# Patient Record
Sex: FEMALE | Race: WHITE | Employment: OTHER | URBAN - METROPOLITAN AREA
[De-identification: names, ages, dates, MRNs, and addresses within clinical notes are randomized per-mention and may not be internally consistent; named-entity substitution may affect disease eponyms.]

---

## 2017-02-07 ENCOUNTER — OFFICE VISIT (OUTPATIENT)
Dept: ORTHOPEDIC SURGERY | Age: 67
End: 2017-02-07

## 2017-02-07 VITALS — BODY MASS INDEX: 29.98 KG/M2 | HEIGHT: 62 IN | WEIGHT: 162.92 LBS

## 2017-02-07 DIAGNOSIS — M79.671 FOOT PAIN, RIGHT: Primary | ICD-10-CM

## 2017-02-07 DIAGNOSIS — M76.62 ACHILLES TENDINITIS, LEFT LEG: ICD-10-CM

## 2017-02-07 DIAGNOSIS — M79.672 PAIN OF LEFT HEEL: ICD-10-CM

## 2017-02-07 DIAGNOSIS — M20.11 HALLUX VALGUS, RIGHT: ICD-10-CM

## 2017-02-07 PROBLEM — M20.10 HALLUX VALGUS: Status: ACTIVE | Noted: 2017-02-07

## 2017-02-07 PROCEDURE — 3014F SCREEN MAMMO DOC REV: CPT | Performed by: PODIATRIST

## 2017-02-07 PROCEDURE — 3017F COLORECTAL CA SCREEN DOC REV: CPT | Performed by: PODIATRIST

## 2017-02-07 PROCEDURE — G8400 PT W/DXA NO RESULTS DOC: HCPCS | Performed by: PODIATRIST

## 2017-02-07 PROCEDURE — 99213 OFFICE O/P EST LOW 20 MIN: CPT | Performed by: PODIATRIST

## 2017-02-07 PROCEDURE — 1036F TOBACCO NON-USER: CPT | Performed by: PODIATRIST

## 2017-02-07 PROCEDURE — G8420 CALC BMI NORM PARAMETERS: HCPCS | Performed by: PODIATRIST

## 2017-02-07 PROCEDURE — G8484 FLU IMMUNIZE NO ADMIN: HCPCS | Performed by: PODIATRIST

## 2017-02-07 PROCEDURE — G8427 DOCREV CUR MEDS BY ELIG CLIN: HCPCS | Performed by: PODIATRIST

## 2017-02-07 PROCEDURE — 1090F PRES/ABSN URINE INCON ASSESS: CPT | Performed by: PODIATRIST

## 2017-02-07 PROCEDURE — 73650 X-RAY EXAM OF HEEL: CPT | Performed by: PODIATRIST

## 2017-02-07 PROCEDURE — 4040F PNEUMOC VAC/ADMIN/RCVD: CPT | Performed by: PODIATRIST

## 2017-02-07 PROCEDURE — 1123F ACP DISCUSS/DSCN MKR DOCD: CPT | Performed by: PODIATRIST

## 2017-02-07 PROCEDURE — 73630 X-RAY EXAM OF FOOT: CPT | Performed by: PODIATRIST

## 2017-02-07 RX ORDER — PREDNISONE 10 MG/1
TABLET ORAL
Qty: 26 TABLET | Refills: 0 | Status: SHIPPED | OUTPATIENT
Start: 2017-02-07 | End: 2017-08-23 | Stop reason: ALTCHOICE

## 2017-02-28 ENCOUNTER — OFFICE VISIT (OUTPATIENT)
Dept: ORTHOPEDIC SURGERY | Age: 67
End: 2017-02-28

## 2017-02-28 VITALS
SYSTOLIC BLOOD PRESSURE: 130 MMHG | HEART RATE: 74 BPM | HEIGHT: 62 IN | WEIGHT: 162.92 LBS | DIASTOLIC BLOOD PRESSURE: 80 MMHG | BODY MASS INDEX: 29.98 KG/M2

## 2017-02-28 DIAGNOSIS — M76.62 ACHILLES TENDINITIS OF LEFT LOWER EXTREMITY: Primary | ICD-10-CM

## 2017-02-28 PROCEDURE — 3017F COLORECTAL CA SCREEN DOC REV: CPT | Performed by: PODIATRIST

## 2017-02-28 PROCEDURE — 3014F SCREEN MAMMO DOC REV: CPT | Performed by: PODIATRIST

## 2017-02-28 PROCEDURE — 1036F TOBACCO NON-USER: CPT | Performed by: PODIATRIST

## 2017-02-28 PROCEDURE — 99213 OFFICE O/P EST LOW 20 MIN: CPT | Performed by: PODIATRIST

## 2017-02-28 PROCEDURE — G8420 CALC BMI NORM PARAMETERS: HCPCS | Performed by: PODIATRIST

## 2017-02-28 PROCEDURE — 1123F ACP DISCUSS/DSCN MKR DOCD: CPT | Performed by: PODIATRIST

## 2017-02-28 PROCEDURE — G8484 FLU IMMUNIZE NO ADMIN: HCPCS | Performed by: PODIATRIST

## 2017-02-28 PROCEDURE — G8400 PT W/DXA NO RESULTS DOC: HCPCS | Performed by: PODIATRIST

## 2017-02-28 PROCEDURE — 1090F PRES/ABSN URINE INCON ASSESS: CPT | Performed by: PODIATRIST

## 2017-02-28 PROCEDURE — 4040F PNEUMOC VAC/ADMIN/RCVD: CPT | Performed by: PODIATRIST

## 2017-02-28 PROCEDURE — G8427 DOCREV CUR MEDS BY ELIG CLIN: HCPCS | Performed by: PODIATRIST

## 2017-02-28 RX ORDER — DEXAMETHASONE SODIUM PHOSPHATE 4 MG/ML
INJECTION, SOLUTION INTRA-ARTICULAR; INTRALESIONAL; INTRAMUSCULAR; INTRAVENOUS; SOFT TISSUE
Qty: 30 ML | Refills: 0 | Status: SHIPPED | OUTPATIENT
Start: 2017-02-28 | End: 2018-01-15

## 2017-03-07 ENCOUNTER — HOSPITAL ENCOUNTER (OUTPATIENT)
Dept: PHYSICAL THERAPY | Age: 67
Discharge: HOME | End: 2017-02-28
Admitting: PODIATRIST

## 2017-03-07 ENCOUNTER — HOSPITAL ENCOUNTER (OUTPATIENT)
Dept: PHYSICAL THERAPY | Age: 67
Discharge: HOME | End: 2017-03-07
Admitting: PODIATRIST

## 2017-03-09 ENCOUNTER — HOSPITAL ENCOUNTER (OUTPATIENT)
Dept: PHYSICAL THERAPY | Age: 67
Discharge: HOME | End: 2017-03-09
Admitting: PODIATRIST

## 2017-03-14 ENCOUNTER — HOSPITAL ENCOUNTER (OUTPATIENT)
Dept: PHYSICAL THERAPY | Age: 67
Discharge: HOME | End: 2017-03-14
Admitting: PODIATRIST

## 2017-03-16 ENCOUNTER — HOSPITAL ENCOUNTER (OUTPATIENT)
Dept: PHYSICAL THERAPY | Age: 67
Discharge: HOME | End: 2017-03-16
Admitting: PODIATRIST

## 2017-03-21 ENCOUNTER — HOSPITAL ENCOUNTER (OUTPATIENT)
Dept: PHYSICAL THERAPY | Age: 67
Discharge: HOME | End: 2017-03-21
Admitting: PODIATRIST

## 2017-03-23 ENCOUNTER — HOSPITAL ENCOUNTER (OUTPATIENT)
Dept: PHYSICAL THERAPY | Age: 67
Discharge: HOME | End: 2017-03-23
Admitting: PODIATRIST

## 2017-03-28 ENCOUNTER — HOSPITAL ENCOUNTER (OUTPATIENT)
Dept: PHYSICAL THERAPY | Age: 67
Discharge: HOME | End: 2017-03-28
Admitting: PODIATRIST

## 2017-03-30 ENCOUNTER — HOSPITAL ENCOUNTER (OUTPATIENT)
Dept: PHYSICAL THERAPY | Age: 67
Discharge: HOME | End: 2017-03-30
Admitting: PODIATRIST

## 2017-04-04 ENCOUNTER — HOSPITAL ENCOUNTER (OUTPATIENT)
Dept: PHYSICAL THERAPY | Age: 67
Discharge: HOME | End: 2017-04-04
Admitting: PODIATRIST

## 2017-04-06 ENCOUNTER — HOSPITAL ENCOUNTER (OUTPATIENT)
Dept: PHYSICAL THERAPY | Age: 67
Discharge: HOME | End: 2017-04-06
Admitting: PODIATRIST

## 2017-04-18 ENCOUNTER — HOSPITAL ENCOUNTER (OUTPATIENT)
Dept: PHYSICAL THERAPY | Age: 67
Discharge: HOME | End: 2017-04-18
Admitting: PODIATRIST

## 2017-04-25 ENCOUNTER — HOSPITAL ENCOUNTER (OUTPATIENT)
Dept: PHYSICAL THERAPY | Age: 67
Discharge: HOME | End: 2017-04-25
Admitting: PODIATRIST

## 2017-05-01 ENCOUNTER — OFFICE VISIT (OUTPATIENT)
Dept: ORTHOPEDIC SURGERY | Age: 67
End: 2017-05-01

## 2017-05-01 VITALS — HEIGHT: 62 IN | BODY MASS INDEX: 29.98 KG/M2 | WEIGHT: 162.92 LBS

## 2017-05-01 DIAGNOSIS — M65.9 SYNOVITIS: ICD-10-CM

## 2017-05-01 DIAGNOSIS — M19.049 HAND ARTHRITIS: ICD-10-CM

## 2017-05-01 DIAGNOSIS — M79.642 HAND PAIN, LEFT: Primary | ICD-10-CM

## 2017-05-01 PROCEDURE — G8420 CALC BMI NORM PARAMETERS: HCPCS | Performed by: ORTHOPAEDIC SURGERY

## 2017-05-01 PROCEDURE — 3014F SCREEN MAMMO DOC REV: CPT | Performed by: ORTHOPAEDIC SURGERY

## 2017-05-01 PROCEDURE — 1123F ACP DISCUSS/DSCN MKR DOCD: CPT | Performed by: ORTHOPAEDIC SURGERY

## 2017-05-01 PROCEDURE — 1036F TOBACCO NON-USER: CPT | Performed by: ORTHOPAEDIC SURGERY

## 2017-05-01 PROCEDURE — 73130 X-RAY EXAM OF HAND: CPT | Performed by: ORTHOPAEDIC SURGERY

## 2017-05-01 PROCEDURE — 3017F COLORECTAL CA SCREEN DOC REV: CPT | Performed by: ORTHOPAEDIC SURGERY

## 2017-05-01 PROCEDURE — G8400 PT W/DXA NO RESULTS DOC: HCPCS | Performed by: ORTHOPAEDIC SURGERY

## 2017-05-01 PROCEDURE — 99214 OFFICE O/P EST MOD 30 MIN: CPT | Performed by: ORTHOPAEDIC SURGERY

## 2017-05-01 PROCEDURE — 1090F PRES/ABSN URINE INCON ASSESS: CPT | Performed by: ORTHOPAEDIC SURGERY

## 2017-05-01 PROCEDURE — G8427 DOCREV CUR MEDS BY ELIG CLIN: HCPCS | Performed by: ORTHOPAEDIC SURGERY

## 2017-05-01 PROCEDURE — 4040F PNEUMOC VAC/ADMIN/RCVD: CPT | Performed by: ORTHOPAEDIC SURGERY

## 2017-05-01 RX ORDER — DICLOFENAC SODIUM 75 MG/1
TABLET, DELAYED RELEASE ORAL
COMMUNITY
Start: 2017-04-17 | End: 2017-10-31 | Stop reason: ALTCHOICE

## 2017-05-01 RX ORDER — PREDNISONE 10 MG/1
TABLET ORAL
Qty: 26 TABLET | Refills: 0 | Status: SHIPPED | OUTPATIENT
Start: 2017-05-01 | End: 2017-08-23 | Stop reason: ALTCHOICE

## 2017-07-26 ENCOUNTER — OFFICE VISIT (OUTPATIENT)
Dept: ORTHOPEDIC SURGERY | Age: 67
End: 2017-07-26

## 2017-07-26 ENCOUNTER — HOSPITAL ENCOUNTER (OUTPATIENT)
Dept: OCCUPATIONAL THERAPY | Age: 67
Discharge: HOME | End: 2017-07-31
Admitting: ORTHOPAEDIC SURGERY

## 2017-07-26 VITALS — HEIGHT: 62 IN | WEIGHT: 162 LBS | BODY MASS INDEX: 29.81 KG/M2

## 2017-07-26 DIAGNOSIS — M79.641 HAND PAIN, RIGHT: Primary | ICD-10-CM

## 2017-07-26 PROCEDURE — G8428 CUR MEDS NOT DOCUMENT: HCPCS | Performed by: ORTHOPAEDIC SURGERY

## 2017-07-26 PROCEDURE — 3014F SCREEN MAMMO DOC REV: CPT | Performed by: ORTHOPAEDIC SURGERY

## 2017-07-26 PROCEDURE — G8400 PT W/DXA NO RESULTS DOC: HCPCS | Performed by: ORTHOPAEDIC SURGERY

## 2017-07-26 PROCEDURE — 99214 OFFICE O/P EST MOD 30 MIN: CPT | Performed by: ORTHOPAEDIC SURGERY

## 2017-07-26 PROCEDURE — 73130 X-RAY EXAM OF HAND: CPT | Performed by: ORTHOPAEDIC SURGERY

## 2017-07-26 PROCEDURE — G8419 CALC BMI OUT NRM PARAM NOF/U: HCPCS | Performed by: ORTHOPAEDIC SURGERY

## 2017-07-26 PROCEDURE — 1123F ACP DISCUSS/DSCN MKR DOCD: CPT | Performed by: ORTHOPAEDIC SURGERY

## 2017-07-26 PROCEDURE — 1090F PRES/ABSN URINE INCON ASSESS: CPT | Performed by: ORTHOPAEDIC SURGERY

## 2017-07-26 PROCEDURE — 1036F TOBACCO NON-USER: CPT | Performed by: ORTHOPAEDIC SURGERY

## 2017-07-26 PROCEDURE — 4040F PNEUMOC VAC/ADMIN/RCVD: CPT | Performed by: ORTHOPAEDIC SURGERY

## 2017-07-26 PROCEDURE — 3017F COLORECTAL CA SCREEN DOC REV: CPT | Performed by: ORTHOPAEDIC SURGERY

## 2017-08-23 ENCOUNTER — OFFICE VISIT (OUTPATIENT)
Dept: ORTHOPEDIC SURGERY | Age: 67
End: 2017-08-23

## 2017-08-23 VITALS — BODY MASS INDEX: 30.36 KG/M2 | HEIGHT: 62 IN | WEIGHT: 165 LBS

## 2017-08-23 DIAGNOSIS — S63.636D SPRAIN OF INTERPHALANGEAL JOINT OF RIGHT LITTLE FINGER, SUBSEQUENT ENCOUNTER: Primary | ICD-10-CM

## 2017-08-23 PROCEDURE — 3014F SCREEN MAMMO DOC REV: CPT | Performed by: ORTHOPAEDIC SURGERY

## 2017-08-23 PROCEDURE — 99213 OFFICE O/P EST LOW 20 MIN: CPT | Performed by: ORTHOPAEDIC SURGERY

## 2017-08-23 PROCEDURE — 4040F PNEUMOC VAC/ADMIN/RCVD: CPT | Performed by: ORTHOPAEDIC SURGERY

## 2017-08-23 PROCEDURE — G8417 CALC BMI ABV UP PARAM F/U: HCPCS | Performed by: ORTHOPAEDIC SURGERY

## 2017-08-23 PROCEDURE — 1090F PRES/ABSN URINE INCON ASSESS: CPT | Performed by: ORTHOPAEDIC SURGERY

## 2017-08-23 PROCEDURE — G8427 DOCREV CUR MEDS BY ELIG CLIN: HCPCS | Performed by: ORTHOPAEDIC SURGERY

## 2017-08-23 PROCEDURE — 1123F ACP DISCUSS/DSCN MKR DOCD: CPT | Performed by: ORTHOPAEDIC SURGERY

## 2017-08-23 PROCEDURE — 1036F TOBACCO NON-USER: CPT | Performed by: ORTHOPAEDIC SURGERY

## 2017-08-23 PROCEDURE — G8400 PT W/DXA NO RESULTS DOC: HCPCS | Performed by: ORTHOPAEDIC SURGERY

## 2017-08-23 PROCEDURE — 3017F COLORECTAL CA SCREEN DOC REV: CPT | Performed by: ORTHOPAEDIC SURGERY

## 2017-10-31 ENCOUNTER — OFFICE VISIT (OUTPATIENT)
Dept: ORTHOPEDIC SURGERY | Age: 67
End: 2017-10-31

## 2017-10-31 VITALS
DIASTOLIC BLOOD PRESSURE: 75 MMHG | WEIGHT: 164.9 LBS | SYSTOLIC BLOOD PRESSURE: 131 MMHG | HEIGHT: 62 IN | HEART RATE: 78 BPM | BODY MASS INDEX: 30.35 KG/M2

## 2017-10-31 DIAGNOSIS — M76.61 RIGHT ACHILLES TENDINITIS: ICD-10-CM

## 2017-10-31 DIAGNOSIS — M79.671 PAIN OF RIGHT HEEL: Primary | ICD-10-CM

## 2017-10-31 PROCEDURE — G8427 DOCREV CUR MEDS BY ELIG CLIN: HCPCS | Performed by: PODIATRIST

## 2017-10-31 PROCEDURE — 3014F SCREEN MAMMO DOC REV: CPT | Performed by: PODIATRIST

## 2017-10-31 PROCEDURE — 1090F PRES/ABSN URINE INCON ASSESS: CPT | Performed by: PODIATRIST

## 2017-10-31 PROCEDURE — 3017F COLORECTAL CA SCREEN DOC REV: CPT | Performed by: PODIATRIST

## 2017-10-31 PROCEDURE — L4361 PNEUMA/VAC WALK BOOT PRE OTS: HCPCS | Performed by: PODIATRIST

## 2017-10-31 PROCEDURE — 1123F ACP DISCUSS/DSCN MKR DOCD: CPT | Performed by: PODIATRIST

## 2017-10-31 PROCEDURE — 4040F PNEUMOC VAC/ADMIN/RCVD: CPT | Performed by: PODIATRIST

## 2017-10-31 PROCEDURE — 1036F TOBACCO NON-USER: CPT | Performed by: PODIATRIST

## 2017-10-31 PROCEDURE — G8417 CALC BMI ABV UP PARAM F/U: HCPCS | Performed by: PODIATRIST

## 2017-10-31 PROCEDURE — G8484 FLU IMMUNIZE NO ADMIN: HCPCS | Performed by: PODIATRIST

## 2017-10-31 PROCEDURE — 99213 OFFICE O/P EST LOW 20 MIN: CPT | Performed by: PODIATRIST

## 2017-10-31 PROCEDURE — G8400 PT W/DXA NO RESULTS DOC: HCPCS | Performed by: PODIATRIST

## 2017-10-31 RX ORDER — LOSARTAN POTASSIUM 100 MG/1
100 TABLET ORAL DAILY
COMMUNITY
Start: 2017-09-25

## 2017-10-31 RX ORDER — PREDNISONE 10 MG/1
TABLET ORAL
Qty: 26 TABLET | Refills: 0 | Status: SHIPPED | OUTPATIENT
Start: 2017-10-31 | End: 2018-01-15

## 2017-10-31 RX ORDER — DOCUSATE SODIUM 100 MG/1
100 CAPSULE, LIQUID FILLED ORAL
COMMUNITY
End: 2018-01-15

## 2017-10-31 RX ORDER — DICLOFENAC SODIUM 75 MG/1
TABLET, DELAYED RELEASE ORAL
COMMUNITY
Start: 2017-10-09 | End: 2017-10-31 | Stop reason: ALTCHOICE

## 2017-10-31 RX ORDER — ATORVASTATIN CALCIUM 10 MG/1
10 TABLET, FILM COATED ORAL DAILY
COMMUNITY
Start: 2017-06-06

## 2017-10-31 RX ORDER — HYDRALAZINE HYDROCHLORIDE 10 MG/1
10 TABLET, FILM COATED ORAL DAILY
COMMUNITY
Start: 2017-10-27

## 2017-10-31 RX ORDER — METOPROLOL SUCCINATE 50 MG/1
50 TABLET, EXTENDED RELEASE ORAL NIGHTLY
COMMUNITY
Start: 2017-08-21

## 2017-10-31 RX ORDER — TRIAMCINOLONE ACETONIDE 1 MG/G
CREAM TOPICAL
COMMUNITY
End: 2018-01-15

## 2017-10-31 RX ORDER — MULTIVIT WITH MINERALS/LUTEIN
1000 TABLET ORAL
COMMUNITY
End: 2018-01-15

## 2017-10-31 RX ORDER — HYDRALAZINE HYDROCHLORIDE 10 MG/1
TABLET, FILM COATED ORAL
COMMUNITY
Start: 2017-10-16 | End: 2018-01-15

## 2017-10-31 NOTE — PROGRESS NOTES
should be decreased. The importance of rest in this condition was emphasized. I prescribed a prednisone 10 mg taper. The chronic and recurrent nature of the painful episodes with this condition was discussed. Both short-term and long-term treatments were discussed. At this point, I only recommend a trial of conservative treatment and the patient agrees with the treatment plan. I will see them back in approximately 3 weeks. Procedures    Airselect Tall Pneumatic Walking Boot     Patient was prescribed a Kirke Stammer Tall Walking Boot. The right foot will require stabilization / immobilization from this semi-rigid / rigid orthosis to improve their function. The orthosis will assist in protecting the affected area, provide functional support and facilitate healing. The patient was educated and fit by a healthcare professional with expert knowledge and specialization in brace application while under the direct supervision of the physician. Verbal and written instructions for the use of and application of this item were provided. They were instructed to contact the office immediately should the brace result in increased pain, decreased sensation, increased swelling or worsening of the condition.

## 2017-11-21 ENCOUNTER — OFFICE VISIT (OUTPATIENT)
Dept: ORTHOPEDIC SURGERY | Age: 67
End: 2017-11-21

## 2017-11-21 VITALS
SYSTOLIC BLOOD PRESSURE: 131 MMHG | HEART RATE: 74 BPM | WEIGHT: 164.9 LBS | DIASTOLIC BLOOD PRESSURE: 66 MMHG | BODY MASS INDEX: 30.35 KG/M2 | HEIGHT: 62 IN

## 2017-11-21 DIAGNOSIS — M76.61 ACHILLES TENDINITIS, RIGHT LEG: Primary | ICD-10-CM

## 2017-11-21 PROCEDURE — 99213 OFFICE O/P EST LOW 20 MIN: CPT | Performed by: PODIATRIST

## 2017-11-21 PROCEDURE — 3017F COLORECTAL CA SCREEN DOC REV: CPT | Performed by: PODIATRIST

## 2017-11-21 PROCEDURE — G8417 CALC BMI ABV UP PARAM F/U: HCPCS | Performed by: PODIATRIST

## 2017-11-21 PROCEDURE — 1090F PRES/ABSN URINE INCON ASSESS: CPT | Performed by: PODIATRIST

## 2017-11-21 PROCEDURE — G8427 DOCREV CUR MEDS BY ELIG CLIN: HCPCS | Performed by: PODIATRIST

## 2017-11-21 PROCEDURE — G8400 PT W/DXA NO RESULTS DOC: HCPCS | Performed by: PODIATRIST

## 2017-11-21 PROCEDURE — 4040F PNEUMOC VAC/ADMIN/RCVD: CPT | Performed by: PODIATRIST

## 2017-11-21 PROCEDURE — 1123F ACP DISCUSS/DSCN MKR DOCD: CPT | Performed by: PODIATRIST

## 2017-11-21 PROCEDURE — 3014F SCREEN MAMMO DOC REV: CPT | Performed by: PODIATRIST

## 2017-11-21 PROCEDURE — 1036F TOBACCO NON-USER: CPT | Performed by: PODIATRIST

## 2017-11-21 PROCEDURE — G8484 FLU IMMUNIZE NO ADMIN: HCPCS | Performed by: PODIATRIST

## 2017-11-21 RX ORDER — OMEPRAZOLE 20 MG/1
20 CAPSULE, DELAYED RELEASE ORAL
COMMUNITY
Start: 2017-11-03 | End: 2018-01-15

## 2017-11-21 RX ORDER — DICLOFENAC SODIUM 75 MG/1
TABLET, DELAYED RELEASE ORAL
COMMUNITY
Start: 2017-11-07 | End: 2018-01-15

## 2017-12-06 ENCOUNTER — OFFICE VISIT (OUTPATIENT)
Dept: ORTHOPEDIC SURGERY | Age: 67
End: 2017-12-06

## 2017-12-06 VITALS
DIASTOLIC BLOOD PRESSURE: 60 MMHG | BODY MASS INDEX: 30.35 KG/M2 | WEIGHT: 164.9 LBS | HEIGHT: 62 IN | SYSTOLIC BLOOD PRESSURE: 131 MMHG | HEART RATE: 68 BPM

## 2017-12-06 DIAGNOSIS — M76.62 ACHILLES TENDINITIS, LEFT LEG: Primary | ICD-10-CM

## 2017-12-06 PROCEDURE — G8484 FLU IMMUNIZE NO ADMIN: HCPCS | Performed by: PODIATRIST

## 2017-12-06 PROCEDURE — 1123F ACP DISCUSS/DSCN MKR DOCD: CPT | Performed by: PODIATRIST

## 2017-12-06 PROCEDURE — 4040F PNEUMOC VAC/ADMIN/RCVD: CPT | Performed by: PODIATRIST

## 2017-12-06 PROCEDURE — 1036F TOBACCO NON-USER: CPT | Performed by: PODIATRIST

## 2017-12-06 PROCEDURE — G8417 CALC BMI ABV UP PARAM F/U: HCPCS | Performed by: PODIATRIST

## 2017-12-06 PROCEDURE — G8427 DOCREV CUR MEDS BY ELIG CLIN: HCPCS | Performed by: PODIATRIST

## 2017-12-06 PROCEDURE — 99213 OFFICE O/P EST LOW 20 MIN: CPT | Performed by: PODIATRIST

## 2017-12-06 PROCEDURE — 1090F PRES/ABSN URINE INCON ASSESS: CPT | Performed by: PODIATRIST

## 2017-12-06 PROCEDURE — 3014F SCREEN MAMMO DOC REV: CPT | Performed by: PODIATRIST

## 2017-12-06 PROCEDURE — 3017F COLORECTAL CA SCREEN DOC REV: CPT | Performed by: PODIATRIST

## 2017-12-06 PROCEDURE — G8400 PT W/DXA NO RESULTS DOC: HCPCS | Performed by: PODIATRIST

## 2017-12-06 RX ORDER — CITALOPRAM 20 MG/1
20 TABLET ORAL DAILY
COMMUNITY
Start: 2017-11-21

## 2017-12-06 NOTE — PROGRESS NOTES
HISTORY OF PRESENT ILLNESS:  This is a return visit for a patient with a chief complaint of pain to the back of the right heel. The pain is no better. PHYSICAL EXAM:  The majority of the palpable tenderness is at the posterior medial  aspect of the right posterior heel at the insertion of the Achilles tendon. There is a small prominence at the posterior aspect of the right heel with some mild erythema. There is minimal pain of the Achilles tendon itself. There is no palpable deficit and Hirsch's test is negative for a complete rupture. There is full-strength with plantar flexion at the ankle. This is symmetrical.    Pedal pulses are palpable, bilateral.  The sensation is grossly intact, bilateral.    Review of the right ankle MRI demonstrates a small oblique tear of the Achilles tendon at the insertion point at the medial aspect. ASSESSMENT:  Insertional Achilles Tendinitis/Retrocalcaneal Bursitis, right      PLAN: I reviewed the MRI with patient. I recommended repair the Achilles tendon with retrocalcaneal exostectomy. Different surgical options were presented. The potential complications of any surgery were discussed. These included but were not limited to infection, pain, swelling, bleeding, numbness, recurrence of the problem, and need for further surgery to address complications. The patient verbally acknowledged consideration of these potential complications. I feel that have answered all of the preop questions regarding this problem. Certainly if they have any further questions, they can call the office. In the meantime advised her to get back into the boot to control pain.

## 2018-01-09 ENCOUNTER — TELEPHONE (OUTPATIENT)
Dept: ORTHOPEDIC SURGERY | Age: 68
End: 2018-01-09

## 2018-01-18 NOTE — ANESTHESIA PRE-OP
no vitals filed for this visit. BP Readings from Last 3 Encounters:   12/06/17 131/60   11/21/17 131/66   10/31/17 131/75       NPO Status:                                                                                 BMI:   Wt Readings from Last 3 Encounters:   01/15/18 160 lb (72.6 kg)   12/06/17 164 lb 14.5 oz (74.8 kg)   11/21/17 164 lb 14.5 oz (74.8 kg)     There is no height or weight on file to calculate BMI. Anesthesia Evaluation  Patient summary reviewed and Nursing notes reviewed   history of anesthetic complications: PONV. Airway: Mallampati: II  TM distance: >3 FB   Neck ROM: full  Mouth opening: > = 3 FB Dental:          Pulmonary:                              Cardiovascular:    (+) hypertension:, hyperlipidemia                  Neuro/Psych:   (+) headaches:, psychiatric history:            GI/Hepatic/Renal:   (+) renal disease: kidney stones,           Endo/Other:    (+) : arthritis:., .                 Abdominal:           Vascular:                                        Anesthesia Plan      general     ASA 3    (60-year-old female presents for right Achilles' tendon lengthening and repair. Plan general anesthesia with ASA standard monitors; popliteal fossa sciatic nerve block for postoperative analgesia upon request of the attending surgeon. Questions answered. Patient agreeable with anesthetic plan.  )  Induction: intravenous. Plan discussed with CRNA.     Attending anesthesiologist reviewed and agrees with Pre Eval content        Pamela Galvan MD   1/18/2018

## 2018-01-19 ENCOUNTER — HOSPITAL ENCOUNTER (OUTPATIENT)
Dept: SURGERY | Age: 68
Discharge: HOME | End: 2018-01-19
Attending: PODIATRIST | Admitting: PODIATRIST

## 2018-01-19 VITALS
OXYGEN SATURATION: 96 % | SYSTOLIC BLOOD PRESSURE: 138 MMHG | DIASTOLIC BLOOD PRESSURE: 70 MMHG | TEMPERATURE: 97.1 F | HEART RATE: 83 BPM | RESPIRATION RATE: 16 BRPM

## 2018-01-19 DIAGNOSIS — S86.011S PARTIAL TEAR OF RIGHT ACHILLES TENDON, SEQUELA: Primary | ICD-10-CM

## 2018-01-19 DIAGNOSIS — M76.61 ACHILLES TENDINITIS OF RIGHT LOWER EXTREMITY: ICD-10-CM

## 2018-01-19 RX ORDER — LIDOCAINE HYDROCHLORIDE 10 MG/ML
1 INJECTION, SOLUTION EPIDURAL; INFILTRATION; INTRACAUDAL; PERINEURAL
Status: ACTIVE | OUTPATIENT
Start: 2018-01-19 | End: 2018-01-19

## 2018-01-19 RX ORDER — DEXTROSE MONOHYDRATE 50 MG/ML
INJECTION, SOLUTION INTRAVENOUS
Status: DISPENSED
Start: 2018-01-19 | End: 2018-01-19

## 2018-01-19 RX ORDER — MIDAZOLAM HYDROCHLORIDE 1 MG/ML
INJECTION INTRAMUSCULAR; INTRAVENOUS
Status: DISPENSED
Start: 2018-01-19 | End: 2018-01-19

## 2018-01-19 RX ORDER — DIPHENHYDRAMINE HYDROCHLORIDE 50 MG/ML
12.5 INJECTION INTRAMUSCULAR; INTRAVENOUS
Status: ACTIVE | OUTPATIENT
Start: 2018-01-19 | End: 2018-01-19

## 2018-01-19 RX ORDER — LABETALOL HYDROCHLORIDE 5 MG/ML
5 INJECTION, SOLUTION INTRAVENOUS EVERY 10 MIN PRN
Status: DISCONTINUED | OUTPATIENT
Start: 2018-01-19 | End: 2018-01-20 | Stop reason: HOSPADM

## 2018-01-19 RX ORDER — SODIUM CHLORIDE 0.9 % (FLUSH) 0.9 %
10 SYRINGE (ML) INJECTION EVERY 12 HOURS SCHEDULED
Status: DISCONTINUED | OUTPATIENT
Start: 2018-01-19 | End: 2018-01-20 | Stop reason: HOSPADM

## 2018-01-19 RX ORDER — MORPHINE SULFATE 2 MG/ML
1 INJECTION, SOLUTION INTRAMUSCULAR; INTRAVENOUS EVERY 5 MIN PRN
Status: DISCONTINUED | OUTPATIENT
Start: 2018-01-19 | End: 2018-01-20 | Stop reason: HOSPADM

## 2018-01-19 RX ORDER — SODIUM CHLORIDE 0.9 % (FLUSH) 0.9 %
10 SYRINGE (ML) INJECTION PRN
Status: DISCONTINUED | OUTPATIENT
Start: 2018-01-19 | End: 2018-01-20 | Stop reason: HOSPADM

## 2018-01-19 RX ORDER — OXYCODONE HYDROCHLORIDE 5 MG/1
10 TABLET ORAL PRN
Status: COMPLETED | OUTPATIENT
Start: 2018-01-19 | End: 2018-01-19

## 2018-01-19 RX ORDER — METOCLOPRAMIDE HYDROCHLORIDE 5 MG/ML
10 INJECTION INTRAMUSCULAR; INTRAVENOUS
Status: ACTIVE | OUTPATIENT
Start: 2018-01-19 | End: 2018-01-19

## 2018-01-19 RX ORDER — PROMETHAZINE HYDROCHLORIDE 25 MG/1
25 TABLET ORAL EVERY 6 HOURS PRN
Qty: 30 TABLET | Refills: 0 | Status: SHIPPED | OUTPATIENT
Start: 2018-01-19 | End: 2018-01-26

## 2018-01-19 RX ORDER — SODIUM CHLORIDE, SODIUM LACTATE, POTASSIUM CHLORIDE, CALCIUM CHLORIDE 600; 310; 30; 20 MG/100ML; MG/100ML; MG/100ML; MG/100ML
INJECTION, SOLUTION INTRAVENOUS CONTINUOUS
Status: DISCONTINUED | OUTPATIENT
Start: 2018-01-19 | End: 2018-01-20 | Stop reason: HOSPADM

## 2018-01-19 RX ORDER — PROMETHAZINE HYDROCHLORIDE 25 MG/ML
6.25 INJECTION, SOLUTION INTRAMUSCULAR; INTRAVENOUS
Status: ACTIVE | OUTPATIENT
Start: 2018-01-19 | End: 2018-01-19

## 2018-01-19 RX ORDER — OXYCODONE HYDROCHLORIDE AND ACETAMINOPHEN 5; 325 MG/1; MG/1
1 TABLET ORAL EVERY 6 HOURS PRN
Qty: 20 TABLET | Refills: 0 | Status: SHIPPED | OUTPATIENT
Start: 2018-01-19 | End: 2018-01-23

## 2018-01-19 RX ORDER — FENTANYL CITRATE 50 UG/ML
INJECTION, SOLUTION INTRAMUSCULAR; INTRAVENOUS
Status: DISPENSED
Start: 2018-01-19 | End: 2018-01-19

## 2018-01-19 RX ORDER — MEPERIDINE HYDROCHLORIDE 50 MG/ML
12.5 INJECTION INTRAMUSCULAR; INTRAVENOUS; SUBCUTANEOUS EVERY 5 MIN PRN
Status: DISCONTINUED | OUTPATIENT
Start: 2018-01-19 | End: 2018-01-20 | Stop reason: HOSPADM

## 2018-01-19 RX ORDER — OXYCODONE HYDROCHLORIDE 5 MG/1
5 TABLET ORAL PRN
Status: COMPLETED | OUTPATIENT
Start: 2018-01-19 | End: 2018-01-19

## 2018-01-19 RX ORDER — HYDRALAZINE HYDROCHLORIDE 20 MG/ML
5 INJECTION INTRAMUSCULAR; INTRAVENOUS EVERY 10 MIN PRN
Status: DISCONTINUED | OUTPATIENT
Start: 2018-01-19 | End: 2018-01-20 | Stop reason: HOSPADM

## 2018-01-19 RX ADMIN — OXYCODONE HYDROCHLORIDE 5 MG: 5 TABLET ORAL at 09:26

## 2018-01-19 RX ADMIN — SODIUM CHLORIDE, SODIUM LACTATE, POTASSIUM CHLORIDE, CALCIUM CHLORIDE: 600; 310; 30; 20 INJECTION, SOLUTION INTRAVENOUS at 06:44

## 2018-01-19 ASSESSMENT — PAIN SCALES - GENERAL
PAINLEVEL_OUTOF10: 0
PAINLEVEL_OUTOF10: 6

## 2018-01-19 NOTE — OP NOTE
inflated for hemostasis. Attention was then directed to  the posterior aspect of the heel. No signs of infection were noted. She  demonstrated large prominence with mild erythema overlying the prominence. A linear incision was created just medial to the midline of the heel and  extended down to the level of the peritenon. All neurovascular structures  were carefully identified and either bovied or retracted. The medial and  lateral aspects of the tendon at the insertion were incised and deepened  down to the level of the bone. These incisions were carried distally to a  point distal to the posterior spur and then the Achilles tendon was  transected at that level. The tendon was then carefully reflected off the  posterior surface of heel to reveal the posterior heel spur. A sagittal  saw was used to resect the spur. A power rasp was used to smooth the sharp  edges. This was reviewed under mini C-arm and adequate resection of the  deformity was felt to be obtained. The anterior surface of the Achilles  tendon was then inspected and fatty degeneration was noted at the insertion  point. This was debrided using a #15 blade. I felt that debridement was  carried down to healthy tendon tissue. Next, an approximate 2.5 x 2.5 cm  section of GraftJacket was sutured on to the anterior surface of the  Achilles tendon. This was sutured on with 2-0 Vicryl. The tendon was then  reattached to the posterior surface and heel using two soft tissue  anchors. The foot was plantarflexed upon reattaching the Achilles tendon. After this was secured with the suture material, a Hirsch's test was  performed on the right lower leg and plantarflexion was noted of the right  foot. The periosteal tissue was then repaired using 2-0 Vicryl. The skin  was then reapproximated using 3-0 Vicryl and 4-0 Monocryl. Steri-Strips  were applied across the wound. A mildly compressive dressing was applied  to the patient's right ankle. Upon release of the tourniquet, immediate  warmth and perfusion was noted to have returned to all digits of the right  foot shortly after. There were no complications encountered during the  procedure. All materials and injectables are as above. There were no  specimens sent to pathology.         Matthew Lopez DPM    D: 01/19/2018 8:46:24       T: 01/19/2018 12:59:10     [id number]  Job#: [id number]     Doc#: [id number]    CC:

## 2018-01-19 NOTE — PROGRESS NOTES
Discharge in no distress: accompanied pt to passenger side of car with family/significant other driving. Resp WNL. Pt's significant other verbalized understanding of d/c instructions and verbalizes no additional questions.

## 2018-01-19 NOTE — BRIEF OP NOTE
Brief Postoperative Note    [facility]  YOB: 1950  [id number]    Pre-operative Diagnosis:   1. Achilles tendon tear, right      2. Chronic Achilles tendinitis, right      3. Retrocalcaneal exostosis, right    Post-operative Diagnosis: Same    Procedure:   1. Achilles tendon repair, right    2. Retrocalcaneal exostectomy, right    Anesthesia: General with sciatic nerve block    Surgeons/Assistants:  Rosina Rosario DPM    Estimated Blood Loss: less than 50     Complications: None    Specimens: Was Not Obtained    Findings: fatty degeneration of insertion    Electronically signed by Chicho Jennings DPM on 1/19/2018 at 8:35 AM

## 2018-01-19 NOTE — PROGRESS NOTES
Family to bedside. Discharge instructions reviewed with patient/responsible adult and understanding verbalized. Discharge instructions signed and copies given. Patient to be discharged home with belongings. Percocet and phenergan scripts given. Family states they have scooter wheelchair and walker and know how to use them.

## 2018-01-23 ENCOUNTER — OFFICE VISIT (OUTPATIENT)
Dept: ORTHOPEDIC SURGERY | Age: 68
End: 2018-01-23

## 2018-01-23 VITALS
HEART RATE: 87 BPM | WEIGHT: 160.05 LBS | SYSTOLIC BLOOD PRESSURE: 138 MMHG | HEIGHT: 62 IN | BODY MASS INDEX: 29.45 KG/M2 | DIASTOLIC BLOOD PRESSURE: 68 MMHG

## 2018-01-23 DIAGNOSIS — Z09 POSTOP CHECK: Primary | ICD-10-CM

## 2018-01-23 DIAGNOSIS — M77.31 CALCANEAL SPUR OF RIGHT FOOT: ICD-10-CM

## 2018-01-23 DIAGNOSIS — M76.62 ACHILLES TENDINITIS OF LEFT LOWER EXTREMITY: ICD-10-CM

## 2018-01-23 PROCEDURE — 29405 APPL SHORT LEG CAST: CPT | Performed by: PODIATRIST

## 2018-01-23 PROCEDURE — 99024 POSTOP FOLLOW-UP VISIT: CPT | Performed by: PODIATRIST

## 2018-01-23 RX ORDER — CEFUROXIME AXETIL 500 MG/1
TABLET ORAL
Status: ON HOLD | COMMUNITY
Start: 2017-12-28 | End: 2019-05-23 | Stop reason: ALTCHOICE

## 2018-01-23 RX ORDER — OXYCODONE HYDROCHLORIDE AND ACETAMINOPHEN 5; 325 MG/1; MG/1
1-2 TABLET ORAL EVERY 8 HOURS PRN
Qty: 30 TABLET | Refills: 0 | Status: SHIPPED | OUTPATIENT
Start: 2018-01-23 | End: 2018-01-30

## 2018-01-23 NOTE — PROGRESS NOTES
She denies any fever or chills. No signs of infection are present. There is minimal edema. 2 nonweightbearing views of the right heel were taken. These demonstrate resection of the posterior spur and good placement of the soft tissue anchors. Status post right Achilles tendon repair, retrocalcaneal exostectomy ×4 days    The postoperative dressing was changed. A below-the-knee cast was applied to the right lower leg. She'll remain nonweightbearing.   I'll see her back on February 13 and please remove the cast.

## 2018-02-14 ENCOUNTER — OFFICE VISIT (OUTPATIENT)
Dept: ORTHOPEDIC SURGERY | Age: 68
End: 2018-02-14

## 2018-02-14 VITALS — WEIGHT: 162 LBS | BODY MASS INDEX: 29.81 KG/M2 | HEIGHT: 62 IN

## 2018-02-14 DIAGNOSIS — Z09 POSTOP CHECK: Primary | ICD-10-CM

## 2018-02-14 PROCEDURE — 99024 POSTOP FOLLOW-UP VISIT: CPT | Performed by: PODIATRIST

## 2018-02-14 NOTE — PROGRESS NOTES
4 week postop check. She states that she's not having any pain. No new complaints. The incision is well-healed except for a small superficial dehiscence in the central portion of the incision. No signs of infection are present. There is no edema. Status post Achilles tendon repair/retrocalcaneal exostectomy right ×4 weeks    She can begin bearing weight 50% in her boot with the assistance of crutches. Crutches were dispensed. I'll see her back in 1 week. Procedures    Aluminum Crutches     Patient was prescribed Medline Aluminum Crutches. This mobility device is required for the following reasons:    Patient has mobility limitations that significantly impair their ability to participate in one or more mobility related activities of daily living. The patient is able to safely use the mobility device. Functional mobility deficit can be sufficiently resolved with the use of this device. Verbal and written instructions for the use of and application of this item were provided. The patient was educated and fit by a healthcare professional with expert knowledge and specialization in brace application while under the direct supervision of the treating physician. They were instructed to contact the office immediately should the equipment result in increased pain, decreased sensation, increased swelling or worsening of the condition.

## 2018-02-20 ENCOUNTER — OFFICE VISIT (OUTPATIENT)
Dept: ORTHOPEDIC SURGERY | Age: 68
End: 2018-02-20

## 2018-02-20 VITALS
BODY MASS INDEX: 29.82 KG/M2 | DIASTOLIC BLOOD PRESSURE: 63 MMHG | SYSTOLIC BLOOD PRESSURE: 134 MMHG | HEART RATE: 74 BPM | HEIGHT: 62 IN | WEIGHT: 162.04 LBS

## 2018-02-20 DIAGNOSIS — Z09 POSTOP CHECK: Primary | ICD-10-CM

## 2018-02-20 PROCEDURE — 99024 POSTOP FOLLOW-UP VISIT: CPT | Performed by: PODIATRIST

## 2018-02-20 RX ORDER — HYDRALAZINE HYDROCHLORIDE 10 MG/1
10 TABLET, FILM COATED ORAL
Status: ON HOLD | COMMUNITY
Start: 2018-02-13 | End: 2019-05-23

## 2018-02-22 ENCOUNTER — HOSPITAL ENCOUNTER (OUTPATIENT)
Dept: PHYSICAL THERAPY | Age: 68
Discharge: HOME | End: 2018-02-28
Admitting: PODIATRIST

## 2018-02-22 NOTE — PLAN OF CARE
scabbing noted with dry skin    Gait: (include devices/WB status) WBAT in boot, ? WB on RLE    Orthopedic Special Tests: NT                       [x] Patient history, allergies, meds reviewed. Medical chart reviewed. See intake form. Review Of Systems (ROS):  [x]Performed Review of systems (Integumentary, CardioPulmonary, Neurological) by intake and observation. Intake form has been scanned into medical record. Patient has been instructed to contact their primary care physician regarding ROS issues if not already being addressed at this time. Co-morbidities/Complexities (which will affect course of rehabilitation):   [x]None           Arthritic conditions   []Rheumatoid arthritis (M05.9)  []Osteoarthritis (M19.91)   Cardiovascular conditions   []Hypertension (I10)  []Hyperlipidemia (E78.5)  []Angina pectoris (I20)  []Atherosclerosis (I70)   Musculoskeletal conditions   []Disc pathology   []Congenital spine pathologies   []Prior surgical intervention  []Osteoporosis (M81.8)  []Osteopenia (M85.8)   Endocrine conditions   []Hypothyroid (E03.9)  []Hyperthyroid Gastrointestinal conditions   []Constipation (T10.43)   Metabolic conditions   []Morbid obesity (E66.01)  []Diabetes type 1(E10.65) or 2 (E11.65)   []Neuropathy (G60.9)     Pulmonary conditions   []Asthma (J45)  []Coughing   []COPD (J44.9)   Psychological Disorders  []Anxiety (F41.9)  []Depression (F32.9)   []Other:   []Other:          Barriers to/and or personal factors that will affect rehab potential:              []Age  []Sex              []Motivation/Lack of Motivation                        []Co-Morbidities              []Cognitive Function, education/learning barriers              []Environmental, home barriers              []profession/work barriers  []past PT/medical experience  []other:  Justification: none    Falls Risk Assessment (30 days):  [x] Falls Risk assessed and no intervention required.   [] Falls Risk assessed and Patient requires intervention due to being higher risk   TUG score (>12s at risk):     [] Falls education provided, including       G-Codes:  PT G-Codes  Functional Assessment Tool Used: LEFS  Score: 69%  Functional Limitation: Mobility: Walking and moving around  Mobility: Walking and Moving Around Current Status (): At least 60 percent but less than 80 percent impaired, limited or restricted  Mobility: Walking and Moving Around Goal Status (): At least 20 percent but less than 40 percent impaired, limited or restricted    ASSESSMENT:   Functional Impairments:     [x]Noted lumbar/proximal hip/LE joint hypomobility   [x]Decreased LE functional ROM   [x]Decreased core/proximal hip strength and neuromuscular control   [x]Decreased LE functional strength   [x]Reduced balance/proprioceptive control   []other:      Functional Activity Limitations (from functional questionnaire and intake)   [x]Reduced ability to tolerate prolonged functional positions   [x]Reduced ability or difficulty with changes of positions or transfers between positions   [x]Reduced ability to maintain good posture and demonstrate good body mechanics with sitting, bending, and lifting   [x]Reduced ability to sleep   [x] Reduced ability or tolerance with driving and/or computer work   [x]Reduced ability to perform lifting, carrying tasks   [x]Reduced ability to squat   [x]Reduced ability to forward bend   [x]Reduced ability to ambulate prolonged functional periods/distances/surfaces   [x]Reduced ability to ascend/descend stairs   []Reduced ability to run, hop, cut or jump   []other:    Participation Restrictions   [x]Reduced participation in self care activities   [x]Reduced participation in home management activities   [x]Reduced participation in work activities   [x]Reduced participation in social activities. [x]Reduced participation in sport/recreation activities.     Classification :    [x]Signs/symptoms consistent with post-surgical status including

## 2018-02-22 NOTE — FLOWSHEET NOTE
and NMR EXR  [] (96405) Provided verbal/tactile cueing for activities related to strengthening, flexibility, endurance, ROM for improvements in LE, proximal hip, and core control with self care, mobility, lifting, ambulation.  [] (44006) Provided verbal/tactile cueing for activities related to improving balance, coordination, kinesthetic sense, posture, motor skill, proprioception  to assist with LE, proximal hip, and core control in self care, mobility, lifting, ambulation and eccentric single leg control.      NMR and Therapeutic Activities:    [] (87409 or 58469) Provided verbal/tactile cueing for activities related to improving balance, coordination, kinesthetic sense, posture, motor skill, proprioception and motor activation to allow for proper function of core, proximal hip and LE with self care and ADLs  [] (20312) Gait Re-education- Provided training and instruction to the patient for proper LE, core and proximal hip recruitment and positioning and eccentric body weight control with ambulation re-education including up and down stairs     Home Exercise Program:    [x] (85918) Reviewed/Progressed HEP activities related to strengthening, flexibility, endurance, ROM of core, proximal hip and LE for functional self-care, mobility, lifting and ambulation/stair navigation   [] (13383)Reviewed/Progressed HEP activities related to improving balance, coordination, kinesthetic sense, posture, motor skill, proprioception of core, proximal hip and LE for self care, mobility, lifting, and ambulation/stair navigation      Manual Treatments:  PROM / STM / Oscillations-Mobs:  G-I, II, III, IV (PA's, Inf., Post.)  [] (55338) Provided manual therapy to mobilize LE, proximal hip and/or LS spine soft tissue/joints for the purpose of modulating pain, promoting relaxation,  increasing ROM, reducing/eliminating soft tissue swelling/inflammation/restriction, improving soft tissue extensibility and allowing for proper ROM for normal

## 2018-02-28 ENCOUNTER — HOSPITAL ENCOUNTER (OUTPATIENT)
Dept: PHYSICAL THERAPY | Age: 68
Discharge: HOME | End: 2018-03-01
Admitting: PODIATRIST

## 2018-02-28 NOTE — FLOWSHEET NOTE
to improving balance, coordination, kinesthetic sense, posture, motor skill, proprioception  to assist with LE, proximal hip, and core control in self care, mobility, lifting, ambulation and eccentric single leg control. NMR and Therapeutic Activities:    [] (99255 or 97348) Provided verbal/tactile cueing for activities related to improving balance, coordination, kinesthetic sense, posture, motor skill, proprioception and motor activation to allow for proper function of core, proximal hip and LE with self care and ADLs  [] (46815) Gait Re-education- Provided training and instruction to the patient for proper LE, core and proximal hip recruitment and positioning and eccentric body weight control with ambulation re-education including up and down stairs     Home Exercise Program:    [x] (83514) Reviewed/Progressed HEP activities related to strengthening, flexibility, endurance, ROM of core, proximal hip and LE for functional self-care, mobility, lifting and ambulation/stair navigation   [] (72286)Reviewed/Progressed HEP activities related to improving balance, coordination, kinesthetic sense, posture, motor skill, proprioception of core, proximal hip and LE for self care, mobility, lifting, and ambulation/stair navigation      Manual Treatments:  PROM / STM / Oscillations-Mobs:  G-I, II, III, IV (PA's, Inf., Post.)  [x] (82108) Provided manual therapy to mobilize LE, proximal hip and/or LS spine soft tissue/joints for the purpose of modulating pain, promoting relaxation,  increasing ROM, reducing/eliminating soft tissue swelling/inflammation/restriction, improving soft tissue extensibility and allowing for proper ROM for normal function with self care, mobility, lifting and ambulation. Modalities:   Ice to R ankle x10 min    Charges:  Timed Code Treatment Minutes: 35   Total Treatment Minutes: 45     [x] EVAL (LOW) 24540 (typically 20 minutes face-to-face)  [x] KY(11074) x  1   [] IONTO  [] NMR (71380) x Alter current plan (see comments)  [] Plan of care initiated [] Hold pending MD visit [] Discharge    Electronically signed by: Miguel Ángel Montague PT

## 2018-03-01 ENCOUNTER — HOSPITAL ENCOUNTER (OUTPATIENT)
Dept: PHYSICAL THERAPY | Age: 68
Discharge: HOME | End: 2018-03-31
Attending: PODIATRIST | Admitting: PODIATRIST

## 2018-03-01 ENCOUNTER — HOSPITAL ENCOUNTER (OUTPATIENT)
Dept: PHYSICAL THERAPY | Age: 68
Discharge: HOME | End: 2018-03-02
Admitting: PODIATRIST

## 2018-03-01 NOTE — FLOWSHEET NOTE
(typically 20 minutes face-to-face)  [x] MD(01924) x  1   [] IONTO  [] NMR (84823) x      [] VASO  [x] Manual (56398) x  1    [] Other:  [] TA x       [] Mech Traction (08744)  [] ES(attended) (47054)      [] ES (un) (51634):     GOALS:   Patient stated goal: all normal daily activities     Therapist goals for Patient:   Short Term Goals: To be achieved in: 2 weeks  1. Independent in HEP and progression per patient tolerance, in order to prevent re-injury. 2. Patient will have a decrease in pain to facilitate improvement in movement, function, and ADLs as indicated by Functional Deficits.     Long Term Goals: To be achieved in: 12 weeks  1. Disability index score of 30% or less for the LEFS to assist with reaching prior level of function. 2. Patient will demonstrate increased AROM to WNL to allow for proper joint functioning as indicated by patients Functional Deficits. 3. Patient will demonstrate an increase in Strength to good proximal hip strength and control, to 5/5 LE to allow for proper functional mobility as indicated by patients Functional Deficits. 4. Patient will return to all functional activities without increased symptoms or restriction. 5. Pt will D/C boot and amb with normal gait pattern (patient specific functional goal)             Progression Towards Functional goals:  [] Patient is progressing as expected towards functional goals listed. [] Progression is slowed due to complexities listed. [] Progression has been slowed due to co-morbidities. [x] Plan just implemented, too soon to assess goals progression  [] Other:     ASSESSMENT:  Pt tolerated treatment well and will benefit from continued strengthening, stability, and ? ROM within protocol.      Treatment/Activity Tolerance:  [x] Patient tolerated treatment well [] Patient limited by fatique  [] Patient limited by pain  [] Patient limited by other medical complications  [] Other:     Prognosis: [x] Good [] Fair  [] Poor    Patient Requires Follow-up: [x] Yes  [] No    PLAN: See eval  [x] Continue per plan of care [] Alter current plan (see comments)  [] Plan of care initiated [] Hold pending MD visit [] Discharge    Electronically signed by: John Lyn PT

## 2018-03-07 ENCOUNTER — HOSPITAL ENCOUNTER (OUTPATIENT)
Dept: PHYSICAL THERAPY | Age: 68
Discharge: HOME | End: 2018-03-08
Admitting: PODIATRIST

## 2018-03-07 NOTE — FLOWSHEET NOTE
Critical access hospital  Orthopaedics and Sports Rehabilitation, [facility]    Physical Therapy Daily Treatment Note  Date:  3/7/2018    Patient Name:  Madeline Christine    :  1950  MRN: [id number]  Restrictions/Precautions:    Medical/Treatment Diagnosis Information:  Diagnosis: Z09 post op check, M25.571 R ankle pain  Treatment Diagnosis: M25.571 R ankle pain, s/p R Achilles tendon repair and retrocalcaneal exostectomy   Insurance/Certification information:   Medicare  Physician Information:  Referring Practitioner: Dr. Suhail Pizarro of care signed (Y/N): Y    Date of Patient follow up with Physician: 3/20/18    G-Code (if applicable):      Date G-Code Applied:  18       Progress Note: []  Yes  [x]  No  Next due by: Visit #10       Latex Allergy:  [x]NO      []YES  Preferred Language for Healthcare:   [x]English       []other:    Visit # Insurance Allowable   3 Med nec     Pain level:  0/10     SUBJECTIVE:  Pt reports continued minimal to no pain however notices swelling every day.      Pt will be post op 8 wks on 3/16    OBJECTIVE:  Observation:   Test measurements:   Good DF mobility      RESTRICTIONS/PRECAUTIONS: WBAT in boot    Exercises/Interventions:     Therapeutic Ex Sets/sec Reps Notes HEP   AROM: DF/PF, INV/LEONID, CW/CCW 1 30  x   Ankle alphabet 1 2  x   Ankle iso: PF, INV, LEONID 5\" 10  x   Seated HR/TR 1 30  x   Seated toe curls 30\" 2  x   Soleus S  Gastroc S 30\"  30\" 2  2  X  x    10\" 5  x   Tband ankle PF, INV/LEONID 3 10 instructed x                                                           Manual Intervention       PROM, joint mobs x5'      Scar mobs, gastroc rolling x5'                                  NMR re-education       Weight shifting: lateral, fwd, bwd 1 15                                                                 Therapeutic Exercise and NMR EXR  [x] (36238) Provided verbal/tactile cueing for activities related to strengthening, flexibility, endurance, ROM for improvements in LE, proximal hip, and core control with self care, mobility, lifting, ambulation.  [] (06557) Provided verbal/tactile cueing for activities related to improving balance, coordination, kinesthetic sense, posture, motor skill, proprioception  to assist with LE, proximal hip, and core control in self care, mobility, lifting, ambulation and eccentric single leg control. NMR and Therapeutic Activities:    [] (54712 or 66688) Provided verbal/tactile cueing for activities related to improving balance, coordination, kinesthetic sense, posture, motor skill, proprioception and motor activation to allow for proper function of core, proximal hip and LE with self care and ADLs  [] (73025) Gait Re-education- Provided training and instruction to the patient for proper LE, core and proximal hip recruitment and positioning and eccentric body weight control with ambulation re-education including up and down stairs     Home Exercise Program:    [x] (52314) Reviewed/Progressed HEP activities related to strengthening, flexibility, endurance, ROM of core, proximal hip and LE for functional self-care, mobility, lifting and ambulation/stair navigation   [] (67518)Reviewed/Progressed HEP activities related to improving balance, coordination, kinesthetic sense, posture, motor skill, proprioception of core, proximal hip and LE for self care, mobility, lifting, and ambulation/stair navigation      Manual Treatments:  PROM / STM / Oscillations-Mobs:  G-I, II, III, IV (PA's, Inf., Post.)  [x] (90442) Provided manual therapy to mobilize LE, proximal hip and/or LS spine soft tissue/joints for the purpose of modulating pain, promoting relaxation,  increasing ROM, reducing/eliminating soft tissue swelling/inflammation/restriction, improving soft tissue extensibility and allowing for proper ROM for normal function with self care, mobility, lifting and ambulation. Modalities:   Ice to R ankle x10 min    Charges:  Timed Code Treatment Minutes: 35 Total Treatment Minutes: 45     [] EVAL (LOW) 72719 (typically 20 minutes face-to-face)  [x] OLIVERA(22769) x  1   [] IONTO  [] NMR (85699) x      [] VASO  [x] Manual (67348) x  1    [] Other:  [] TA x       [] Mech Traction (66145)  [] ES(attended) (63958)      [] ES (un) (28261):     GOALS:   Patient stated goal: all normal daily activities     Therapist goals for Patient:   Short Term Goals: To be achieved in: 2 weeks  1. Independent in HEP and progression per patient tolerance, in order to prevent re-injury. 2. Patient will have a decrease in pain to facilitate improvement in movement, function, and ADLs as indicated by Functional Deficits.     Long Term Goals: To be achieved in: 12 weeks  1. Disability index score of 30% or less for the LEFS to assist with reaching prior level of function. 2. Patient will demonstrate increased AROM to WNL to allow for proper joint functioning as indicated by patients Functional Deficits. 3. Patient will demonstrate an increase in Strength to good proximal hip strength and control, to 5/5 LE to allow for proper functional mobility as indicated by patients Functional Deficits. 4. Patient will return to all functional activities without increased symptoms or restriction. 5. Pt will D/C boot and amb with normal gait pattern (patient specific functional goal)             Progression Towards Functional goals:  [x] Patient is progressing as expected towards functional goals listed. [] Progression is slowed due to complexities listed. [] Progression has been slowed due to co-morbidities. [] Plan just implemented, too soon to assess goals progression  [] Other:     ASSESSMENT:  Pt continues to exhibit very good ROM and is tolerated treatment well. PT will drop pt to x1/wk until boot is D/C'd to progress to more strengthening.      Treatment/Activity Tolerance:  [x] Patient tolerated treatment well [] Patient limited by fatique  [] Patient limited by pain  [] Patient limited

## 2018-03-14 ENCOUNTER — HOSPITAL ENCOUNTER (OUTPATIENT)
Dept: PHYSICAL THERAPY | Age: 68
Discharge: HOME | End: 2018-03-15
Admitting: PODIATRIST

## 2018-03-14 NOTE — FLOWSHEET NOTE
[facility]  Orthopaedics and Sports Rehabilitation, [facility]    Physical Therapy Daily Treatment Note  Date:  3/14/2018    Patient Name:  Leigha Batres    :  1950  MRN: [id number]  Restrictions/Precautions:    Medical/Treatment Diagnosis Information:  Diagnosis: Z09 post op check, M25.571 R ankle pain  Treatment Diagnosis: M25.571 R ankle pain, s/p R Achilles tendon repair and retrocalcaneal exostectomy   Insurance/Certification information:   Medicare  Physician Information:  Referring Practitioner: Dr. Chapa Place of care signed (Y/N): Y    Date of Patient follow up with Physician: 3/20/18    G-Code (if applicable):      Date G-Code Applied:  18       Progress Note: []  Yes  [x]  No  Next due by: Visit #10       Latex Allergy:  [x]NO      []YES  Preferred Language for Healthcare:   [x]English       []other:    Visit # Insurance Allowable   5 Med nec     Pain level:  4/10     SUBJECTIVE:  Pt states she has noticed ? pain in medial heel that started 2 night ago. Pt states it woke her up and was a sharp pain, feels like a muscle spasm. Pt took a pain pill yesterday that helped during the day but then experienced more pain at night.      Pt will be post op 8 wks on 3/16    OBJECTIVE:  Observation:   Test measurements:   Good DF mobility      RESTRICTIONS/PRECAUTIONS: WBAT in boot    Exercises/Interventions:     Therapeutic Ex Sets/sec Reps Notes HEP   AROM: DF/PF, INV/LEONID, CW/CCW 1 30  x   Ankle alphabet 1 2  x   Ankle iso: PF, INV, LEONID 5\" 10  x   Seated HR/TR 1 30  x   Seated toe curls 30\" 2  x   Soleus S  Gastroc S 30\"  30\" 2  2  X  x    10\" 5  x   Tband ankle PF, INV/LEONID 3 10 RTB x                                                           Manual Intervention       PROM, joint mobs x5'      Scar mobs, gastroc rolling x5'      STM distal achilles/soleus x4'                           NMR re-education       Weight shifting: lateral, fwd, bwd 1 15 extensibility and allowing for proper ROM for normal function with self care, mobility, lifting and ambulation. Modalities: Ice to R ankle x10 min    Charges:  Timed Code Treatment Minutes: 40   Total Treatment Minutes: 45     [] EVAL (LOW) 37120 (typically 20 minutes face-to-face)  [x] FD(56888) x  2   [] IONTO  [] NMR (86553) x      [] VASO  [x] Manual (98069) x  1    [] Other:  [] TA x       [] Mech Traction (90830)  [] ES(attended) (72441)      [] ES (un) (01027):     GOALS:   Patient stated goal: all normal daily activities     Therapist goals for Patient:   Short Term Goals: To be achieved in: 2 weeks  1. Independent in HEP and progression per patient tolerance, in order to prevent re-injury. 2. Patient will have a decrease in pain to facilitate improvement in movement, function, and ADLs as indicated by Functional Deficits.     Long Term Goals: To be achieved in: 12 weeks  1. Disability index score of 30% or less for the LEFS to assist with reaching prior level of function. 2. Patient will demonstrate increased AROM to WNL to allow for proper joint functioning as indicated by patients Functional Deficits. 3. Patient will demonstrate an increase in Strength to good proximal hip strength and control, to 5/5 LE to allow for proper functional mobility as indicated by patients Functional Deficits. 4. Patient will return to all functional activities without increased symptoms or restriction. 5. Pt will D/C boot and amb with normal gait pattern (patient specific functional goal)             Progression Towards Functional goals:  [x] Patient is progressing as expected towards functional goals listed. [] Progression is slowed due to complexities listed. [] Progression has been slowed due to co-morbidities. [] Plan just implemented, too soon to assess goals progression  [] Other:     ASSESSMENT:  Pt reported some ? pain over the last couple of days. PT will monitor.  Pt f/u with MD next week and will

## 2018-03-20 ENCOUNTER — OFFICE VISIT (OUTPATIENT)
Dept: ORTHOPEDIC SURGERY | Age: 68
End: 2018-03-20

## 2018-03-20 VITALS
HEART RATE: 74 BPM | SYSTOLIC BLOOD PRESSURE: 130 MMHG | DIASTOLIC BLOOD PRESSURE: 74 MMHG | WEIGHT: 162.04 LBS | BODY MASS INDEX: 29.82 KG/M2 | HEIGHT: 62 IN

## 2018-03-20 DIAGNOSIS — M77.31 CALCANEAL SPUR OF RIGHT FOOT: ICD-10-CM

## 2018-03-20 DIAGNOSIS — Z09 POSTOP CHECK: ICD-10-CM

## 2018-03-20 DIAGNOSIS — M76.61 ACHILLES TENDINITIS OF RIGHT LOWER EXTREMITY: Primary | ICD-10-CM

## 2018-03-20 PROCEDURE — 99024 POSTOP FOLLOW-UP VISIT: CPT | Performed by: PODIATRIST

## 2018-03-20 RX ORDER — PREDNISONE 10 MG/1
TABLET ORAL
COMMUNITY
Start: 2018-03-09 | End: 2018-06-28 | Stop reason: ALTCHOICE

## 2018-03-20 RX ORDER — PREDNISONE 10 MG/1
TABLET ORAL
Refills: 0 | COMMUNITY
Start: 2018-03-09 | End: 2018-06-28 | Stop reason: ALTCHOICE

## 2018-03-21 ENCOUNTER — HOSPITAL ENCOUNTER (OUTPATIENT)
Dept: PHYSICAL THERAPY | Age: 68
Discharge: HOME | End: 2018-03-22
Admitting: PODIATRIST

## 2018-03-22 ENCOUNTER — HOSPITAL ENCOUNTER (OUTPATIENT)
Dept: PHYSICAL THERAPY | Age: 68
Discharge: HOME | End: 2018-03-23
Admitting: PODIATRIST

## 2018-03-22 NOTE — FLOWSHEET NOTE
cueing for activities related to strengthening, flexibility, endurance, ROM for improvements in LE, proximal hip, and core control with self care, mobility, lifting, ambulation.  [] (71109) Provided verbal/tactile cueing for activities related to improving balance, coordination, kinesthetic sense, posture, motor skill, proprioception  to assist with LE, proximal hip, and core control in self care, mobility, lifting, ambulation and eccentric single leg control.      NMR and Therapeutic Activities:    [] (90367 or 32649) Provided verbal/tactile cueing for activities related to improving balance, coordination, kinesthetic sense, posture, motor skill, proprioception and motor activation to allow for proper function of core, proximal hip and LE with self care and ADLs  [] (04013) Gait Re-education- Provided training and instruction to the patient for proper LE, core and proximal hip recruitment and positioning and eccentric body weight control with ambulation re-education including up and down stairs     Home Exercise Program:    [x] (91989) Reviewed/Progressed HEP activities related to strengthening, flexibility, endurance, ROM of core, proximal hip and LE for functional self-care, mobility, lifting and ambulation/stair navigation   [] (66089)Reviewed/Progressed HEP activities related to improving balance, coordination, kinesthetic sense, posture, motor skill, proprioception of core, proximal hip and LE for self care, mobility, lifting, and ambulation/stair navigation      Manual Treatments:  PROM / STM / Oscillations-Mobs:  G-I, II, III, IV (PA's, Inf., Post.)  [x] (67913) Provided manual therapy to mobilize LE, proximal hip and/or LS spine soft tissue/joints for the purpose of modulating pain, promoting relaxation,  increasing ROM, reducing/eliminating soft tissue swelling/inflammation/restriction, improving soft tissue extensibility and allowing for proper ROM for normal function with self care, mobility, lifting and ambulation. Modalities: Ice to R ankle x10 min    Charges:  Timed Code Treatment Minutes: 42   Total Treatment Minutes: 52     [] EVAL (LOW) 89933 (typically 20 minutes face-to-face)  [x] TZ(10283) x  2   [] IONTO  [] NMR (96266) x      [] VASO  [x] Manual (50647) x  1    [] Other:  [] TA x       [] Mech Traction (17766)  [] ES(attended) (18441)      [] ES (un) (31522):     GOALS:   Patient stated goal: all normal daily activities     Therapist goals for Patient:   Short Term Goals: To be achieved in: 2 weeks  1. Independent in HEP and progression per patient tolerance, in order to prevent re-injury. 2. Patient will have a decrease in pain to facilitate improvement in movement, function, and ADLs as indicated by Functional Deficits.     Long Term Goals: To be achieved in: 12 weeks  1. Disability index score of 30% or less for the LEFS to assist with reaching prior level of function. 2. Patient will demonstrate increased AROM to WNL to allow for proper joint functioning as indicated by patients Functional Deficits. 3. Patient will demonstrate an increase in Strength to good proximal hip strength and control, to 5/5 LE to allow for proper functional mobility as indicated by patients Functional Deficits. 4. Patient will return to all functional activities without increased symptoms or restriction. 5. Pt will D/C boot and amb with normal gait pattern (patient specific functional goal)             Progression Towards Functional goals:  [x] Patient is progressing as expected towards functional goals listed. [] Progression is slowed due to complexities listed. [] Progression has been slowed due to co-morbidities. [] Plan just implemented, too soon to assess goals progression  [] Other:     ASSESSMENT:  Pt continues to exhibit good ankle ROM and will benefit from strengthening as tolerated. Pt will D/C NV.     Treatment/Activity Tolerance:  [x] Patient tolerated treatment well [] Patient limited by

## 2018-03-28 ENCOUNTER — HOSPITAL ENCOUNTER (OUTPATIENT)
Dept: PHYSICAL THERAPY | Age: 68
Discharge: HOME | End: 2018-03-29
Admitting: PODIATRIST

## 2018-03-28 NOTE — FLOWSHEET NOTE
[facility]  Orthopaedics and Sports Rehabilitation, [facility]    Physical Therapy Daily Treatment Note  Date:  3/28/2018    Patient Name:  Jennifer Perdue    :  1950  MRN: [id number]  Restrictions/Precautions:    Medical/Treatment Diagnosis Information:  Diagnosis: Z09 post op check, M25.571 R ankle pain  Treatment Diagnosis: M25.571 R ankle pain, s/p R Achilles tendon repair and retrocalcaneal exostectomy 59  Insurance/Certification information:   Medicare  Physician Information:  Referring Practitioner: Dr. Joann Garcia of care signed (Y/N): Y    Date of Patient follow up with Physician: 3/20/18    G-Code (if applicable):      Date G-Code Applied:  18       Progress Note: []  Yes  [x]  No  Next due by: Visit #10       Latex Allergy:  [x]NO      []YES  Preferred Language for Healthcare:   [x]English       []other:    Visit # Insurance Allowable   8 Med nec     Pain level:  3/10     SUBJECTIVE:  Pt states she has had a tough week. Her  was admitted to the hospital and she went to the ED for LBP. Pt states her ankle feels pretty good overall but does get sore as the day progresses. Pt notes the back of her shoe rubs and it feels uncomfortable.  Pt plans to D/C today    Pt will be post op 10 wks on 3/30    OBJECTIVE:  Observation:   Test measurements:   Good DF mobility      RESTRICTIONS/PRECAUTIONS:     Exercises/Interventions:     Therapeutic Ex Sets/sec Reps Notes HEP   Bike 5'       1 30     Ankle alphabet 1 2  x    5\" 10      1 30      30\" 2     Soleus S 30\"  30\" 2  2  X      10\" 5     Tband ankle PF, INV/LEONID 3 10 Black TB x   Standing HR 3 10  x   SLR  SL hip ABD 2  2 10  10  X  x                               Pt edu: reviewed HEP, to call if further instruction required              Manual Intervention       PROM, joint mobs x5'      Scar mobs, gastroc rolling x5'       x4'                           NMR re-education       , fwd,  1 15 Emphasis on propelling self forward x   SLS 10\"

## 2018-04-01 ENCOUNTER — HOSPITAL ENCOUNTER (OUTPATIENT)
Dept: PHYSICAL THERAPY | Age: 68
Discharge: HOME | End: 2018-04-30
Attending: PODIATRIST | Admitting: PODIATRIST

## 2018-04-11 PROBLEM — Z09 POSTOP CHECK: Status: RESOLVED | Noted: 2018-02-14 | Resolved: 2018-04-11

## 2018-06-26 ENCOUNTER — PAT TELEPHONE (OUTPATIENT)
Dept: PREADMISSION TESTING | Age: 68
End: 2018-06-26

## 2018-06-28 ENCOUNTER — HOSPITAL ENCOUNTER (OUTPATIENT)
Dept: SURGERY | Age: 68
Discharge: HOME | End: 2018-06-28
Attending: PODIATRIST | Admitting: PODIATRIST

## 2018-06-28 VITALS
DIASTOLIC BLOOD PRESSURE: 65 MMHG | BODY MASS INDEX: 29.81 KG/M2 | HEIGHT: 62 IN | RESPIRATION RATE: 20 BRPM | TEMPERATURE: 97.2 F | WEIGHT: 162 LBS | OXYGEN SATURATION: 98 % | HEART RATE: 54 BPM | SYSTOLIC BLOOD PRESSURE: 170 MMHG

## 2018-06-28 DIAGNOSIS — M76.62 TENDONITIS, ACHILLES, LEFT: Primary | ICD-10-CM

## 2018-06-28 DIAGNOSIS — M77.32 CALCANEAL SPUR OF FOOT, LEFT: ICD-10-CM

## 2018-06-28 RX ORDER — SCOLOPAMINE TRANSDERMAL SYSTEM 1 MG/1
1 PATCH, EXTENDED RELEASE TRANSDERMAL
Status: DISCONTINUED | OUTPATIENT
Start: 2018-06-28 | End: 2018-06-28

## 2018-06-28 RX ORDER — SCOLOPAMINE TRANSDERMAL SYSTEM 1 MG/1
PATCH, EXTENDED RELEASE TRANSDERMAL
Status: DISCONTINUED
Start: 2018-06-28 | End: 2018-06-29 | Stop reason: HOSPADM

## 2018-06-28 RX ORDER — APREPITANT 40 MG/1
40 CAPSULE ORAL DAILY
Status: DISCONTINUED | OUTPATIENT
Start: 2018-06-28 | End: 2018-06-28

## 2018-06-28 RX ORDER — BUPIVACAINE HYDROCHLORIDE 5 MG/ML
INJECTION, SOLUTION EPIDURAL; INTRACAUDAL
Status: DISPENSED
Start: 2018-06-28 | End: 2018-06-28

## 2018-06-28 RX ORDER — MIDAZOLAM HYDROCHLORIDE 1 MG/ML
INJECTION INTRAMUSCULAR; INTRAVENOUS
Status: DISPENSED
Start: 2018-06-28 | End: 2018-06-28

## 2018-06-28 RX ORDER — MEPERIDINE HYDROCHLORIDE 25 MG/ML
12.5 INJECTION INTRAMUSCULAR; INTRAVENOUS; SUBCUTANEOUS EVERY 5 MIN PRN
Status: DISCONTINUED | OUTPATIENT
Start: 2018-06-28 | End: 2018-06-29 | Stop reason: HOSPADM

## 2018-06-28 RX ORDER — LABETALOL HYDROCHLORIDE 5 MG/ML
5 INJECTION, SOLUTION INTRAVENOUS EVERY 10 MIN PRN
Status: DISCONTINUED | OUTPATIENT
Start: 2018-06-28 | End: 2018-06-29 | Stop reason: HOSPADM

## 2018-06-28 RX ORDER — OXYCODONE AND ACETAMINOPHEN 7.5; 325 MG/1; MG/1
1 TABLET ORAL EVERY 4 HOURS PRN
Qty: 40 TABLET | Refills: 0 | Status: SHIPPED | OUTPATIENT
Start: 2018-06-28 | End: 2018-07-05

## 2018-06-28 RX ORDER — OXYCODONE HYDROCHLORIDE AND ACETAMINOPHEN 5; 325 MG/1; MG/1
2 TABLET ORAL PRN
Status: COMPLETED | OUTPATIENT
Start: 2018-06-28 | End: 2018-06-28

## 2018-06-28 RX ORDER — MORPHINE SULFATE 10 MG/ML
1 INJECTION, SOLUTION INTRAMUSCULAR; INTRAVENOUS EVERY 5 MIN PRN
Status: DISCONTINUED | OUTPATIENT
Start: 2018-06-28 | End: 2018-06-29 | Stop reason: HOSPADM

## 2018-06-28 RX ORDER — MORPHINE SULFATE 10 MG/ML
2 INJECTION, SOLUTION INTRAMUSCULAR; INTRAVENOUS EVERY 5 MIN PRN
Status: DISCONTINUED | OUTPATIENT
Start: 2018-06-28 | End: 2018-06-29 | Stop reason: HOSPADM

## 2018-06-28 RX ORDER — ONDANSETRON 2 MG/ML
4 INJECTION INTRAMUSCULAR; INTRAVENOUS PRN
Status: DISCONTINUED | OUTPATIENT
Start: 2018-06-28 | End: 2018-06-29 | Stop reason: HOSPADM

## 2018-06-28 RX ORDER — APREPITANT 40 MG/1
40 CAPSULE ORAL DAILY
Status: DISCONTINUED | OUTPATIENT
Start: 2018-06-28 | End: 2018-06-29 | Stop reason: HOSPADM

## 2018-06-28 RX ORDER — ACETAMINOPHEN 10 MG/ML
1000 INJECTION, SOLUTION INTRAVENOUS ONCE
Status: COMPLETED | OUTPATIENT
Start: 2018-06-28 | End: 2018-06-28

## 2018-06-28 RX ORDER — HYDROMORPHONE HCL 110MG/55ML
0.5 PATIENT CONTROLLED ANALGESIA SYRINGE INTRAVENOUS EVERY 5 MIN PRN
Status: DISCONTINUED | OUTPATIENT
Start: 2018-06-28 | End: 2018-06-29 | Stop reason: HOSPADM

## 2018-06-28 RX ORDER — LIDOCAINE HYDROCHLORIDE 10 MG/ML
1 INJECTION, SOLUTION EPIDURAL; INFILTRATION; INTRACAUDAL; PERINEURAL
Status: COMPLETED | OUTPATIENT
Start: 2018-06-28 | End: 2018-06-28

## 2018-06-28 RX ORDER — DEXAMETHASONE SODIUM PHOSPHATE 10 MG/ML
INJECTION, SOLUTION INTRAMUSCULAR; INTRAVENOUS
Status: DISPENSED
Start: 2018-06-28 | End: 2018-06-28

## 2018-06-28 RX ORDER — SODIUM CHLORIDE, SODIUM LACTATE, POTASSIUM CHLORIDE, CALCIUM CHLORIDE 600; 310; 30; 20 MG/100ML; MG/100ML; MG/100ML; MG/100ML
INJECTION, SOLUTION INTRAVENOUS CONTINUOUS
Status: DISCONTINUED | OUTPATIENT
Start: 2018-06-28 | End: 2018-06-29 | Stop reason: HOSPADM

## 2018-06-28 RX ORDER — HYDROMORPHONE HCL 110MG/55ML
0.25 PATIENT CONTROLLED ANALGESIA SYRINGE INTRAVENOUS EVERY 5 MIN PRN
Status: DISCONTINUED | OUTPATIENT
Start: 2018-06-28 | End: 2018-06-29 | Stop reason: HOSPADM

## 2018-06-28 RX ORDER — PROMETHAZINE HYDROCHLORIDE 25 MG/1
25 TABLET ORAL EVERY 6 HOURS PRN
Qty: 30 TABLET | Refills: 2 | Status: SHIPPED | OUTPATIENT
Start: 2018-06-28 | End: 2019-06-28

## 2018-06-28 RX ORDER — SCOLOPAMINE TRANSDERMAL SYSTEM 1 MG/1
1 PATCH, EXTENDED RELEASE TRANSDERMAL
Status: DISCONTINUED | OUTPATIENT
Start: 2018-06-28 | End: 2018-06-29 | Stop reason: HOSPADM

## 2018-06-28 RX ORDER — PROMETHAZINE HYDROCHLORIDE 25 MG/ML
6.25 INJECTION, SOLUTION INTRAMUSCULAR; INTRAVENOUS
Status: DISCONTINUED | OUTPATIENT
Start: 2018-06-28 | End: 2018-06-29 | Stop reason: HOSPADM

## 2018-06-28 RX ORDER — HYDRALAZINE HYDROCHLORIDE 20 MG/ML
5 INJECTION INTRAMUSCULAR; INTRAVENOUS EVERY 10 MIN PRN
Status: DISCONTINUED | OUTPATIENT
Start: 2018-06-28 | End: 2018-06-29 | Stop reason: HOSPADM

## 2018-06-28 RX ORDER — SODIUM CHLORIDE 0.9 % (FLUSH) 0.9 %
10 SYRINGE (ML) INJECTION EVERY 12 HOURS SCHEDULED
Status: DISCONTINUED | OUTPATIENT
Start: 2018-06-28 | End: 2018-06-29 | Stop reason: HOSPADM

## 2018-06-28 RX ORDER — DIPHENHYDRAMINE HYDROCHLORIDE 50 MG/ML
12.5 INJECTION INTRAMUSCULAR; INTRAVENOUS
Status: ACTIVE | OUTPATIENT
Start: 2018-06-28 | End: 2018-06-28

## 2018-06-28 RX ORDER — OXYCODONE HYDROCHLORIDE AND ACETAMINOPHEN 5; 325 MG/1; MG/1
1 TABLET ORAL PRN
Status: COMPLETED | OUTPATIENT
Start: 2018-06-28 | End: 2018-06-28

## 2018-06-28 RX ORDER — SODIUM CHLORIDE 0.9 % (FLUSH) 0.9 %
10 SYRINGE (ML) INJECTION PRN
Status: DISCONTINUED | OUTPATIENT
Start: 2018-06-28 | End: 2018-06-29 | Stop reason: HOSPADM

## 2018-06-28 RX ORDER — APREPITANT 40 MG/1
CAPSULE ORAL
Status: COMPLETED
Start: 2018-06-28 | End: 2018-06-28

## 2018-06-28 RX ADMIN — SODIUM CHLORIDE, SODIUM LACTATE, POTASSIUM CHLORIDE, CALCIUM CHLORIDE: 600; 310; 30; 20 INJECTION, SOLUTION INTRAVENOUS at 08:46

## 2018-06-28 RX ADMIN — APREPITANT 40 MG: 40 CAPSULE ORAL at 08:38

## 2018-06-28 RX ADMIN — ACETAMINOPHEN 1000 MG: 10 INJECTION, SOLUTION INTRAVENOUS at 08:48

## 2018-06-28 RX ADMIN — OXYCODONE HYDROCHLORIDE AND ACETAMINOPHEN 2 TABLET: 5; 325 TABLET ORAL at 12:00

## 2018-06-28 RX ADMIN — LIDOCAINE HYDROCHLORIDE 0.1 ML: 10 INJECTION, SOLUTION EPIDURAL; INFILTRATION; INTRACAUDAL; PERINEURAL at 08:44

## 2018-06-28 ASSESSMENT — PAIN - FUNCTIONAL ASSESSMENT: PAIN_FUNCTIONAL_ASSESSMENT: 0-10

## 2018-06-28 ASSESSMENT — PAIN SCALES - GENERAL
PAINLEVEL_OUTOF10: 0
PAINLEVEL_OUTOF10: 7

## 2019-05-08 ENCOUNTER — HOSPITAL ENCOUNTER (OUTPATIENT)
Dept: MRI IMAGING | Age: 69
Discharge: HOME | End: 2019-05-08
Payer: MEDICARE

## 2019-05-08 DIAGNOSIS — K86.9 PANCREATIC LESION: ICD-10-CM

## 2019-05-08 PROCEDURE — 6360000004 HC RX CONTRAST MEDICATION: Performed by: NURSE PRACTITIONER

## 2019-05-08 PROCEDURE — 74183 MRI ABD W/O CNTR FLWD CNTR: CPT

## 2019-05-08 PROCEDURE — A9579 GAD-BASE MR CONTRAST NOS,1ML: HCPCS | Performed by: NURSE PRACTITIONER

## 2019-05-08 RX ADMIN — GADOTERIDOL 15 ML: 279.3 INJECTION, SOLUTION INTRAVENOUS at 10:42

## 2019-05-23 ENCOUNTER — ANESTHESIA EVENT (OUTPATIENT)
Dept: ENDOSCOPY | Age: 69
End: 2019-05-23
Payer: MEDICARE

## 2019-05-23 ENCOUNTER — ANESTHESIA (OUTPATIENT)
Dept: ENDOSCOPY | Age: 69
End: 2019-05-23
Payer: MEDICARE

## 2019-05-23 ENCOUNTER — HOSPITAL ENCOUNTER (OUTPATIENT)
Age: 69
Setting detail: OUTPATIENT SURGERY
Discharge: HOME | End: 2019-05-23
Attending: INTERNAL MEDICINE | Admitting: INTERNAL MEDICINE
Payer: MEDICARE

## 2019-05-23 VITALS
TEMPERATURE: 97.6 F | DIASTOLIC BLOOD PRESSURE: 71 MMHG | WEIGHT: 175 LBS | OXYGEN SATURATION: 93 % | SYSTOLIC BLOOD PRESSURE: 147 MMHG | HEIGHT: 62 IN | BODY MASS INDEX: 32.2 KG/M2 | HEART RATE: 54 BPM | RESPIRATION RATE: 16 BRPM

## 2019-05-23 VITALS
RESPIRATION RATE: 7 BRPM | SYSTOLIC BLOOD PRESSURE: 209 MMHG | OXYGEN SATURATION: 98 % | DIASTOLIC BLOOD PRESSURE: 110 MMHG

## 2019-05-23 PROCEDURE — 2580000003 HC RX 258: Performed by: ANESTHESIOLOGY

## 2019-05-23 PROCEDURE — 3700000000 HC ANESTHESIA ATTENDED CARE: Performed by: INTERNAL MEDICINE

## 2019-05-23 PROCEDURE — 2720000010 HC SURG SUPPLY STERILE: Performed by: INTERNAL MEDICINE

## 2019-05-23 PROCEDURE — 3700000001 HC ADD 15 MINUTES (ANESTHESIA): Performed by: INTERNAL MEDICINE

## 2019-05-23 PROCEDURE — 2500000003 HC RX 250 WO HCPCS: Performed by: NURSE ANESTHETIST, CERTIFIED REGISTERED

## 2019-05-23 PROCEDURE — 88313 SPECIAL STAINS GROUP 2: CPT

## 2019-05-23 PROCEDURE — 3609012400 HC EGD TRANSORAL BIOPSY SINGLE/MULTIPLE: Performed by: INTERNAL MEDICINE

## 2019-05-23 PROCEDURE — 3609018500 HC EGD US SCOPE W/ADJACENT STRUCTURES: Performed by: INTERNAL MEDICINE

## 2019-05-23 PROCEDURE — 7100000011 HC PHASE II RECOVERY - ADDTL 15 MIN: Performed by: INTERNAL MEDICINE

## 2019-05-23 PROCEDURE — 2500000003 HC RX 250 WO HCPCS: Performed by: ANESTHESIOLOGY

## 2019-05-23 PROCEDURE — 7100000010 HC PHASE II RECOVERY - FIRST 15 MIN: Performed by: INTERNAL MEDICINE

## 2019-05-23 PROCEDURE — 6360000002 HC RX W HCPCS: Performed by: INTERNAL MEDICINE

## 2019-05-23 PROCEDURE — 6360000002 HC RX W HCPCS: Performed by: ANESTHESIOLOGY

## 2019-05-23 PROCEDURE — 88307 TISSUE EXAM BY PATHOLOGIST: CPT

## 2019-05-23 PROCEDURE — 88305 TISSUE EXAM BY PATHOLOGIST: CPT

## 2019-05-23 PROCEDURE — 2709999900 HC NON-CHARGEABLE SUPPLY: Performed by: INTERNAL MEDICINE

## 2019-05-23 PROCEDURE — 88173 CYTOPATH EVAL FNA REPORT: CPT

## 2019-05-23 RX ORDER — SODIUM CHLORIDE, SODIUM LACTATE, POTASSIUM CHLORIDE, CALCIUM CHLORIDE 600; 310; 30; 20 MG/100ML; MG/100ML; MG/100ML; MG/100ML
INJECTION, SOLUTION INTRAVENOUS CONTINUOUS PRN
Status: DISCONTINUED | OUTPATIENT
Start: 2019-05-23 | End: 2019-05-23 | Stop reason: SDUPTHER

## 2019-05-23 RX ORDER — MORPHINE SULFATE 10 MG/ML
1 INJECTION, SOLUTION INTRAMUSCULAR; INTRAVENOUS EVERY 5 MIN PRN
Status: DISCONTINUED | OUTPATIENT
Start: 2019-05-23 | End: 2019-05-23 | Stop reason: HOSPADM

## 2019-05-23 RX ORDER — OXYCODONE HYDROCHLORIDE AND ACETAMINOPHEN 5; 325 MG/1; MG/1
2 TABLET ORAL PRN
Status: DISCONTINUED | OUTPATIENT
Start: 2019-05-23 | End: 2019-05-23 | Stop reason: HOSPADM

## 2019-05-23 RX ORDER — OXYCODONE HYDROCHLORIDE AND ACETAMINOPHEN 5; 325 MG/1; MG/1
1 TABLET ORAL PRN
Status: DISCONTINUED | OUTPATIENT
Start: 2019-05-23 | End: 2019-05-23 | Stop reason: HOSPADM

## 2019-05-23 RX ORDER — PROPOFOL 10 MG/ML
INJECTION, EMULSION INTRAVENOUS PRN
Status: DISCONTINUED | OUTPATIENT
Start: 2019-05-23 | End: 2019-05-23 | Stop reason: SDUPTHER

## 2019-05-23 RX ORDER — CIPROFLOXACIN 2 MG/ML
400 INJECTION, SOLUTION INTRAVENOUS ONCE
Status: DISCONTINUED | OUTPATIENT
Start: 2019-05-23 | End: 2019-05-23 | Stop reason: HOSPADM

## 2019-05-23 RX ORDER — DEXAMETHASONE SODIUM PHOSPHATE 10 MG/ML
INJECTION INTRAMUSCULAR; INTRAVENOUS PRN
Status: DISCONTINUED | OUTPATIENT
Start: 2019-05-23 | End: 2019-05-23 | Stop reason: SDUPTHER

## 2019-05-23 RX ORDER — HYDROMORPHONE HCL 110MG/55ML
0.25 PATIENT CONTROLLED ANALGESIA SYRINGE INTRAVENOUS EVERY 5 MIN PRN
Status: DISCONTINUED | OUTPATIENT
Start: 2019-05-23 | End: 2019-05-23 | Stop reason: HOSPADM

## 2019-05-23 RX ORDER — MEPERIDINE HYDROCHLORIDE 50 MG/ML
12.5 INJECTION INTRAMUSCULAR; INTRAVENOUS; SUBCUTANEOUS EVERY 5 MIN PRN
Status: DISCONTINUED | OUTPATIENT
Start: 2019-05-23 | End: 2019-05-23 | Stop reason: HOSPADM

## 2019-05-23 RX ORDER — VITAMIN E 268 MG
400 CAPSULE ORAL 2 TIMES DAILY
COMMUNITY

## 2019-05-23 RX ORDER — SUCCINYLCHOLINE CHLORIDE 20 MG/ML
INJECTION INTRAMUSCULAR; INTRAVENOUS PRN
Status: DISCONTINUED | OUTPATIENT
Start: 2019-05-23 | End: 2019-05-23 | Stop reason: SDUPTHER

## 2019-05-23 RX ORDER — ONDANSETRON 2 MG/ML
INJECTION INTRAMUSCULAR; INTRAVENOUS PRN
Status: DISCONTINUED | OUTPATIENT
Start: 2019-05-23 | End: 2019-05-23 | Stop reason: SDUPTHER

## 2019-05-23 RX ORDER — MORPHINE SULFATE 10 MG/ML
2 INJECTION, SOLUTION INTRAMUSCULAR; INTRAVENOUS EVERY 5 MIN PRN
Status: DISCONTINUED | OUTPATIENT
Start: 2019-05-23 | End: 2019-05-23 | Stop reason: HOSPADM

## 2019-05-23 RX ORDER — DIPHENHYDRAMINE HYDROCHLORIDE 50 MG/ML
12.5 INJECTION INTRAMUSCULAR; INTRAVENOUS
Status: DISCONTINUED | OUTPATIENT
Start: 2019-05-23 | End: 2019-05-23 | Stop reason: HOSPADM

## 2019-05-23 RX ORDER — ROCURONIUM BROMIDE 10 MG/ML
INJECTION, SOLUTION INTRAVENOUS PRN
Status: DISCONTINUED | OUTPATIENT
Start: 2019-05-23 | End: 2019-05-23 | Stop reason: SDUPTHER

## 2019-05-23 RX ORDER — FENTANYL CITRATE 50 UG/ML
INJECTION, SOLUTION INTRAMUSCULAR; INTRAVENOUS PRN
Status: DISCONTINUED | OUTPATIENT
Start: 2019-05-23 | End: 2019-05-23 | Stop reason: SDUPTHER

## 2019-05-23 RX ORDER — CIPROFLOXACIN 2 MG/ML
200 INJECTION, SOLUTION INTRAVENOUS ONCE
Status: COMPLETED | OUTPATIENT
Start: 2019-05-23 | End: 2019-05-23

## 2019-05-23 RX ORDER — HYDRALAZINE HYDROCHLORIDE 20 MG/ML
5 INJECTION INTRAMUSCULAR; INTRAVENOUS EVERY 10 MIN PRN
Status: DISCONTINUED | OUTPATIENT
Start: 2019-05-23 | End: 2019-05-23 | Stop reason: HOSPADM

## 2019-05-23 RX ORDER — HYDROMORPHONE HCL 110MG/55ML
0.5 PATIENT CONTROLLED ANALGESIA SYRINGE INTRAVENOUS EVERY 5 MIN PRN
Status: DISCONTINUED | OUTPATIENT
Start: 2019-05-23 | End: 2019-05-23 | Stop reason: HOSPADM

## 2019-05-23 RX ORDER — PROMETHAZINE HYDROCHLORIDE 25 MG/ML
6.25 INJECTION, SOLUTION INTRAMUSCULAR; INTRAVENOUS
Status: DISCONTINUED | OUTPATIENT
Start: 2019-05-23 | End: 2019-05-23 | Stop reason: HOSPADM

## 2019-05-23 RX ORDER — ONDANSETRON 2 MG/ML
4 INJECTION INTRAMUSCULAR; INTRAVENOUS PRN
Status: DISCONTINUED | OUTPATIENT
Start: 2019-05-23 | End: 2019-05-23 | Stop reason: HOSPADM

## 2019-05-23 RX ORDER — SODIUM CHLORIDE, SODIUM LACTATE, POTASSIUM CHLORIDE, CALCIUM CHLORIDE 600; 310; 30; 20 MG/100ML; MG/100ML; MG/100ML; MG/100ML
INJECTION, SOLUTION INTRAVENOUS CONTINUOUS
Status: DISCONTINUED | OUTPATIENT
Start: 2019-05-23 | End: 2019-05-23 | Stop reason: HOSPADM

## 2019-05-23 RX ORDER — LABETALOL HYDROCHLORIDE 5 MG/ML
5 INJECTION, SOLUTION INTRAVENOUS EVERY 10 MIN PRN
Status: DISCONTINUED | OUTPATIENT
Start: 2019-05-23 | End: 2019-05-23 | Stop reason: HOSPADM

## 2019-05-23 RX ADMIN — PROPOFOL 160 MG: 10 INJECTION, EMULSION INTRAVENOUS at 11:52

## 2019-05-23 RX ADMIN — SUCCINYLCHOLINE CHLORIDE 100 MG: 20 INJECTION, SOLUTION INTRAMUSCULAR; INTRAVENOUS at 11:52

## 2019-05-23 RX ADMIN — SODIUM CHLORIDE, POTASSIUM CHLORIDE, SODIUM LACTATE AND CALCIUM CHLORIDE: 600; 310; 30; 20 INJECTION, SOLUTION INTRAVENOUS at 11:52

## 2019-05-23 RX ADMIN — DEXAMETHASONE SODIUM PHOSPHATE 10 MG: 10 INJECTION INTRAMUSCULAR; INTRAVENOUS at 11:52

## 2019-05-23 RX ADMIN — CIPROFLOXACIN 400 MG: 2 INJECTION, SOLUTION INTRAVENOUS at 12:00

## 2019-05-23 RX ADMIN — ONDANSETRON 4 MG: 2 INJECTION, SOLUTION INTRAMUSCULAR; INTRAVENOUS at 11:52

## 2019-05-23 RX ADMIN — ROCURONIUM BROMIDE 20 MG: 10 INJECTION, SOLUTION INTRAVENOUS at 12:03

## 2019-05-23 RX ADMIN — ROCURONIUM BROMIDE 10 MG: 10 INJECTION, SOLUTION INTRAVENOUS at 11:52

## 2019-05-23 RX ADMIN — SUGAMMADEX 150 MG: 100 INJECTION, SOLUTION INTRAVENOUS at 12:35

## 2019-05-23 RX ADMIN — FENTANYL CITRATE 50 MCG: 50 INJECTION INTRAMUSCULAR; INTRAVENOUS at 11:52

## 2019-05-23 ASSESSMENT — PULMONARY FUNCTION TESTS
PIF_VALUE: 25
PIF_VALUE: 26
PIF_VALUE: 27
PIF_VALUE: 21
PIF_VALUE: 26
PIF_VALUE: 26
PIF_VALUE: 17
PIF_VALUE: 26
PIF_VALUE: 22
PIF_VALUE: 24
PIF_VALUE: 7
PIF_VALUE: 1
PIF_VALUE: 25
PIF_VALUE: 2
PIF_VALUE: 26
PIF_VALUE: 26
PIF_VALUE: 21
PIF_VALUE: 21
PIF_VALUE: 26
PIF_VALUE: 7
PIF_VALUE: 25
PIF_VALUE: 0
PIF_VALUE: 19
PIF_VALUE: 19
PIF_VALUE: 26
PIF_VALUE: 19
PIF_VALUE: 18
PIF_VALUE: 20
PIF_VALUE: 0
PIF_VALUE: 26
PIF_VALUE: 38
PIF_VALUE: 0
PIF_VALUE: 19
PIF_VALUE: 26
PIF_VALUE: 25
PIF_VALUE: 22
PIF_VALUE: 19
PIF_VALUE: 26
PIF_VALUE: 20
PIF_VALUE: 21
PIF_VALUE: 0
PIF_VALUE: 25
PIF_VALUE: 26

## 2019-05-23 ASSESSMENT — PAIN - FUNCTIONAL ASSESSMENT: PAIN_FUNCTIONAL_ASSESSMENT: 0-10

## 2019-05-23 NOTE — H&P
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    Past Medical History:   Diagnosis Date    Anesthesia complication     aspiration pneumonia X 1    Arthritis     Bladder infection     antibiotic completed 14    Depression     Hyperlipidemia     Hypertension     Kidney stones     Localized osteoarthrosis not specified whether primary or secondary, lower leg 10/2/2014    Ocular migraine     PONV (postoperative nausea and vomiting)     developed aspiration pneumonia    Reflux     Wears glasses      Past Surgical History:   Procedure Laterality Date    ACHILLES TENDON SURGERY Right 2018    BACK SURGERY      steroid injections    BUNIONECTOMY Right 13    SILVER BUNIONECTOMY RIGHT FOOT; ARTHODESIS RIGHT SECOND DIGIT; EXOSTECTOMY RIGHT HALLUX; CAPSULOTOMY AND TENOTOMY RIGHT SECOND METATARSOPHANLANGEAL JOINT    CARPAL TUNNEL RELEASE Bilateral     CHOLECYSTECTOMY      FOOT SURGERY Left 2018    HEMORRHOID SURGERY      KIDNEY STONE SURGERY      KNEE ARTHROSCOPY Left     KNEE ARTHROSCOPY Right     LITHOTRIPSY      ROTATOR CUFF REPAIR      left    SHOULDER ARTHROSCOPY  12    with rotator cuff repair on right    SIGMOIDOSCOPY      TONSILLECTOMY      TUBAL LIGATION         Social:   Social History     Tobacco Use    Smoking status: Former Smoker     Last attempt to quit: 1981     Years since quittin.7    Smokeless tobacco: Never Used   Substance Use Topics    Alcohol use: Yes     Comment: 2 drinks per year     Family:   Family History   Problem Relation Age of Onset    Heart Disease Father         heart mumur    Stroke Father     High Blood Pressure Father     Arthritis Mother     High Blood Pressure Mother     High Blood Pressure Sister     Emphysema Sister     Cancer Brother     Diabetes Brother     Stroke Brother     High Blood Pressure Brother     Cancer Brother     High Blood Pressure Sister     Heart Disease Sister     Cancer Sister     No Known Problems Maternal Aunt     No Known Problems Maternal Uncle     No Known Problems Paternal Aunt     No Known Problems Paternal Uncle     No Known Problems Maternal Grandmother     No Known Problems Maternal Grandfather     No Known Problems Paternal Grandmother     No Known Problems Paternal Grandfather     No Known Problems Other     Anesth Problems Neg Hx     Broken Bones Neg Hx     Clotting Disorder Neg Hx     Collagen Disease Neg Hx     Dislocations Neg Hx     Osteoporosis Neg Hx     Rheumatologic Disease Neg Hx     Scoliosis Neg Hx     Severe Sprains Neg Hx        Scheduled Medications:     Current Medications:    Prior to Admission medications    Medication Sig Start Date End Date Taking?  Authorizing Provider   vitamin E 400 UNIT capsule Take 400 Units by mouth 2 times daily   Yes Historical Provider, MD   citalopram (CELEXA) 20 MG tablet Take 20 mg by mouth daily  11/21/17  Yes Historical Provider, MD   diclofenac sodium 1 % GEL Apply 4 g topically as needed  9/26/17  Yes Historical Provider, MD   hydrALAZINE (APRESOLINE) 10 MG tablet Take 10 mg by mouth 10/27/17  Yes Historical Provider, MD   losartan (COZAAR) 100 MG tablet Take 100 mg by mouth daily  9/25/17  Yes Historical Provider, MD   metoprolol succinate (TOPROL XL) 50 MG extended release tablet Take 50 mg by mouth nightly  8/21/17  Yes Historical Provider, MD   atorvastatin (LIPITOR) 10 MG tablet Take 5 mg by mouth daily  6/6/17  Yes Historical Provider, MD   COCONUT OIL PO Take 1,000 mg by mouth   Yes Historical Provider, MD   Ascorbic Acid (VITAMIN C PO) Take 1,000 mg by mouth daily   Yes Historical Provider, MD   docusate sodium (COLACE) 100 MG capsule Take 200 mg by mouth daily   Yes Historical Provider, MD   Glucosamine HCl (GLUCOSAMINE PO) Take by mouth daily   Yes Historical Provider, MD   omeprazole (PRILOSEC) 20 MG capsule Take 20 mg by mouth Daily  12/10/14  Yes Historical Provider, MD   triamcinolone (KENALOG) 0.1 % cream Apply topically 2 times daily as needed    Yes Historical Provider, MD   vitamin D (CHOLECALCIFEROL) 1000 UNIT TABS tablet Take 1,000 Units by mouth daily. Yes Historical Provider, MD   promethazine (PHENERGAN) 25 MG tablet Take 1 tablet by mouth every 6 hours as needed for Nausea 6/28/18 6/28/19  Patric Valle DPM         Current Facility-Administered Medications:     lactated ringers infusion, , Intravenous, Continuous, Jess Freitas DO      Infusions:    lactated ringers       PRN Medications: Allergies: Allergies   Allergen Reactions    Allegra Allergy [Fexofenadine Hydrochloride] Nausea Only and Other (See Comments)     Muscle pain    Bactrim I.V. [Sulfamethoxazole-Trimethoprim] Itching    Celecoxib Nausea Only and Other (See Comments)     Other reaction(s): Confusion  Affects judgement    Gabapentin Nausea Only     Other reaction(s): Confusion  Poor judgement    Lamisil [Terbinafine] Itching    Neurontin [Gabapentin] Other (See Comments)     Poor judgement    Statins Depletion Therapy Other (See Comments)     Muscle pain    Sulfamethoxazole-Trimethoprim Itching    Sulfamethoxazole-Trimethoprim Itching and Rash         Objective:     Physical Exam:   BP (!) 156/68   Pulse 54   Temp 98.2 °F (36.8 °C) (Temporal)   Resp 16   Ht 5' 2\" (1.575 m)   Wt 175 lb (79.4 kg)   SpO2 97%   BMI 32.01 kg/m²     HEENT: NCAT  Lungs: CTAB  CV: RRR  Abd: soft, ntd  Ext: dpi    Lab and Imaging Review   Labs:  CBC: No results for input(s): WBC, HGB, HCT, MCV, PLT in the last 72 hours. BMP: No results for input(s): NA, K, CL, CO2, PHOS, BUN, CREATININE in the last 72 hours.     Invalid input(s): CA  LIVER PROFILE: No results for input(s): AST, ALT, LIPASE,

## 2019-05-23 NOTE — ANESTHESIA PRE PROCEDURE
Sulfamethoxazole-Trimethoprim Itching and Rash       Problem List:    Patient Active Problem List   Diagnosis Code    Heel spur M77.30    Achilles tendinitis M76.60    Localized osteoarthrosis, lower leg M17.10    Tear of medial cartilage or meniscus of knee, current [id number]    S/P arthroscopy of knee Z98.890    Primary osteoarthritis of left knee M17.12    Acute medial meniscus tear of right knee S83.241A    Primary osteoarthritis of knees, bilateral M17.0    Achilles tendinitis, left leg M76.62    Hallux valgus M20.10    Right Achilles tendinitis M76.61    Achilles tendinitis, right leg M76.61       Past Medical History:        Diagnosis Date    Anesthesia complication     aspiration pneumonia X 1    Arthritis     Bladder infection     antibiotic completed 12/21/14    Depression     Hyperlipidemia     Hypertension     Kidney stones     Localized osteoarthrosis not specified whether primary or secondary, lower leg 10/2/2014    Ocular migraine     PONV (postoperative nausea and vomiting)     developed aspiration pneumonia    Reflux     Wears glasses        Past Surgical History:        Procedure Laterality Date    ACHILLES TENDON SURGERY Right 01/19/2018    BACK SURGERY      steroid injections    BUNIONECTOMY Right 11/14/13    SILVER BUNIONECTOMY RIGHT FOOT; ARTHODESIS RIGHT SECOND DIGIT; EXOSTECTOMY RIGHT HALLUX; CAPSULOTOMY AND TENOTOMY RIGHT SECOND METATARSOPHANLANGEAL JOINT    CARPAL TUNNEL RELEASE Bilateral     CHOLECYSTECTOMY      FOOT SURGERY Left 06/28/2018    HEMORRHOID SURGERY      KIDNEY STONE SURGERY      KNEE ARTHROSCOPY Left 2014    KNEE ARTHROSCOPY Right     LITHOTRIPSY      ROTATOR CUFF REPAIR      left    SHOULDER ARTHROSCOPY  8/29/12    with rotator cuff repair on right    SIGMOIDOSCOPY      TONSILLECTOMY      TUBAL LIGATION         Social History:    Social History     Tobacco Use    Smoking status: Former Smoker     Last attempt to quit: 8/24/1981 Years since quittin.7    Smokeless tobacco: Never Used   Substance Use Topics    Alcohol use: Yes     Comment: 2 drinks per year                                Counseling given: Not Answered      Vital Signs (Current): There were no vitals filed for this visit. BP Readings from Last 3 Encounters:   18 (!) 170/65   18 (!) 163/76   18 130/74       NPO Status:                                                                                 BMI:   Wt Readings from Last 3 Encounters:   18 162 lb (73.5 kg)   18 160 lb (72.6 kg)   18 162 lb 0.6 oz (73.5 kg)     There is no height or weight on file to calculate BMI.    CBC:   Lab Results   Component Value Date    WBC 7.0 2018    RBC 4.07 2018    HGB 13.3 2018    HCT 38.2 2018    MCV 93.8 2018    RDW 13.1 2018     2018       CMP:   Lab Results   Component Value Date     2018    K 4.4 2018     2018    CO2 26 2018    BUN 15 2018    CREATININE 0.6 2018    GFRAA >60 2018    AGRATIO 1.5 2018    LABGLOM >60 2018    GLUCOSE 110 2018    PROT 6.6 2018    PROT 7.5 2011    CALCIUM 8.9 2018    BILITOT 0.6 2018    ALKPHOS 46 2018    AST 23 2018    ALT 27 2018       POC Tests: No results for input(s): POCGLU, POCNA, POCK, POCCL, POCBUN, POCHEMO, POCHCT in the last 72 hours. Coags: No results found for: PROTIME, INR, APTT    HCG (If Applicable): No results found for: PREGTESTUR, PREGSERUM, HCG, HCGQUANT     ABGs: No results found for: PHART, PO2ART, WCF7KII, APL1DQW, BEART, O0NVBBUT     Type & Screen (If Applicable):  No results found for: LABABO, [address]    Anesthesia Evaluation  Patient summary reviewed and Nursing notes reviewed   history of anesthetic complications (aspiration): PONV.   Airway: Mallampati: II     Neck ROM: full   Dental: Pulmonary:Negative Pulmonary ROS and normal exam                               Cardiovascular:Negative CV ROS    (+) hypertension:, hyperlipidemia                  Neuro/Psych:   Negative Neuro/Psych ROS  (+) headaches:, psychiatric history:            GI/Hepatic/Renal: Neg GI/Hepatic/Renal ROS  (+) GERD: well controlled, renal disease: kidney stones,      (-) hiatal hernia       Endo/Other: Negative Endo/Other ROS   (+) : arthritis:., .                 Abdominal:           Vascular:                                      Anesthesia Plan      general     ASA 3     (I discussed with the patient the risks and benefits of PIV, general anesthesia, IV Narcotics, PACU. All questions were answered the patient agrees with the plan.)  Induction: intravenous. Pre-Operative Diagnosis: PANCREATIC LESION    76 y.o.   BMI:  Body mass index is 32.01 kg/m².      Vitals:    19 1051   BP: (!) 156/68   Pulse: 54   Resp: 16   Temp: 98.2 °F (36.8 °C)   TempSrc: Temporal   SpO2: 97%   Weight: 175 lb (79.4 kg)   Height: 5' 2\" (1.575 m)       Allergies   Allergen Reactions    Allegra Allergy [Fexofenadine Hydrochloride] Nausea Only and Other (See Comments)     Muscle pain    Bactrim I.V. [Sulfamethoxazole-Trimethoprim] Itching    Celecoxib Nausea Only and Other (See Comments)     Other reaction(s): Confusion  Affects judgement    Gabapentin Nausea Only     Other reaction(s): Confusion  Poor judgement    Lamisil [Terbinafine] Itching    Neurontin [Gabapentin] Other (See Comments)     Poor judgement    Statins Depletion Therapy Other (See Comments)     Muscle pain    Sulfamethoxazole-Trimethoprim Itching    Sulfamethoxazole-Trimethoprim Itching and Rash       Social History     Tobacco Use    Smoking status: Former Smoker     Last attempt to quit: 1981     Years since quittin.7    Smokeless tobacco: Never Used   Substance Use Topics    Alcohol use: Yes     Comment: 2 drinks per year LABS:    CBC  Lab Results   Component Value Date/Time    WBC 7.0 03/24/2018 10:45 AM    HGB 13.3 03/24/2018 10:45 AM    HCT 38.2 03/24/2018 10:45 AM     03/24/2018 10:45 AM     RENAL  Lab Results   Component Value Date/Time     03/24/2018 10:45 AM    K 4.4 03/24/2018 10:45 AM     03/24/2018 10:45 AM    CO2 26 03/24/2018 10:45 AM    BUN 15 03/24/2018 10:45 AM    CREATININE 0.6 03/24/2018 10:45 AM    GLUCOSE 110 (H) 03/24/2018 10:45 AM     COAGS  No results found for: PROTIME, INR, APTT    Lillian Velázquez MD   5/23/2019

## 2019-05-23 NOTE — ANESTHESIA POSTPROCEDURE EVALUATION
Department of Anesthesiology  Postprocedure Note    Patient: Mikey Rubin  MRN: [id number]  YOB: 1950  Date of evaluation: 5/23/2019  Time:  5:26 PM     Procedure Summary     Date:  05/23/19 Room / Location:  [place] [address] / [facility] ENDOSCOPY    Anesthesia Start:  6214 Anesthesia Stop:  7703    Procedures:       UPPER EUS W/ANES. (11:30) (N/A )      EGD BIOPSY Diagnosis:  (PANCREATIC LESION)    Surgeon:  Tricia Hurst DO Responsible Provider:  Mehran Garcia MD    Anesthesia Type:  general ASA Status:  3          Anesthesia Type: general    Armand Phase I: Armand Score: 10    Armand Phase II: Armand Score: 10    Last vitals: Reviewed and per EMR flowsheets.        Anesthesia Post Evaluation    Comments: Postoperative Anesthesia Note    Name:    Mikey Rubin  MRN:      [id number]    Patient Vitals in the past 12 hrs:  05/23/19 1401, BP:(!) 147/71, Pulse:54, Resp:16, SpO2:93 %  05/23/19 1345, BP:(!) 146/87, Pulse:55, Resp:16, SpO2:96 %  05/23/19 1330, BP:126/88, Pulse:50, Resp:16, SpO2:100 %  05/23/19 1315, BP:(!) 171/66, Pulse:50, Resp:16, SpO2:99 %  05/23/19 1300, BP:(!) 150/79, Pulse:55, Resp:14, SpO2:99 %  05/23/19 1255, BP:(!) 153/75, Pulse:55, Resp:14, SpO2:97 %  05/23/19 1250, BP:(!) 152/66, Pulse:58, Resp:14, SpO2:94 %  05/23/19 1245, BP:(!) 167/82, Temp:97.6 °F (36.4 °C), Temp src:Temporal, Pulse:58, Resp:14, SpO2:96 %  05/23/19 1051, BP:(!) 156/68, Temp:98.2 °F (36.8 °C), Temp src:Temporal, Pulse:54, Resp:16, SpO2:97 %, Height:5' 2\" (1.575 m), Weight:175 lb (79.4 kg)     LABS:    CBC  Lab Results       Component                Value               Date/Time                  WBC                      7.0                 03/24/2018 10:45 AM        HGB                      13.3                03/24/2018 10:45 AM        HCT                      38.2                03/24/2018 10:45 AM        PLT                      193                 03/24/2018 10:45 AM   RENAL  Lab Results       Component Value               Date/Time                  NA                       140                 03/24/2018 10:45 AM        K                        4.4                 03/24/2018 10:45 AM        CL                       101                 03/24/2018 10:45 AM        CO2                      26                  03/24/2018 10:45 AM        BUN                      15                  03/24/2018 10:45 AM        CREATININE               0.6                 03/24/2018 10:45 AM        GLUCOSE                  110 (H)             03/24/2018 10:45 AM   COAGS  No results found for: PROTIME, INR, APTT    Intake & Output:  @24HRIO@    Nausea & Vomiting:  No    Level of Consciousness:  Awake    Pain Assessment:  Adequate analgesia    Anesthesia Complications:  No apparent anesthetic complications    SUMMARY      Vital signs stable  OK to discharge from Stage I post anesthesia care.   Care transferred from Anesthesiology department on discharge from perioperative area

## 2019-07-30 ENCOUNTER — OFFICE VISIT (OUTPATIENT)
Dept: ORTHOPEDIC SURGERY | Age: 69
End: 2019-07-30
Payer: MEDICARE

## 2019-07-30 DIAGNOSIS — M79.641 RIGHT HAND PAIN: Primary | ICD-10-CM

## 2019-07-30 DIAGNOSIS — R22.31 MASS OF RIGHT WRIST: ICD-10-CM

## 2019-07-30 PROCEDURE — 99213 OFFICE O/P EST LOW 20 MIN: CPT | Performed by: ORTHOPAEDIC SURGERY

## 2019-07-30 PROCEDURE — 1123F ACP DISCUSS/DSCN MKR DOCD: CPT | Performed by: ORTHOPAEDIC SURGERY

## 2019-07-30 PROCEDURE — 1036F TOBACCO NON-USER: CPT | Performed by: ORTHOPAEDIC SURGERY

## 2019-07-30 PROCEDURE — G8417 CALC BMI ABV UP PARAM F/U: HCPCS | Performed by: ORTHOPAEDIC SURGERY

## 2019-07-30 PROCEDURE — 4040F PNEUMOC VAC/ADMIN/RCVD: CPT | Performed by: ORTHOPAEDIC SURGERY

## 2019-07-30 PROCEDURE — G8427 DOCREV CUR MEDS BY ELIG CLIN: HCPCS | Performed by: ORTHOPAEDIC SURGERY

## 2019-07-30 PROCEDURE — G8400 PT W/DXA NO RESULTS DOC: HCPCS | Performed by: ORTHOPAEDIC SURGERY

## 2019-07-30 PROCEDURE — 3017F COLORECTAL CA SCREEN DOC REV: CPT | Performed by: ORTHOPAEDIC SURGERY

## 2019-07-30 PROCEDURE — 1090F PRES/ABSN URINE INCON ASSESS: CPT | Performed by: ORTHOPAEDIC SURGERY

## 2019-08-06 ENCOUNTER — HOSPITAL ENCOUNTER (OUTPATIENT)
Dept: MRI IMAGING | Age: 69
Discharge: HOME | End: 2019-08-06
Payer: MEDICARE

## 2019-08-06 DIAGNOSIS — M79.641 RIGHT HAND PAIN: ICD-10-CM

## 2019-08-06 PROCEDURE — 73221 MRI JOINT UPR EXTREM W/O DYE: CPT

## 2019-08-13 ENCOUNTER — OFFICE VISIT (OUTPATIENT)
Dept: ORTHOPEDIC SURGERY | Age: 69
End: 2019-08-13
Payer: MEDICARE

## 2019-08-13 VITALS — BODY MASS INDEX: 32.21 KG/M2 | WEIGHT: 175.04 LBS | HEIGHT: 62 IN

## 2019-08-13 DIAGNOSIS — R22.31 MASS OF RIGHT WRIST: Primary | ICD-10-CM

## 2019-08-13 PROCEDURE — 4040F PNEUMOC VAC/ADMIN/RCVD: CPT | Performed by: ORTHOPAEDIC SURGERY

## 2019-08-13 PROCEDURE — 1090F PRES/ABSN URINE INCON ASSESS: CPT | Performed by: ORTHOPAEDIC SURGERY

## 2019-08-13 PROCEDURE — G8427 DOCREV CUR MEDS BY ELIG CLIN: HCPCS | Performed by: ORTHOPAEDIC SURGERY

## 2019-08-13 PROCEDURE — 1123F ACP DISCUSS/DSCN MKR DOCD: CPT | Performed by: ORTHOPAEDIC SURGERY

## 2019-08-13 PROCEDURE — 1036F TOBACCO NON-USER: CPT | Performed by: ORTHOPAEDIC SURGERY

## 2019-08-13 PROCEDURE — 99213 OFFICE O/P EST LOW 20 MIN: CPT | Performed by: ORTHOPAEDIC SURGERY

## 2019-08-13 PROCEDURE — G8400 PT W/DXA NO RESULTS DOC: HCPCS | Performed by: ORTHOPAEDIC SURGERY

## 2019-08-13 PROCEDURE — G8417 CALC BMI ABV UP PARAM F/U: HCPCS | Performed by: ORTHOPAEDIC SURGERY

## 2019-08-13 PROCEDURE — 3017F COLORECTAL CA SCREEN DOC REV: CPT | Performed by: ORTHOPAEDIC SURGERY

## 2019-08-13 NOTE — PROGRESS NOTES
Chief Complaint   Patient presents with    Follow-up     TR MRI RIGHT HAND       HISTORY OF PRESENT ILLNESS:  Donya Cohn is a 76 y.o.  patient here for repeat evaluation after undergoing advanced imaging of the right wrist for evaluation of dorsal wrist mass. Mass is still present without any change. She denies any fevers or weight loss. She still has discomfort with direct pressure. ROS:  ROS neg     Past medical history, allergies, and medications are reviewed again today, no changes to report. PHYSICAL EXAMINATION:  Patient is alert and pleasant, in no acute distress. The affected extremity is again examined today. Right hand and wrist reveal a palpable masslike structure along the extensor tendons centrally, mass moves with the tendons. Firm, nonfluctuant. No skin discoloration. No pulsation. Good motion of the fingers without triggering.   No palpable lymphadenopathy in the medial arm    X-rays:  None today    Advanced imaging:    MRI of the right wrist is reviewed, impression:  EXAMINATION:   MRI OF THE RIGHT WRIST WITHOUT CONTRAST, 8/6/2019 9:29 am       TECHNIQUE:   Multiplanar multisequence MRI of the right wrist was performed without the   administration of intravenous contrast.       COMPARISON:   Radiographs dated 07/30/2019       HISTORY:   ORDERING SYSTEM PROVIDED HISTORY: Right hand pain   TECHNOLOGIST PROVIDED HISTORY:   Reason for exam:->right wrist eval dorsal wrist mass, r/o ganglion   Reason for Exam: right wrist eval dorsal wrist mass, r/o ganglion   Acuity: Acute   Type of Exam: Initial   Additional signs and symptoms: repeats, and coaching to limit motion artifact   utilized with no success   Relevant Medical/Surgical History: right hand pain since May 2019       FINDINGS:   INTRINSIC/EXTRINSIC LIGAMENTS: Scapholunate and lunotriquetral ligaments are   intact.       TFCC: Motion limits the evaluation.  A definitive TFCC tear is not identified.       EXTENSOR TENDONS: There is sheath. Treatment options are reviewed again with the patient. Recommendation made for excision at a convenient time for her to assure diagnosis, remove the mass and prevent any chance of metastasis. Outpatient procedure under light sedation and local.  Surgical procedure along with time to recovery along with recurrence risk were outlined. She would like to proceed. All questions and concerns were addressed today. Patient is in agreement with the plan.         Fareed Hernandez MD  Hand & Upper Extremity Surgery  [address] [facility])

## 2019-08-14 ENCOUNTER — TELEPHONE (OUTPATIENT)
Dept: ORTHOPEDIC SURGERY | Age: 69
End: 2019-08-14

## 2019-08-28 ENCOUNTER — ANESTHESIA EVENT (OUTPATIENT)
Dept: OPERATING ROOM | Age: 69
End: 2019-08-28
Payer: MEDICARE

## 2019-08-28 ASSESSMENT — LIFESTYLE VARIABLES: SMOKING_STATUS: 0

## 2019-08-28 NOTE — ANESTHESIA PRE PROCEDURE
05/23/19 (!) 209/110       NPO Status:  mn+, see mar for am meds                                                                               BMI:   Wt Readings from Last 3 Encounters:   08/29/19 170 lb (77.1 kg)   08/13/19 175 lb 0.7 oz (79.4 kg)   05/23/19 175 lb (79.4 kg)     Body mass index is 31.09 kg/m². CBC:   Lab Results   Component Value Date    WBC 7.0 03/24/2018    RBC 4.07 03/24/2018    HGB 13.3 03/24/2018    HCT 38.2 03/24/2018    MCV 93.8 03/24/2018    RDW 13.1 03/24/2018     03/24/2018       CMP:   Lab Results   Component Value Date     03/24/2018    K 4.4 03/24/2018     03/24/2018    CO2 26 03/24/2018    BUN 15 03/24/2018    CREATININE 0.6 03/24/2018    GFRAA >60 03/24/2018    AGRATIO 1.5 03/24/2018    LABGLOM >60 03/24/2018    GLUCOSE 110 03/24/2018    PROT 6.6 03/24/2018    PROT 7.5 02/23/2011    CALCIUM 8.9 03/24/2018    BILITOT 0.6 03/24/2018    ALKPHOS 46 03/24/2018    AST 23 03/24/2018    ALT 27 03/24/2018       POC Tests: No results for input(s): POCGLU, POCNA, POCK, POCCL, POCBUN, POCHEMO, POCHCT in the last 72 hours. Coags: No results found for: PROTIME, INR, APTT    HCG (If Applicable): No results found for: PREGTESTUR, PREGSERUM, HCG, HCGQUANT     ABGs: No results found for: PHART, PO2ART, KRP1ZBT, NOC4APX, BEART, U7EGDCCJ     Type & Screen (If Applicable):  No results found for: BIJAL [facility]    Anesthesia Evaluation  Patient summary reviewed   history of anesthetic complications (ASPIRATION PNA, 20 yrs ago ): PONV. Airway: Mallampati: II  TM distance: >3 FB   Neck ROM: full  Mouth opening: > = 3 FB Dental:          Pulmonary: breath sounds clear to auscultation      (-) COPD, asthma, sleep apnea and not a current smoker          Patient did not smoke on day of surgery.                  Cardiovascular:    (+) hypertension:, hyperlipidemia    (-) pacemaker, past MI, CAD, CABG/stent, dysrhythmias,  angina and  CHF      Rhythm: regular  Rate: normal    Stress test

## 2019-08-29 ENCOUNTER — ANESTHESIA (OUTPATIENT)
Dept: OPERATING ROOM | Age: 69
End: 2019-08-29
Payer: MEDICARE

## 2019-08-29 ENCOUNTER — HOSPITAL ENCOUNTER (OUTPATIENT)
Age: 69
Setting detail: OUTPATIENT SURGERY
Discharge: HOME | End: 2019-08-29
Attending: ORTHOPAEDIC SURGERY | Admitting: ORTHOPAEDIC SURGERY
Payer: MEDICARE

## 2019-08-29 VITALS
SYSTOLIC BLOOD PRESSURE: 139 MMHG | WEIGHT: 170 LBS | TEMPERATURE: 98 F | RESPIRATION RATE: 16 BRPM | DIASTOLIC BLOOD PRESSURE: 74 MMHG | HEIGHT: 62 IN | BODY MASS INDEX: 31.28 KG/M2 | HEART RATE: 68 BPM | OXYGEN SATURATION: 97 %

## 2019-08-29 VITALS
RESPIRATION RATE: 1 BRPM | OXYGEN SATURATION: 96 % | DIASTOLIC BLOOD PRESSURE: 68 MMHG | SYSTOLIC BLOOD PRESSURE: 136 MMHG

## 2019-08-29 DIAGNOSIS — R22.31 MASS OF HAND, RIGHT: Primary | ICD-10-CM

## 2019-08-29 PROCEDURE — 7100000010 HC PHASE II RECOVERY - FIRST 15 MIN: Performed by: ORTHOPAEDIC SURGERY

## 2019-08-29 PROCEDURE — 7100000011 HC PHASE II RECOVERY - ADDTL 15 MIN: Performed by: ORTHOPAEDIC SURGERY

## 2019-08-29 PROCEDURE — 88304 TISSUE EXAM BY PATHOLOGIST: CPT

## 2019-08-29 PROCEDURE — 7100000001 HC PACU RECOVERY - ADDTL 15 MIN: Performed by: ORTHOPAEDIC SURGERY

## 2019-08-29 PROCEDURE — 3700000001 HC ADD 15 MINUTES (ANESTHESIA): Performed by: ORTHOPAEDIC SURGERY

## 2019-08-29 PROCEDURE — 7100000000 HC PACU RECOVERY - FIRST 15 MIN: Performed by: ORTHOPAEDIC SURGERY

## 2019-08-29 PROCEDURE — 2709999900 HC NON-CHARGEABLE SUPPLY: Performed by: ORTHOPAEDIC SURGERY

## 2019-08-29 PROCEDURE — 2500000003 HC RX 250 WO HCPCS: Performed by: ANESTHESIOLOGY

## 2019-08-29 PROCEDURE — 2500000003 HC RX 250 WO HCPCS: Performed by: NURSE ANESTHETIST, CERTIFIED REGISTERED

## 2019-08-29 PROCEDURE — 3600000012 HC SURGERY LEVEL 2 ADDTL 15MIN: Performed by: ORTHOPAEDIC SURGERY

## 2019-08-29 PROCEDURE — 2580000003 HC RX 258: Performed by: ANESTHESIOLOGY

## 2019-08-29 PROCEDURE — 2500000003 HC RX 250 WO HCPCS: Performed by: ORTHOPAEDIC SURGERY

## 2019-08-29 PROCEDURE — 3600000002 HC SURGERY LEVEL 2 BASE: Performed by: ORTHOPAEDIC SURGERY

## 2019-08-29 PROCEDURE — 6360000002 HC RX W HCPCS: Performed by: NURSE ANESTHETIST, CERTIFIED REGISTERED

## 2019-08-29 PROCEDURE — 6360000002 HC RX W HCPCS: Performed by: ANESTHESIOLOGY

## 2019-08-29 PROCEDURE — 3700000000 HC ANESTHESIA ATTENDED CARE: Performed by: ORTHOPAEDIC SURGERY

## 2019-08-29 RX ORDER — SODIUM CHLORIDE 0.9 % (FLUSH) 0.9 %
10 SYRINGE (ML) INJECTION PRN
Status: DISCONTINUED | OUTPATIENT
Start: 2019-08-29 | End: 2019-08-29 | Stop reason: HOSPADM

## 2019-08-29 RX ORDER — SODIUM CHLORIDE, SODIUM LACTATE, POTASSIUM CHLORIDE, CALCIUM CHLORIDE 600; 310; 30; 20 MG/100ML; MG/100ML; MG/100ML; MG/100ML
INJECTION, SOLUTION INTRAVENOUS CONTINUOUS
Status: DISCONTINUED | OUTPATIENT
Start: 2019-08-29 | End: 2019-08-29 | Stop reason: HOSPADM

## 2019-08-29 RX ORDER — METOCLOPRAMIDE HYDROCHLORIDE 5 MG/ML
INJECTION INTRAMUSCULAR; INTRAVENOUS PRN
Status: DISCONTINUED | OUTPATIENT
Start: 2019-08-29 | End: 2019-08-29 | Stop reason: SDUPTHER

## 2019-08-29 RX ORDER — SODIUM CHLORIDE 0.9 % (FLUSH) 0.9 %
10 SYRINGE (ML) INJECTION EVERY 12 HOURS SCHEDULED
Status: DISCONTINUED | OUTPATIENT
Start: 2019-08-29 | End: 2019-08-29 | Stop reason: HOSPADM

## 2019-08-29 RX ORDER — MORPHINE SULFATE 10 MG/ML
2 INJECTION, SOLUTION INTRAMUSCULAR; INTRAVENOUS EVERY 5 MIN PRN
Status: DISCONTINUED | OUTPATIENT
Start: 2019-08-29 | End: 2019-08-29 | Stop reason: HOSPADM

## 2019-08-29 RX ORDER — LIDOCAINE HYDROCHLORIDE 20 MG/ML
INJECTION, SOLUTION INFILTRATION; PERINEURAL PRN
Status: DISCONTINUED | OUTPATIENT
Start: 2019-08-29 | End: 2019-08-29 | Stop reason: SDUPTHER

## 2019-08-29 RX ORDER — PROPOFOL 10 MG/ML
INJECTION, EMULSION INTRAVENOUS PRN
Status: DISCONTINUED | OUTPATIENT
Start: 2019-08-29 | End: 2019-08-29 | Stop reason: SDUPTHER

## 2019-08-29 RX ORDER — FENTANYL CITRATE 50 UG/ML
INJECTION, SOLUTION INTRAMUSCULAR; INTRAVENOUS PRN
Status: DISCONTINUED | OUTPATIENT
Start: 2019-08-29 | End: 2019-08-29 | Stop reason: SDUPTHER

## 2019-08-29 RX ORDER — FENTANYL CITRATE 50 UG/ML
25 INJECTION, SOLUTION INTRAMUSCULAR; INTRAVENOUS EVERY 5 MIN PRN
Status: DISCONTINUED | OUTPATIENT
Start: 2019-08-29 | End: 2019-08-29 | Stop reason: HOSPADM

## 2019-08-29 RX ORDER — HYDROCODONE BITARTRATE AND ACETAMINOPHEN 5; 325 MG/1; MG/1
1 TABLET ORAL EVERY 8 HOURS PRN
Qty: 15 TABLET | Refills: 0 | Status: SHIPPED | OUTPATIENT
Start: 2019-08-29 | End: 2019-09-03

## 2019-08-29 RX ORDER — OXYCODONE HYDROCHLORIDE AND ACETAMINOPHEN 5; 325 MG/1; MG/1
2 TABLET ORAL PRN
Status: DISCONTINUED | OUTPATIENT
Start: 2019-08-29 | End: 2019-08-29 | Stop reason: HOSPADM

## 2019-08-29 RX ORDER — SUCCINYLCHOLINE CHLORIDE 20 MG/ML
INJECTION INTRAMUSCULAR; INTRAVENOUS PRN
Status: DISCONTINUED | OUTPATIENT
Start: 2019-08-29 | End: 2019-08-29 | Stop reason: SDUPTHER

## 2019-08-29 RX ORDER — ONDANSETRON 2 MG/ML
4 INJECTION INTRAMUSCULAR; INTRAVENOUS
Status: COMPLETED | OUTPATIENT
Start: 2019-08-29 | End: 2019-08-29

## 2019-08-29 RX ORDER — LABETALOL HYDROCHLORIDE 5 MG/ML
INJECTION, SOLUTION INTRAVENOUS PRN
Status: DISCONTINUED | OUTPATIENT
Start: 2019-08-29 | End: 2019-08-29 | Stop reason: SDUPTHER

## 2019-08-29 RX ORDER — DEXAMETHASONE SODIUM PHOSPHATE 4 MG/ML
INJECTION, SOLUTION INTRA-ARTICULAR; INTRALESIONAL; INTRAMUSCULAR; INTRAVENOUS; SOFT TISSUE PRN
Status: DISCONTINUED | OUTPATIENT
Start: 2019-08-29 | End: 2019-08-29 | Stop reason: SDUPTHER

## 2019-08-29 RX ORDER — OXYCODONE HYDROCHLORIDE AND ACETAMINOPHEN 5; 325 MG/1; MG/1
1 TABLET ORAL PRN
Status: DISCONTINUED | OUTPATIENT
Start: 2019-08-29 | End: 2019-08-29 | Stop reason: HOSPADM

## 2019-08-29 RX ORDER — EPHEDRINE SULFATE 50 MG/ML
INJECTION INTRAVENOUS PRN
Status: DISCONTINUED | OUTPATIENT
Start: 2019-08-29 | End: 2019-08-29 | Stop reason: SDUPTHER

## 2019-08-29 RX ORDER — FENTANYL CITRATE 50 UG/ML
50 INJECTION, SOLUTION INTRAMUSCULAR; INTRAVENOUS EVERY 5 MIN PRN
Status: DISCONTINUED | OUTPATIENT
Start: 2019-08-29 | End: 2019-08-29 | Stop reason: HOSPADM

## 2019-08-29 RX ORDER — MORPHINE SULFATE 10 MG/ML
1 INJECTION, SOLUTION INTRAMUSCULAR; INTRAVENOUS EVERY 5 MIN PRN
Status: DISCONTINUED | OUTPATIENT
Start: 2019-08-29 | End: 2019-08-29 | Stop reason: HOSPADM

## 2019-08-29 RX ORDER — MEPERIDINE HYDROCHLORIDE 25 MG/ML
12.5 INJECTION INTRAMUSCULAR; INTRAVENOUS; SUBCUTANEOUS EVERY 5 MIN PRN
Status: DISCONTINUED | OUTPATIENT
Start: 2019-08-29 | End: 2019-08-29 | Stop reason: HOSPADM

## 2019-08-29 RX ADMIN — PHENYLEPHRINE HYDROCHLORIDE 40 MCG: 10 INJECTION INTRAVENOUS at 10:31

## 2019-08-29 RX ADMIN — FAMOTIDINE 20 MG: 10 INJECTION, SOLUTION INTRAVENOUS at 08:50

## 2019-08-29 RX ADMIN — PHENYLEPHRINE HYDROCHLORIDE 40 MCG: 10 INJECTION INTRAVENOUS at 10:32

## 2019-08-29 RX ADMIN — ONDANSETRON 4 MG: 2 INJECTION, SOLUTION INTRAMUSCULAR; INTRAVENOUS at 09:54

## 2019-08-29 RX ADMIN — PROPOFOL 150 MG: 10 INJECTION, EMULSION INTRAVENOUS at 09:58

## 2019-08-29 RX ADMIN — LIDOCAINE HYDROCHLORIDE 0.1 ML: 10 INJECTION, SOLUTION EPIDURAL; INFILTRATION; INTRACAUDAL; PERINEURAL at 08:49

## 2019-08-29 RX ADMIN — SODIUM CHLORIDE, POTASSIUM CHLORIDE, SODIUM LACTATE AND CALCIUM CHLORIDE: 600; 310; 30; 20 INJECTION, SOLUTION INTRAVENOUS at 08:50

## 2019-08-29 RX ADMIN — LABETALOL HYDROCHLORIDE 5 MG: 5 INJECTION, SOLUTION INTRAVENOUS at 10:10

## 2019-08-29 RX ADMIN — DEXAMETHASONE SODIUM PHOSPHATE 8 MG: 4 INJECTION, SOLUTION INTRAMUSCULAR; INTRAVENOUS at 10:04

## 2019-08-29 RX ADMIN — LIDOCAINE HYDROCHLORIDE 60 MG: 20 INJECTION, SOLUTION INFILTRATION; PERINEURAL at 09:58

## 2019-08-29 RX ADMIN — SUCCINYLCHOLINE CHLORIDE 120 MG: 20 INJECTION, SOLUTION INTRAMUSCULAR; INTRAVENOUS at 09:58

## 2019-08-29 RX ADMIN — METOCLOPRAMIDE 10 MG: 5 INJECTION, SOLUTION INTRAMUSCULAR; INTRAVENOUS at 10:12

## 2019-08-29 RX ADMIN — EPHEDRINE SULFATE 5 MG: 50 INJECTION INTRAVENOUS at 10:33

## 2019-08-29 RX ADMIN — FENTANYL CITRATE 50 MCG: 50 INJECTION INTRAMUSCULAR; INTRAVENOUS at 09:54

## 2019-08-29 RX ADMIN — SODIUM CHLORIDE, POTASSIUM CHLORIDE, SODIUM LACTATE AND CALCIUM CHLORIDE: 600; 310; 30; 20 INJECTION, SOLUTION INTRAVENOUS at 09:54

## 2019-08-29 ASSESSMENT — PULMONARY FUNCTION TESTS
PIF_VALUE: 2
PIF_VALUE: 17
PIF_VALUE: 22
PIF_VALUE: 0
PIF_VALUE: 2
PIF_VALUE: 13
PIF_VALUE: 20
PIF_VALUE: 1
PIF_VALUE: 12
PIF_VALUE: 3
PIF_VALUE: 31
PIF_VALUE: 3
PIF_VALUE: 3
PIF_VALUE: 12
PIF_VALUE: 20
PIF_VALUE: 33
PIF_VALUE: 5
PIF_VALUE: 12
PIF_VALUE: 13
PIF_VALUE: 12
PIF_VALUE: 0
PIF_VALUE: 3
PIF_VALUE: 14
PIF_VALUE: 2
PIF_VALUE: 19
PIF_VALUE: 14
PIF_VALUE: 19
PIF_VALUE: 21
PIF_VALUE: 0
PIF_VALUE: 25
PIF_VALUE: 25
PIF_VALUE: 20
PIF_VALUE: 24
PIF_VALUE: 4
PIF_VALUE: 3
PIF_VALUE: 1
PIF_VALUE: 18
PIF_VALUE: 1
PIF_VALUE: 20
PIF_VALUE: 1
PIF_VALUE: 14
PIF_VALUE: 1
PIF_VALUE: 0
PIF_VALUE: 19
PIF_VALUE: 13
PIF_VALUE: 21
PIF_VALUE: 10
PIF_VALUE: 24
PIF_VALUE: 30
PIF_VALUE: 1
PIF_VALUE: 15
PIF_VALUE: 0
PIF_VALUE: 25
PIF_VALUE: 19
PIF_VALUE: 13
PIF_VALUE: 24
PIF_VALUE: 0
PIF_VALUE: 12
PIF_VALUE: 19
PIF_VALUE: 1
PIF_VALUE: 24
PIF_VALUE: 19

## 2019-08-29 ASSESSMENT — PAIN DESCRIPTION - DESCRIPTORS: DESCRIPTORS: ACHING

## 2019-08-29 ASSESSMENT — PAIN SCALES - GENERAL
PAINLEVEL_OUTOF10: 0
PAINLEVEL_OUTOF10: 0

## 2019-08-29 ASSESSMENT — PAIN - FUNCTIONAL ASSESSMENT
PAIN_FUNCTIONAL_ASSESSMENT: ACTIVITIES ARE NOT PREVENTED
PAIN_FUNCTIONAL_ASSESSMENT: 0-10

## 2019-08-29 NOTE — ANESTHESIA POSTPROCEDURE EVALUATION
Department of Anesthesiology  Postprocedure Note    Patient: Jason Duque  MRN: [id number]  YOB: 1950  Date of evaluation: 8/29/2019  Time:  11:20 AM     Procedure Summary     Date:  08/29/19 Room / Location:  [facility] EG OR 01 / [facility]    Anesthesia Start:  4945 Anesthesia Stop:  7981    Procedure:  EXCISE MASS RIGHT HAND (Right Hand) Diagnosis:  (MASS OF RIGHT HAND)    Surgeon:  Refugio Salas MD Responsible Provider:  Carlee Alcantar MD    Anesthesia Type:  TIVA, general ASA Status:  2          Anesthesia Type: TIVA, general    Armand Phase I: Armand Score: 9    Armand Phase II:      Last vitals: Reviewed and per EMR flowsheets.    Vitals:    08/29/19 1058 08/29/19 1103 08/29/19 1108 08/29/19 1120   BP: 132/79 137/76 135/81 139/74   Pulse: 74 69 66 68   Resp: 16 15 16 16   Temp:    98 °F (36.7 °C)   TempSrc:    Temporal   SpO2: 95% 95% 96% 97%   Weight:       Height:           Anesthesia Post Evaluation    Patient location during evaluation: bedside  Patient participation: complete - patient participated  Level of consciousness: awake and alert  Airway patency: patent  Nausea & Vomiting: no nausea  Complications: no  Cardiovascular status: hemodynamically stable  Respiratory status: acceptable  Hydration status: euvolemic

## 2019-08-29 NOTE — BRIEF OP NOTE
Brief Postoperative Note  ______________________________________________________________    Patient: Chica Chua  YOB: 1950  MRN: [id number]  Date of Procedure: 8/29/2019    Pre-Op Diagnosis: MASS OF RIGHT HAND    Post-Op Diagnosis: Same       Procedure(s):  EXCISE MASS RIGHT HAND    Anesthesia: General, TIVA    Surgeon(s):  Penny Dover MD    Assistant: none    Estimated Blood Loss (mL): less than 50     Complications: None    Specimens:   ID Type Source Tests Collected by Time Destination   A :  Tissue Tissue SURGICAL PATHOLOGY Penny Dover MD 8/29/2019 1022        Implants:  * No implants in log *      Drains:   NG/OG/NJ/NE Tube Orogastric (Active)       Findings:     Penny Dover MD  Date: 8/29/2019  Time: 10:31 AM

## 2019-08-30 NOTE — OP NOTE
over the right wrist and  over the right mass, through skin only. Full-thickness skin flaps with  meticulous hemostasis while protecting the dorsal sensory nerve  branches. The extensor tendons were identified and isolated. There was  an enlargement of the extensor tendon to the middle finger over the  dorsum of the wrist and hand, consistent with an intratendinous mass or  growth. The tendon was incised longitudinally to gain access to the  mass. The mass appeared to have a clear gelatinous fluid, consistent  more with a ganglion rather than a giant cell tumor of tendon sheath at  least grossly. There was infiltration of the tendon, rather than a  discrete mass that could be excised en bloc. Therefore, a small  segments for fibers of the extensor tendon were excised as part of the  mass to remove the entire mass. After the mass was removed, it was sent  as pathologic specimen. It did appear to be benign. The frayed edges  of the tendon remaining were trimmed to a smooth contour. The tendon  was overall intact longitudinally. It had been opened longitudinally  and clamshell opened. This tendon was now closed back down and a Vicryl  suture was placed to encourage healing. Passive motion of the digits  revealed good motion of the extensor tendons. There was some  inflammatory tissue along the extensor tendons within the fourth dorsal  compartment, and therefore, a tenosynovectomy was performed to prevent  postoperative catching of the tendons or pain. Wound was copiously  irrigated. No other abnormalities were noted. Skin was now closed with  inverted Vicryl followed by Monocryl on the skin and Steri-Strips. Tourniquet had been deflated to assure meticulous hemostasis. Local  anesthetic had been placed. Soft sterile dressings were placed. The  patient was awoken from general anesthesia, tolerated the case well, and  was taken to postanesthesia recovery in good condition.

## 2019-09-10 ENCOUNTER — OFFICE VISIT (OUTPATIENT)
Dept: ORTHOPEDIC SURGERY | Age: 69
End: 2019-09-10

## 2019-09-10 VITALS — WEIGHT: 169.97 LBS | BODY MASS INDEX: 31.28 KG/M2 | HEIGHT: 62 IN

## 2019-09-10 DIAGNOSIS — R22.31 MASS OF RIGHT WRIST: Primary | ICD-10-CM

## 2019-09-10 PROCEDURE — 99024 POSTOP FOLLOW-UP VISIT: CPT | Performed by: ORTHOPAEDIC SURGERY

## 2019-10-22 ENCOUNTER — OFFICE VISIT (OUTPATIENT)
Dept: ORTHOPEDIC SURGERY | Age: 69
End: 2019-10-22

## 2019-10-22 VITALS — BODY MASS INDEX: 31.28 KG/M2 | WEIGHT: 169.97 LBS | HEIGHT: 62 IN

## 2019-10-22 DIAGNOSIS — R22.31 MASS OF RIGHT WRIST: Primary | ICD-10-CM

## 2019-10-22 PROCEDURE — 99024 POSTOP FOLLOW-UP VISIT: CPT | Performed by: ORTHOPAEDIC SURGERY

## 2019-11-19 ENCOUNTER — OFFICE VISIT (OUTPATIENT)
Dept: ORTHOPEDIC SURGERY | Age: 69
End: 2019-11-19
Payer: MEDICARE

## 2019-11-19 VITALS — BODY MASS INDEX: 31.28 KG/M2 | WEIGHT: 169.97 LBS | HEIGHT: 62 IN

## 2019-11-19 DIAGNOSIS — M25.561 RIGHT KNEE PAIN, UNSPECIFIED CHRONICITY: ICD-10-CM

## 2019-11-19 DIAGNOSIS — M17.11 PRIMARY OSTEOARTHRITIS OF RIGHT KNEE: Primary | ICD-10-CM

## 2019-11-19 PROCEDURE — G8484 FLU IMMUNIZE NO ADMIN: HCPCS | Performed by: PHYSICIAN ASSISTANT

## 2019-11-19 PROCEDURE — 3017F COLORECTAL CA SCREEN DOC REV: CPT | Performed by: PHYSICIAN ASSISTANT

## 2019-11-19 PROCEDURE — G8417 CALC BMI ABV UP PARAM F/U: HCPCS | Performed by: PHYSICIAN ASSISTANT

## 2019-11-19 PROCEDURE — G8400 PT W/DXA NO RESULTS DOC: HCPCS | Performed by: PHYSICIAN ASSISTANT

## 2019-11-19 PROCEDURE — 1123F ACP DISCUSS/DSCN MKR DOCD: CPT | Performed by: PHYSICIAN ASSISTANT

## 2019-11-19 PROCEDURE — 4040F PNEUMOC VAC/ADMIN/RCVD: CPT | Performed by: PHYSICIAN ASSISTANT

## 2019-11-19 PROCEDURE — 1090F PRES/ABSN URINE INCON ASSESS: CPT | Performed by: PHYSICIAN ASSISTANT

## 2019-11-19 PROCEDURE — G8427 DOCREV CUR MEDS BY ELIG CLIN: HCPCS | Performed by: PHYSICIAN ASSISTANT

## 2019-11-19 PROCEDURE — 1036F TOBACCO NON-USER: CPT | Performed by: PHYSICIAN ASSISTANT

## 2019-11-19 PROCEDURE — 99214 OFFICE O/P EST MOD 30 MIN: CPT | Performed by: PHYSICIAN ASSISTANT

## 2019-11-19 RX ORDER — METHYLPREDNISOLONE ACETATE 40 MG/ML
80 INJECTION, SUSPENSION INTRA-ARTICULAR; INTRALESIONAL; INTRAMUSCULAR; SOFT TISSUE ONCE
Status: COMPLETED | OUTPATIENT
Start: 2019-11-19 | End: 2019-11-19

## 2019-11-19 RX ADMIN — METHYLPREDNISOLONE ACETATE 80 MG: 40 INJECTION, SUSPENSION INTRA-ARTICULAR; INTRALESIONAL; INTRAMUSCULAR; SOFT TISSUE at 12:50

## 2019-12-24 ENCOUNTER — NURSE ONLY (OUTPATIENT)
Dept: ORTHOPEDIC SURGERY | Age: 69
End: 2019-12-24
Payer: MEDICARE

## 2019-12-24 DIAGNOSIS — M17.11 PRIMARY OSTEOARTHRITIS OF RIGHT KNEE: Primary | ICD-10-CM

## 2019-12-24 RX ORDER — HYALURONATE SODIUM 10 MG/ML
20 SYRINGE (ML) INTRAARTICULAR ONCE
Status: COMPLETED | OUTPATIENT
Start: 2019-12-24 | End: 2019-12-24

## 2019-12-24 RX ADMIN — Medication 20 MG: at 10:54

## 2019-12-30 ENCOUNTER — NURSE ONLY (OUTPATIENT)
Dept: ORTHOPEDIC SURGERY | Age: 69
End: 2019-12-30
Payer: MEDICARE

## 2019-12-30 DIAGNOSIS — M17.11 PRIMARY OSTEOARTHRITIS OF RIGHT KNEE: Primary | ICD-10-CM

## 2019-12-30 RX ORDER — HYALURONATE SODIUM 10 MG/ML
20 SYRINGE (ML) INTRAARTICULAR ONCE
Status: COMPLETED | OUTPATIENT
Start: 2019-12-30 | End: 2019-12-30

## 2019-12-30 RX ADMIN — Medication 20 MG: at 09:21

## 2020-01-09 ENCOUNTER — NURSE ONLY (OUTPATIENT)
Dept: ORTHOPEDIC SURGERY | Age: 70
End: 2020-01-09
Payer: MEDICARE

## 2020-01-09 RX ORDER — HYALURONATE SODIUM 10 MG/ML
20 SYRINGE (ML) INTRAARTICULAR ONCE
Status: COMPLETED | OUTPATIENT
Start: 2020-01-09 | End: 2020-01-09

## 2020-01-09 RX ADMIN — Medication 20 MG: at 14:15

## 2020-01-09 NOTE — PROGRESS NOTES
Administrations This Visit     sodium hyaluronate (viscosup) injection 20 mg     Admin Date  01/09/2020  14:15 Action  Given Dose  20 mg Route  Intra-articular Site  Knee Right Administered By  [facility]    Ordering Provider:  TERRY Esteves    NDC:  [address]-7    Lot#:  [id number]    :  Neoma Basil    Patient Supplied?:  No

## 2020-01-09 NOTE — PROGRESS NOTES
Euflexxa #3 RIGHT knee  Osteoarthritis rate knee    The patient returns today for their third and final RIGHT knee Visco supplementation injection. The risks, benefits, and complications of the injections were again discussed in detail with the patient. The risks discussed include but are not limited to infection, skin reactions, hot swollen joints, and anaphylaxis. The patient gave verbal informed consent for the injection. The patient's skin was prepped with 3 sterile gauze pads soaked with alcohol solution and the knee joint was injected with 2cc Euflexxa RIGHT intra-articularly under sterile conditions. Technique: Under sterile conditions a SonFitwall ultrasound unit with a variable frequency (6.0-15.0 MHz) linear transducer was used to localize the placement of a 22-gauge needle into the RIGHT knee joint. Findings: Successful needle placement for intra-articular Visco supplementation injection. Final images were taken and saved for permanent record. The patient tolerated the injection reasonably well. The patient was given instructions to ice of the knee and avoid strenuous activity for 24-48 hours. The patient was instructed to call the office immediately if there is increased pain, redness, warmth, fever, or chills. We will see the patient back on an as-needed basis  from this point.

## 2020-03-16 ENCOUNTER — OFFICE VISIT (OUTPATIENT)
Dept: ORTHOPEDIC SURGERY | Age: 70
End: 2020-03-16
Payer: MEDICARE

## 2020-03-16 VITALS — HEIGHT: 62 IN | BODY MASS INDEX: 31.28 KG/M2 | WEIGHT: 169.97 LBS

## 2020-03-16 PROCEDURE — 1123F ACP DISCUSS/DSCN MKR DOCD: CPT | Performed by: PHYSICIAN ASSISTANT

## 2020-03-16 PROCEDURE — G8484 FLU IMMUNIZE NO ADMIN: HCPCS | Performed by: PHYSICIAN ASSISTANT

## 2020-03-16 PROCEDURE — 99213 OFFICE O/P EST LOW 20 MIN: CPT | Performed by: PHYSICIAN ASSISTANT

## 2020-03-16 PROCEDURE — 1090F PRES/ABSN URINE INCON ASSESS: CPT | Performed by: PHYSICIAN ASSISTANT

## 2020-03-16 PROCEDURE — G8417 CALC BMI ABV UP PARAM F/U: HCPCS | Performed by: PHYSICIAN ASSISTANT

## 2020-03-16 PROCEDURE — G8400 PT W/DXA NO RESULTS DOC: HCPCS | Performed by: PHYSICIAN ASSISTANT

## 2020-03-16 PROCEDURE — 1036F TOBACCO NON-USER: CPT | Performed by: PHYSICIAN ASSISTANT

## 2020-03-16 PROCEDURE — 4040F PNEUMOC VAC/ADMIN/RCVD: CPT | Performed by: PHYSICIAN ASSISTANT

## 2020-03-16 PROCEDURE — 3017F COLORECTAL CA SCREEN DOC REV: CPT | Performed by: PHYSICIAN ASSISTANT

## 2020-03-16 PROCEDURE — G8427 DOCREV CUR MEDS BY ELIG CLIN: HCPCS | Performed by: PHYSICIAN ASSISTANT

## 2020-03-16 RX ORDER — LIDOCAINE HYDROCHLORIDE 10 MG/ML
20 INJECTION, SOLUTION INFILTRATION; PERINEURAL ONCE
Status: COMPLETED | OUTPATIENT
Start: 2020-03-16 | End: 2020-03-16

## 2020-03-16 RX ORDER — BUPIVACAINE HYDROCHLORIDE 2.5 MG/ML
30 INJECTION, SOLUTION INFILTRATION; PERINEURAL ONCE
Status: COMPLETED | OUTPATIENT
Start: 2020-03-16 | End: 2020-03-16

## 2020-03-16 RX ORDER — METHYLPREDNISOLONE ACETATE 40 MG/ML
80 INJECTION, SUSPENSION INTRA-ARTICULAR; INTRALESIONAL; INTRAMUSCULAR; SOFT TISSUE ONCE
Status: COMPLETED | OUTPATIENT
Start: 2020-03-16 | End: 2020-03-16

## 2020-03-16 RX ADMIN — METHYLPREDNISOLONE ACETATE 80 MG: 40 INJECTION, SUSPENSION INTRA-ARTICULAR; INTRALESIONAL; INTRAMUSCULAR; SOFT TISSUE at 16:15

## 2020-03-16 RX ADMIN — BUPIVACAINE HYDROCHLORIDE 75 MG: 2.5 INJECTION, SOLUTION INFILTRATION; PERINEURAL at 16:15

## 2020-03-16 RX ADMIN — LIDOCAINE HYDROCHLORIDE 20 ML: 10 INJECTION, SOLUTION INFILTRATION; PERINEURAL at 16:16

## 2020-03-16 NOTE — PROGRESS NOTES
Hyperlipidemia     Hypertension     Kidney stones     Localized osteoarthrosis not specified whether primary or secondary, lower leg 10/2/2014    Ocular migraine     PONV (postoperative nausea and vomiting)     developed aspiration pneumonia    Reflux     Wears glasses       Past Surgical History:     Past Surgical History:   Procedure Laterality Date    ACHILLES TENDON SURGERY Right 01/19/2018    BACK SURGERY      steroid injections    BUNIONECTOMY Right 11/14/13    SILVER BUNIONECTOMY RIGHT FOOT; ARTHODESIS RIGHT SECOND DIGIT; EXOSTECTOMY RIGHT HALLUX; CAPSULOTOMY AND TENOTOMY RIGHT SECOND METATARSOPHANLANGEAL JOINT    CARPAL TUNNEL RELEASE Bilateral     CHOLECYSTECTOMY      FOOT SURGERY Left 06/28/2018    HAND SURGERY Right     HAND SURGERY Right 8/29/2019    EXCISE MASS RIGHT HAND performed by Penny Nam MD at [address] [place] [address] ARTHROSCOPY Left 2014    KNEE ARTHROSCOPY Right     LITHOTRIPSY      ROTATOR CUFF REPAIR      left    SHOULDER ARTHROSCOPY  8/29/12    with rotator cuff repair on right    SIGMOIDOSCOPY      TONSILLECTOMY      TUBAL LIGATION      UPPER GASTROINTESTINAL ENDOSCOPY N/A 5/23/2019    UPPER EUS W/ANES.  (11:30) performed by Pranay Loera DO at [place] [address]  5/23/2019    EGD BIOPSY performed by Pranay Loera DO at [facility]     Current Medications:     Current Outpatient Medications:     vitamin E 400 UNIT capsule, Take 400 Units by mouth 2 times daily, Disp: , Rfl:     citalopram (CELEXA) 20 MG tablet, Take 20 mg by mouth daily , Disp: , Rfl:     diclofenac sodium 1 % GEL, Apply 4 g topically as needed , Disp: , Rfl:     hydrALAZINE (APRESOLINE) 10 MG tablet, Take 10 mg by mouth, Disp: , Rfl:     losartan (COZAAR) 100 MG tablet, Take 100 mg by mouth daily , Disp: , Rfl:     metoprolol succinate (TOPROL XL) 50 MG extended release tablet, Take 50 mg by mouth nightly , Disp: , Rfl:     atorvastatin (LIPITOR) 10 MG tablet, Take 5 mg by mouth daily , Disp: , Rfl:     COCONUT OIL PO, Take 1,000 mg by mouth, Disp: , Rfl:     Ascorbic Acid (VITAMIN C PO), Take 1,000 mg by mouth daily, Disp: , Rfl:     docusate sodium (COLACE) 100 MG capsule, Take 200 mg by mouth daily, Disp: , Rfl:     Glucosamine HCl (GLUCOSAMINE PO), Take by mouth daily, Disp: , Rfl:     omeprazole (PRILOSEC) 20 MG capsule, Take 20 mg by mouth Daily , Disp: , Rfl:     vitamin D (CHOLECALCIFEROL) 1000 UNIT TABS tablet, Take 1,000 Units by mouth daily. , Disp: , Rfl:   Allergies:  Allegra allergy [fexofenadine hydrochloride]; Bactrim i.v. [sulfamethoxazole-trimethoprim]; Celecoxib; Gabapentin; Lamisil [terbinafine]; Neurontin [gabapentin]; Statins depletion therapy; Sulfamethoxazole-trimethoprim; and Sulfamethoxazole-trimethoprim  Social History:    reports that she quit smoking about 38 years ago. She has never used smokeless tobacco. She reports current alcohol use. She reports that she does not use drugs.   Family History:   Family History   Problem Relation Age of Onset    Heart Disease Father         heart mumur    Stroke Father     High Blood Pressure Father     Arthritis Mother     High Blood Pressure Mother     High Blood Pressure Sister     Emphysema Sister     Cancer Brother     Diabetes Brother     Stroke Brother     High Blood Pressure Brother     Cancer Brother     High Blood Pressure Sister     Heart Disease Sister     Cancer Sister     No Known Problems Maternal Aunt     No Known Problems Maternal Uncle     No Known Problems Paternal Aunt     No Known Problems Paternal Uncle     No Known Problems Maternal Grandmother     No Known Problems Maternal Grandfather     No Known Problems Paternal Grandmother     No Known Problems Paternal Grandfather     No Known Problems Other     Anesth Problems Neg Hx     Broken Bones Neg Hx     Clotting Disorder Neg Hx     Collagen Disease Neg Hx     Dislocations Neg Hx     Osteoporosis Neg Hx     Rheumatologic Disease Neg Hx     Scoliosis Neg Hx     Severe Sprains Neg Hx        REVIEW OF SYSTEMS:   For new problems, a full review of systems will be found scanned in the patient's chart. CONSTITUTIONAL: Denies unexplained weight loss, fevers, chills   NEUROLOGICAL: Denies unsteady gait or progressive weakness  SKIN: Denies skin changes, delayed healing, rash, itching       PHYSICAL EXAM:    Vitals: Height 5' 2.01\" (1.575 m), weight 169 lb 15.6 oz (77.1 kg). GENERAL EXAM:  · General Apparence: Patient is adequately groomed with no evidence of malnutrition. · Orientation: The patient is oriented to time, place and person. · Mood & Affect:The patient's mood and affect are appropriate       Right Knee PHYSICAL EXAMINATION:  · Inspection: Upon inspection, the patient has mild swelling. No erythema. · Palpation: Medial joint line is tender    · Range of Motion: 0 to 110 degrees    · Strength: The extensor mechanism is intact     · Special Tests: ACL PCL MCL and LCL feel stable          · Skin:  There are no rashes, ulcerations or lesions. · There are no dysvascular changes     Gait & station: Slightly antalgic favoring the right knee      Additional Examinations:        Left Lower Extremity: Examination of the left lower extremity does not show any tenderness, deformity or injury. Range of motion is unremarkable. There is no gross instability. There are no rashes, ulcerations or lesions. Strength and tone are normal.      Diagnostic Testing:      Orders   No orders of the defined types were placed in this encounter. Assessment / Treatment Plan:     1. Right knee osteoarthritis    I discussed treatment options with the patient. She is interested in discussing the possibility of total joint replacement later this year.   Today, she would like to have a cortisone injection, she is

## 2020-05-26 ENCOUNTER — HOSPITAL ENCOUNTER (OUTPATIENT)
Dept: MRI IMAGING | Age: 70
Discharge: HOME | End: 2020-05-26
Payer: MEDICARE

## 2020-05-26 PROCEDURE — A9579 GAD-BASE MR CONTRAST NOS,1ML: HCPCS | Performed by: INTERNAL MEDICINE

## 2020-05-26 PROCEDURE — 74183 MRI ABD W/O CNTR FLWD CNTR: CPT

## 2020-05-26 PROCEDURE — 6360000004 HC RX CONTRAST MEDICATION: Performed by: INTERNAL MEDICINE

## 2020-05-26 RX ADMIN — GADOTERIDOL 15 ML: 279.3 INJECTION, SOLUTION INTRAVENOUS at 09:30

## 2020-10-07 ENCOUNTER — OFFICE VISIT (OUTPATIENT)
Dept: ORTHOPEDIC SURGERY | Age: 70
End: 2020-10-07
Payer: MEDICARE

## 2020-10-07 VITALS — HEIGHT: 62 IN | BODY MASS INDEX: 31.1 KG/M2 | WEIGHT: 169 LBS

## 2020-10-07 PROCEDURE — 4040F PNEUMOC VAC/ADMIN/RCVD: CPT | Performed by: PHYSICIAN ASSISTANT

## 2020-10-07 PROCEDURE — 99214 OFFICE O/P EST MOD 30 MIN: CPT | Performed by: PHYSICIAN ASSISTANT

## 2020-10-07 PROCEDURE — 3017F COLORECTAL CA SCREEN DOC REV: CPT | Performed by: PHYSICIAN ASSISTANT

## 2020-10-07 PROCEDURE — 1036F TOBACCO NON-USER: CPT | Performed by: PHYSICIAN ASSISTANT

## 2020-10-07 PROCEDURE — 1123F ACP DISCUSS/DSCN MKR DOCD: CPT | Performed by: PHYSICIAN ASSISTANT

## 2020-10-07 PROCEDURE — G8417 CALC BMI ABV UP PARAM F/U: HCPCS | Performed by: PHYSICIAN ASSISTANT

## 2020-10-07 PROCEDURE — G8427 DOCREV CUR MEDS BY ELIG CLIN: HCPCS | Performed by: PHYSICIAN ASSISTANT

## 2020-10-07 PROCEDURE — G8400 PT W/DXA NO RESULTS DOC: HCPCS | Performed by: PHYSICIAN ASSISTANT

## 2020-10-07 PROCEDURE — G8484 FLU IMMUNIZE NO ADMIN: HCPCS | Performed by: PHYSICIAN ASSISTANT

## 2020-10-07 PROCEDURE — 1090F PRES/ABSN URINE INCON ASSESS: CPT | Performed by: PHYSICIAN ASSISTANT

## 2020-10-07 NOTE — PROGRESS NOTES
developed aspiration pneumonia    Reflux     Wears glasses       Past Surgical History:     Past Surgical History:   Procedure Laterality Date    ACHILLES TENDON SURGERY Right 01/19/2018    BACK SURGERY      steroid injections    BUNIONECTOMY Right 11/14/13    SILVER BUNIONECTOMY RIGHT FOOT; ARTHODESIS RIGHT SECOND DIGIT; EXOSTECTOMY RIGHT HALLUX; CAPSULOTOMY AND TENOTOMY RIGHT SECOND METATARSOPHANLANGEAL JOINT    CARPAL TUNNEL RELEASE Bilateral     CHOLECYSTECTOMY      FOOT SURGERY Left 06/28/2018    HAND SURGERY Right     HAND SURGERY Right 8/29/2019    EXCISE MASS RIGHT HAND performed by ySl Quinn MD at [address] [place] [address] ARTHROSCOPY Left 2014    KNEE ARTHROSCOPY Right     LITHOTRIPSY      ROTATOR CUFF REPAIR      left    SHOULDER ARTHROSCOPY  8/29/12    with rotator cuff repair on right    SIGMOIDOSCOPY      TONSILLECTOMY      TUBAL LIGATION      UPPER GASTROINTESTINAL ENDOSCOPY N/A 5/23/2019    UPPER EUS W/ANES.  (11:30) performed by Alexander Bauman DO at [place] [address]  5/23/2019    EGD BIOPSY performed by Alexander Bauman DO at [facility]     Current Medications:     Current Outpatient Medications:     vitamin E 400 UNIT capsule, Take 400 Units by mouth 2 times daily, Disp: , Rfl:     citalopram (CELEXA) 20 MG tablet, Take 20 mg by mouth daily , Disp: , Rfl:     diclofenac sodium 1 % GEL, Apply 4 g topically as needed , Disp: , Rfl:     hydrALAZINE (APRESOLINE) 10 MG tablet, Take 10 mg by mouth, Disp: , Rfl:     losartan (COZAAR) 100 MG tablet, Take 100 mg by mouth daily , Disp: , Rfl:     metoprolol succinate (TOPROL XL) 50 MG extended release tablet, Take 50 mg by mouth nightly , Disp: , Rfl:     atorvastatin (LIPITOR) 10 MG tablet, Take 5 mg by mouth daily , Disp: , Rfl:     COCONUT OIL PO, Take 1,000 mg by mouth, Disp: , Rfl:     Ascorbic Acid (VITAMIN C PO), Take 1,000 mg by mouth daily, Disp: , Rfl:     docusate sodium (COLACE) 100 MG capsule, Take 200 mg by mouth daily, Disp: , Rfl:     Glucosamine HCl (GLUCOSAMINE PO), Take by mouth daily, Disp: , Rfl:     omeprazole (PRILOSEC) 20 MG capsule, Take 20 mg by mouth Daily , Disp: , Rfl:     vitamin D (CHOLECALCIFEROL) 1000 UNIT TABS tablet, Take 1,000 Units by mouth daily. , Disp: , Rfl:   Allergies:  Allegra allergy [fexofenadine hydrochloride]; Bactrim i.v. [sulfamethoxazole-trimethoprim]; Celecoxib; Gabapentin; Lamisil [terbinafine]; Neurontin [gabapentin]; Statins depletion therapy; Sulfamethoxazole-trimethoprim; and Sulfamethoxazole-trimethoprim  Social History:    reports that she quit smoking about 39 years ago. She has never used smokeless tobacco. She reports current alcohol use. She reports that she does not use drugs.   Family History:   Family History   Problem Relation Age of Onset    Heart Disease Father         heart mumur    Stroke Father     High Blood Pressure Father     Arthritis Mother     High Blood Pressure Mother     High Blood Pressure Sister     Emphysema Sister     Cancer Brother     Diabetes Brother     Stroke Brother     High Blood Pressure Brother     Cancer Brother     High Blood Pressure Sister     Heart Disease Sister     Cancer Sister     No Known Problems Maternal Aunt     No Known Problems Maternal Uncle     No Known Problems Paternal Aunt     No Known Problems Paternal Uncle     No Known Problems Maternal Grandmother     No Known Problems Maternal Grandfather     No Known Problems Paternal Grandmother     No Known Problems Paternal Grandfather     No Known Problems Other     Anesth Problems Neg Hx     Broken Bones Neg Hx     Clotting Disorder Neg Hx     Collagen Disease Neg Hx     Dislocations Neg Hx     Osteoporosis Neg Hx     Rheumatologic Disease Neg Hx     Scoliosis Neg Hx     Severe Sprains Neg Hx        REVIEW OF SYSTEMS:   For new problems, a full review of systems will be found scanned in the patient's chart. CONSTITUTIONAL: Denies unexplained weight loss, fevers, chills   NEUROLOGICAL: Denies unsteady gait or progressive weakness  SKIN: Denies skin changes, delayed healing, rash, itching       PHYSICAL EXAM:    Vitals: Height 5' 2.01\" (1.575 m), weight 169 lb (76.7 kg). GENERAL EXAM:  · General Apparence: Patient is adequately groomed with no evidence of malnutrition. · Orientation: The patient is oriented to time, place and person. · Mood & Affect:The patient's mood and affect are appropriate       Right knee PHYSICAL EXAMINATION:  · Inspection: No visible deformity. No significant edema, erythema or ecchymosis. · Palpation: There is to palpation diffusely along the medial and anterior aspect of the knee    · Range of Motion: Range of motion is limited approximately 0 to 115 degrees    · Strength: No gross strength deficits are noted    · Special Tests: Ligamentous testing is normal.  Negative Homans testing. · Skin:  There are no rashes, ulcerations or lesions. · There are no dysvascular changes     Gait & station: Antalgic      Additional Examinations:        Left Lower Extremity: Examination of the left lower extremity does not show any tenderness, deformity or injury. Range of motion is unremarkable. There is no gross instability. There are no rashes, ulcerations or lesions. Strength and tone are normal.      Diagnostic Testing:   The following x rays were read and interpreted by myself      1.  3 x-rays of the right knee were taken today and reveal absolutely end-stage bone-on-bone osteoarthritis with osteophyte formation    Orders     Orders Placed This Encounter   Procedures    XR KNEE RIGHT (3 VIEWS)     Standing Status:   Future     Number of Occurrences:   1     Standing Expiration Date:   10/7/2021     Order Specific Question:   Reason for exam:     Answer:   PAIN         Assessment / Treatment Plan: 1.  Right knee osteoarthritis, severe    Given her advanced osteoarthritis and failure to improve with conservative measures, we are going to move forward with a right total knee replacement. We will plan to perform a right total knee replacement using Mirantis system. We will utilize Oxinium cross-link polyethylene as recommended by the demand matching tool. The patient left the clinic before receiving her knee immobilizer. We will need to supply her with 1 at the hospital.    We discussed the risk, benefits, and potential complications of total knee replacement arthroplasty surgery. The patient voiced their understanding to concerns that include infection, deep vein thrombosis, neurological injury, and delayed rehabilitation. The patient also realizes that there are concerns regarding the potential need for manipulation under anesthesia if range of motion proves to be problematic. The patient also understands that there is always a chance of dystrophy and anesthetic complications that would include a stroke, cardiopulmonary pathology, and even death. We also discussed the rehabilitation involved with this operation and options that involved not only the hospitalization but then the potential of either going home with visiting home therapy versus going to a rehabilitation facility. We talked about the nuances of each of these treatments. The patient also realizes the need for the knee brace in the rehabilitation process as well as the very significant role that the patient plays in terms of rehabilitation after this type of operation. All questions were answered. I spent 25 minutes face-to-face with the patient and greater than 50% of that time was spent counseling/coordinating care for the above-stated diagnosis      Jean Pierre BrownMay[facility] Clinic [facility]    This dictation was performed with a verbal recognition program (DRAGON) and it was checked for errors.  It is possible that there are still dictated errors within this office note. If so, please bring any errors to my attention for an addendum. All efforts were made to ensure that this office note is accurate.

## 2020-10-15 ENCOUNTER — TELEPHONE (OUTPATIENT)
Dept: ORTHOPEDIC SURGERY | Age: 70
End: 2020-10-15

## 2020-10-15 NOTE — TELEPHONE ENCOUNTER
COVID: 11/16/2020- negative   Orthopedic Nurse Navigator Summary  -  Patient Name: Buzz Curiel  Anticipated Date of Surgery:11/20/2020  Using [employer]? Yes, Are they Registered: Yes  Attended Pre-Op Education Class: No  If No, why not? PCP:  Phone #:  Date of PCP Visit for H&P: 11/05/2020  Any Noted Concerns from PCP prior to surgery: No  If Yes, what concerns?:  Is the Patient in a Pain Management Program?: No  Review of Past Medical History Reveals History of:  -  Critical Lab Values:  - Hemoglobin (g/dL): Date Value  - Hematocrit (%): Date Value  - HgbA1C : Date Value  - Albumin : Date Value  - BUN (mg/dL) : Date Value  - CREATININE (mg/dL) : Date Value  - BMI (kg/m2) : Date Value  -  Coronary Artery Disease/HTN/CHF History: Yes  Cardiologist: HTN managed by PCP and meds  Cardiac Clearance Necessary: No  Date of Cardiac Clearance Appt: On Plavix? No  If YES, when will they stop taking? Final Cardiac Recommendations: On any anticoagulation: No  -  Diabetes History: No  Most Recent HgbA1C:  PCP or Endocrine Recommendations:  Nutritionist/Dietician Consult Scheduled:  Final Plan For Diabetic Control:  Pulmonary: COPD/Emphysema/ Use of home oxygen: NONE  Alcohol use: Non-Drinker  -  DVT Risk Stratification: Low Risk  Vascular Consult Ordered:  Date of Vascular Appt:  Hematology/Oncology Consult Ordered:  Date of Hematology/Oncology Appt:  Final Recommendation For DVT Prophylaxis:  Smoking history: Non-Smoker  Use of Estrogen:  -  BMI Greater than 40 at time of scheduling?: No  Has Surgeon been notified of BMI concern? No  Weight Loss Clinic Consult Ordered No  Date of Wt Loss Clinic Appt:  BMI at time of surgery (if went through [facility]):  -  Additional Medical Concerns: Additional Recommendations for above concerns:  Attended Pre-Hab Program: No  Anticipated Discharge Disposition: D/C home  Who will be with patient at home following discharge?  Niece and daughter  Equipment patient already has: Cane, walker, and rollinator  Bedroom on first or second floor: First  Bathroom on first or second floor: first  Weight bearing status: full  Pre-op ambulatory status:  Number of entry steps: FIVE  Caregiver assistance: full time  Bishnu RodirguezHeart [facility] of [facility]  11/04/2020

## 2020-11-04 ENCOUNTER — TELEPHONE (OUTPATIENT)
Dept: ORTHOPEDIC SURGERY | Age: 70
End: 2020-11-04

## 2020-11-06 ENCOUNTER — HOSPITAL ENCOUNTER (OUTPATIENT)
Dept: PREADMISSION TESTING | Age: 70
Discharge: HOME | End: 2020-11-10
Payer: MEDICARE

## 2020-11-06 LAB
ABO/RH: NORMAL
ANTIBODY SCREEN: NORMAL

## 2020-11-06 PROCEDURE — 86900 BLOOD TYPING SEROLOGIC ABO: CPT

## 2020-11-06 PROCEDURE — 86850 RBC ANTIBODY SCREEN: CPT

## 2020-11-06 PROCEDURE — 86901 BLOOD TYPING SEROLOGIC RH(D): CPT

## 2020-11-06 PROCEDURE — 36415 COLL VENOUS BLD VENIPUNCTURE: CPT

## 2020-11-13 NOTE — PROGRESS NOTES
Obstructive Sleep Apnea (HANS) Screening     Patient:  Howard Arguelles    YOB: 1950      Medical Record #:  [id number]                     Date:  11/13/2020     1. Are you a loud and/or regular snorer? [x]  Yes       [] No    2. Have you been observed to gasp or stop breathing during sleep? []  Yes       [x] No    3. Do you feel tired or groggy upon awakening or do you awaken with a headache?           []  Yes       [x] No    4. Are you often tired or fatigued during the wake time hours? []  Yes       [x] No    5. Do you fall asleep sitting, reading, watching TV or driving? []  Yes       [x] No    6. Do you often have problems with memory or concentration? []  Yes       [x] No    If patient's HANS score if greater than or equal to 3, they are considered high risk for HANS. An anesthesia provider will evaluate the patient and develop a plan of care the day of surgery. Note:  If the patient's BMI is more than 35 kg m¯² , has neck circumference > 40 cm, and/or high blood pressure the risk is greater (© American [employer]nea [employer], 2006).

## 2020-11-16 ENCOUNTER — OFFICE VISIT (OUTPATIENT)
Dept: PRIMARY CARE CLINIC | Age: 70
End: 2020-11-16
Payer: MEDICARE

## 2020-11-16 ENCOUNTER — HOSPITAL ENCOUNTER (OUTPATIENT)
Dept: PREADMISSION TESTING | Age: 70
Discharge: HOME | End: 2020-11-20
Payer: MEDICARE

## 2020-11-16 PROCEDURE — 87086 URINE CULTURE/COLONY COUNT: CPT

## 2020-11-16 PROCEDURE — 99211 OFF/OP EST MAY X REQ PHY/QHP: CPT | Performed by: NURSE PRACTITIONER

## 2020-11-16 NOTE — PATIENT INSTRUCTIONS

## 2020-11-16 NOTE — PROGRESS NOTES
Cheikh Sullivan received a viral test for COVID-19. They were educated on isolation and quarantine as appropriate. For any symptoms, they were directed to seek care from their PCP, given contact information to establish with a doctor, directed to an urgent care or the emergency room.

## 2020-11-17 LAB
SARS-COV-2: NOT DETECTED
URINE CULTURE, ROUTINE: NORMAL

## 2020-11-18 ENCOUNTER — ANESTHESIA EVENT (OUTPATIENT)
Dept: OPERATING ROOM | Age: 70
End: 2020-11-18
Payer: MEDICARE

## 2020-11-20 ENCOUNTER — ANESTHESIA (OUTPATIENT)
Dept: OPERATING ROOM | Age: 70
End: 2020-11-20
Payer: MEDICARE

## 2020-11-20 ENCOUNTER — HOSPITAL ENCOUNTER (OUTPATIENT)
Age: 70
Discharge: HOME | End: 2020-11-20
Attending: ORTHOPAEDIC SURGERY | Admitting: ORTHOPAEDIC SURGERY
Payer: MEDICARE

## 2020-11-20 ENCOUNTER — APPOINTMENT (OUTPATIENT)
Dept: GENERAL RADIOLOGY | Age: 70
End: 2020-11-20
Attending: ORTHOPAEDIC SURGERY
Payer: MEDICARE

## 2020-11-20 VITALS
DIASTOLIC BLOOD PRESSURE: 84 MMHG | SYSTOLIC BLOOD PRESSURE: 175 MMHG | RESPIRATION RATE: 17 BRPM | OXYGEN SATURATION: 96 %

## 2020-11-20 VITALS
BODY MASS INDEX: 31.47 KG/M2 | HEIGHT: 62 IN | WEIGHT: 171 LBS | HEART RATE: 63 BPM | TEMPERATURE: 97 F | DIASTOLIC BLOOD PRESSURE: 63 MMHG | SYSTOLIC BLOOD PRESSURE: 115 MMHG | OXYGEN SATURATION: 96 % | RESPIRATION RATE: 18 BRPM

## 2020-11-20 PROBLEM — Z96.651 STATUS POST TOTAL RIGHT KNEE REPLACEMENT: Status: ACTIVE | Noted: 2020-11-20

## 2020-11-20 PROBLEM — Z96.651 S/P TOTAL KNEE ARTHROPLASTY, RIGHT: Status: ACTIVE | Noted: 2020-11-20

## 2020-11-20 LAB
ABO/RH: NORMAL
ANTIBODY SCREEN: NORMAL

## 2020-11-20 PROCEDURE — 2580000003 HC RX 258: Performed by: ANESTHESIOLOGY

## 2020-11-20 PROCEDURE — 88305 TISSUE EXAM BY PATHOLOGIST: CPT

## 2020-11-20 PROCEDURE — 86850 RBC ANTIBODY SCREEN: CPT

## 2020-11-20 PROCEDURE — 2500000003 HC RX 250 WO HCPCS: Performed by: ANESTHESIOLOGY

## 2020-11-20 PROCEDURE — 6370000000 HC RX 637 (ALT 250 FOR IP): Performed by: ANESTHESIOLOGY

## 2020-11-20 PROCEDURE — 3700000001 HC ADD 15 MINUTES (ANESTHESIA): Performed by: ORTHOPAEDIC SURGERY

## 2020-11-20 PROCEDURE — 88311 DECALCIFY TISSUE: CPT

## 2020-11-20 PROCEDURE — 97535 SELF CARE MNGMENT TRAINING: CPT

## 2020-11-20 PROCEDURE — C1776 JOINT DEVICE (IMPLANTABLE): HCPCS | Performed by: ORTHOPAEDIC SURGERY

## 2020-11-20 PROCEDURE — 2500000003 HC RX 250 WO HCPCS: Performed by: ORTHOPAEDIC SURGERY

## 2020-11-20 PROCEDURE — 6360000002 HC RX W HCPCS: Performed by: ORTHOPAEDIC SURGERY

## 2020-11-20 PROCEDURE — 6360000002 HC RX W HCPCS: Performed by: NURSE ANESTHETIST, CERTIFIED REGISTERED

## 2020-11-20 PROCEDURE — 7100000001 HC PACU RECOVERY - ADDTL 15 MIN: Performed by: ORTHOPAEDIC SURGERY

## 2020-11-20 PROCEDURE — 3700000000 HC ANESTHESIA ATTENDED CARE: Performed by: ORTHOPAEDIC SURGERY

## 2020-11-20 PROCEDURE — C9290 INJ, BUPIVACAINE LIPOSOME: HCPCS | Performed by: ORTHOPAEDIC SURGERY

## 2020-11-20 PROCEDURE — 7100000000 HC PACU RECOVERY - FIRST 15 MIN: Performed by: ORTHOPAEDIC SURGERY

## 2020-11-20 PROCEDURE — 2500000003 HC RX 250 WO HCPCS: Performed by: NURSE ANESTHETIST, CERTIFIED REGISTERED

## 2020-11-20 PROCEDURE — 7100000010 HC PHASE II RECOVERY - FIRST 15 MIN: Performed by: ORTHOPAEDIC SURGERY

## 2020-11-20 PROCEDURE — 64447 NJX AA&/STRD FEMORAL NRV IMG: CPT | Performed by: ANESTHESIOLOGY

## 2020-11-20 PROCEDURE — 6360000002 HC RX W HCPCS: Performed by: ANESTHESIOLOGY

## 2020-11-20 PROCEDURE — 3600000005 HC SURGERY LEVEL 5 BASE: Performed by: ORTHOPAEDIC SURGERY

## 2020-11-20 PROCEDURE — C1713 ANCHOR/SCREW BN/BN,TIS/BN: HCPCS | Performed by: ORTHOPAEDIC SURGERY

## 2020-11-20 PROCEDURE — 97165 OT EVAL LOW COMPLEX 30 MIN: CPT

## 2020-11-20 PROCEDURE — 2580000003 HC RX 258: Performed by: ORTHOPAEDIC SURGERY

## 2020-11-20 PROCEDURE — 86900 BLOOD TYPING SEROLOGIC ABO: CPT

## 2020-11-20 PROCEDURE — 73560 X-RAY EXAM OF KNEE 1 OR 2: CPT

## 2020-11-20 PROCEDURE — 7100000011 HC PHASE II RECOVERY - ADDTL 15 MIN: Performed by: ORTHOPAEDIC SURGERY

## 2020-11-20 PROCEDURE — 3600000015 HC SURGERY LEVEL 5 ADDTL 15MIN: Performed by: ORTHOPAEDIC SURGERY

## 2020-11-20 PROCEDURE — 97116 GAIT TRAINING THERAPY: CPT

## 2020-11-20 PROCEDURE — 2709999900 HC NON-CHARGEABLE SUPPLY: Performed by: ORTHOPAEDIC SURGERY

## 2020-11-20 PROCEDURE — 86901 BLOOD TYPING SEROLOGIC RH(D): CPT

## 2020-11-20 PROCEDURE — 97161 PT EVAL LOW COMPLEX 20 MIN: CPT

## 2020-11-20 DEVICE — GENESIS II NON-POROUS TIBIAL                                    BASEPLATE SIZE 3 RIGHT
Type: IMPLANTABLE DEVICE | Site: KNEE | Status: FUNCTIONAL
Brand: GENESIS II

## 2020-11-20 DEVICE — LEGION NARROW POSTERIOR STABILIZED                                    OXINIUM SIZE 5N RIGHT
Type: IMPLANTABLE DEVICE | Site: KNEE | Status: FUNCTIONAL
Brand: LEGION

## 2020-11-20 DEVICE — RIMMED SPEED PIN 45MM STERILE: Type: IMPLANTABLE DEVICE | Site: KNEE | Status: FUNCTIONAL

## 2020-11-20 DEVICE — GENESIS II RESURFACING PATELLAR                                    PROSTHESIS  32MM
Type: IMPLANTABLE DEVICE | Site: KNEE | Status: FUNCTIONAL
Brand: GENESIS II

## 2020-11-20 DEVICE — LEGION POSTERIOR STABILIZED HIGH                                    FLEX HIGHLY CROSS LINKED                                    POLYETHYLENE SIZE 3-4 9MM
Type: IMPLANTABLE DEVICE | Site: KNEE | Status: FUNCTIONAL
Brand: LEGION

## 2020-11-20 DEVICE — RALLY HV BONE CEMENT 40 GRAMS
Type: IMPLANTABLE DEVICE | Site: KNEE | Status: FUNCTIONAL
Brand: RALLY

## 2020-11-20 DEVICE — GENESIS TROCHLEAR PIN 1/8 X 3
Type: IMPLANTABLE DEVICE | Status: FUNCTIONAL
Brand: GENESIS

## 2020-11-20 RX ORDER — FENTANYL CITRATE 50 UG/ML
50 INJECTION, SOLUTION INTRAMUSCULAR; INTRAVENOUS EVERY 5 MIN PRN
Status: DISCONTINUED | OUTPATIENT
Start: 2020-11-20 | End: 2020-11-20 | Stop reason: HOSPADM

## 2020-11-20 RX ORDER — ONDANSETRON 2 MG/ML
4 INJECTION INTRAMUSCULAR; INTRAVENOUS EVERY 6 HOURS PRN
Status: CANCELLED | OUTPATIENT
Start: 2020-11-20

## 2020-11-20 RX ORDER — OXYCODONE HYDROCHLORIDE 5 MG/1
5 TABLET ORAL PRN
Status: COMPLETED | OUTPATIENT
Start: 2020-11-20 | End: 2020-11-20

## 2020-11-20 RX ORDER — SODIUM CHLORIDE 0.9 % (FLUSH) 0.9 %
10 SYRINGE (ML) INJECTION PRN
Status: DISCONTINUED | OUTPATIENT
Start: 2020-11-20 | End: 2020-11-20 | Stop reason: HOSPADM

## 2020-11-20 RX ORDER — SODIUM CHLORIDE 9 MG/ML
INJECTION, SOLUTION INTRAVENOUS CONTINUOUS
Status: CANCELLED | OUTPATIENT
Start: 2020-11-20

## 2020-11-20 RX ORDER — PROMETHAZINE HYDROCHLORIDE 25 MG/1
12.5 TABLET ORAL EVERY 6 HOURS PRN
Status: CANCELLED | OUTPATIENT
Start: 2020-11-20

## 2020-11-20 RX ORDER — PROPOFOL 10 MG/ML
INJECTION, EMULSION INTRAVENOUS PRN
Status: DISCONTINUED | OUTPATIENT
Start: 2020-11-20 | End: 2020-11-20 | Stop reason: SDUPTHER

## 2020-11-20 RX ORDER — SODIUM CHLORIDE 0.9 % (FLUSH) 0.9 %
10 SYRINGE (ML) INJECTION PRN
Status: CANCELLED | OUTPATIENT
Start: 2020-11-20

## 2020-11-20 RX ORDER — ROCURONIUM BROMIDE 10 MG/ML
INJECTION, SOLUTION INTRAVENOUS PRN
Status: DISCONTINUED | OUTPATIENT
Start: 2020-11-20 | End: 2020-11-20 | Stop reason: SDUPTHER

## 2020-11-20 RX ORDER — SODIUM CHLORIDE 0.9 % (FLUSH) 0.9 %
10 SYRINGE (ML) INJECTION EVERY 12 HOURS SCHEDULED
Status: CANCELLED | OUTPATIENT
Start: 2020-11-20

## 2020-11-20 RX ORDER — DEXAMETHASONE SODIUM PHOSPHATE 4 MG/ML
INJECTION, SOLUTION INTRA-ARTICULAR; INTRALESIONAL; INTRAMUSCULAR; INTRAVENOUS; SOFT TISSUE PRN
Status: DISCONTINUED | OUTPATIENT
Start: 2020-11-20 | End: 2020-11-20 | Stop reason: SDUPTHER

## 2020-11-20 RX ORDER — HYDRALAZINE HYDROCHLORIDE 10 MG/1
10 TABLET, FILM COATED ORAL 2 TIMES DAILY
Status: CANCELLED | OUTPATIENT
Start: 2020-11-20

## 2020-11-20 RX ORDER — MORPHINE SULFATE 4 MG/ML
4 INJECTION, SOLUTION INTRAMUSCULAR; INTRAVENOUS
Status: CANCELLED | OUTPATIENT
Start: 2020-11-20

## 2020-11-20 RX ORDER — FENTANYL CITRATE 50 UG/ML
25 INJECTION, SOLUTION INTRAMUSCULAR; INTRAVENOUS EVERY 5 MIN PRN
Status: DISCONTINUED | OUTPATIENT
Start: 2020-11-20 | End: 2020-11-20 | Stop reason: HOSPADM

## 2020-11-20 RX ORDER — LABETALOL HYDROCHLORIDE 5 MG/ML
5 INJECTION, SOLUTION INTRAVENOUS EVERY 10 MIN PRN
Status: DISCONTINUED | OUTPATIENT
Start: 2020-11-20 | End: 2020-11-20 | Stop reason: HOSPADM

## 2020-11-20 RX ORDER — OXYCODONE HYDROCHLORIDE 5 MG/1
10 TABLET ORAL PRN
Status: COMPLETED | OUTPATIENT
Start: 2020-11-20 | End: 2020-11-20

## 2020-11-20 RX ORDER — PROMETHAZINE HYDROCHLORIDE 25 MG/ML
6.25 INJECTION, SOLUTION INTRAMUSCULAR; INTRAVENOUS
Status: DISCONTINUED | OUTPATIENT
Start: 2020-11-20 | End: 2020-11-20 | Stop reason: HOSPADM

## 2020-11-20 RX ORDER — ACETAMINOPHEN 500 MG
1000 TABLET ORAL ONCE
Status: COMPLETED | OUTPATIENT
Start: 2020-11-20 | End: 2020-11-20

## 2020-11-20 RX ORDER — OXYCODONE HYDROCHLORIDE 5 MG/1
5 TABLET ORAL EVERY 4 HOURS PRN
Qty: 40 TABLET | Refills: 0 | Status: SHIPPED | OUTPATIENT
Start: 2020-11-20 | End: 2020-11-27 | Stop reason: SDUPTHER

## 2020-11-20 RX ORDER — HYDROMORPHONE HCL 110MG/55ML
PATIENT CONTROLLED ANALGESIA SYRINGE INTRAVENOUS PRN
Status: DISCONTINUED | OUTPATIENT
Start: 2020-11-20 | End: 2020-11-20 | Stop reason: SDUPTHER

## 2020-11-20 RX ORDER — ATORVASTATIN CALCIUM 10 MG/1
5 TABLET, FILM COATED ORAL DAILY
Status: CANCELLED | OUTPATIENT
Start: 2020-11-20

## 2020-11-20 RX ORDER — ACETAMINOPHEN 325 MG/1
650 TABLET ORAL EVERY 6 HOURS
Status: CANCELLED | OUTPATIENT
Start: 2020-11-20

## 2020-11-20 RX ORDER — PANTOPRAZOLE SODIUM 40 MG/1
40 TABLET, DELAYED RELEASE ORAL
Status: CANCELLED | OUTPATIENT
Start: 2020-11-20

## 2020-11-20 RX ORDER — TRANEXAMIC ACID 100 MG/ML
INJECTION, SOLUTION INTRAVENOUS PRN
Status: DISCONTINUED | OUTPATIENT
Start: 2020-11-20 | End: 2020-11-20 | Stop reason: SDUPTHER

## 2020-11-20 RX ORDER — LIDOCAINE HYDROCHLORIDE 10 MG/ML
INJECTION, SOLUTION INFILTRATION; PERINEURAL PRN
Status: DISCONTINUED | OUTPATIENT
Start: 2020-11-20 | End: 2020-11-20 | Stop reason: SDUPTHER

## 2020-11-20 RX ORDER — OXYCODONE HYDROCHLORIDE 5 MG/1
10 TABLET ORAL EVERY 4 HOURS PRN
Status: CANCELLED | OUTPATIENT
Start: 2020-11-20

## 2020-11-20 RX ORDER — SENNA AND DOCUSATE SODIUM 50; 8.6 MG/1; MG/1
1 TABLET, FILM COATED ORAL 2 TIMES DAILY
Status: CANCELLED | OUTPATIENT
Start: 2020-11-20

## 2020-11-20 RX ORDER — METOPROLOL SUCCINATE 25 MG/1
50 TABLET, EXTENDED RELEASE ORAL NIGHTLY
Status: CANCELLED | OUTPATIENT
Start: 2020-11-20

## 2020-11-20 RX ORDER — CITALOPRAM 20 MG/1
20 TABLET ORAL DAILY
Status: CANCELLED | OUTPATIENT
Start: 2020-11-20

## 2020-11-20 RX ORDER — LOSARTAN POTASSIUM 100 MG/1
100 TABLET ORAL DAILY
Status: CANCELLED | OUTPATIENT
Start: 2020-11-20

## 2020-11-20 RX ORDER — MORPHINE SULFATE 2 MG/ML
2 INJECTION, SOLUTION INTRAMUSCULAR; INTRAVENOUS
Status: CANCELLED | OUTPATIENT
Start: 2020-11-20

## 2020-11-20 RX ORDER — ONDANSETRON 2 MG/ML
INJECTION INTRAMUSCULAR; INTRAVENOUS PRN
Status: DISCONTINUED | OUTPATIENT
Start: 2020-11-20 | End: 2020-11-20 | Stop reason: SDUPTHER

## 2020-11-20 RX ORDER — ONDANSETRON 2 MG/ML
4 INJECTION INTRAMUSCULAR; INTRAVENOUS
Status: DISCONTINUED | OUTPATIENT
Start: 2020-11-20 | End: 2020-11-20 | Stop reason: HOSPADM

## 2020-11-20 RX ORDER — HYDRALAZINE HYDROCHLORIDE 20 MG/ML
5 INJECTION INTRAMUSCULAR; INTRAVENOUS EVERY 10 MIN PRN
Status: DISCONTINUED | OUTPATIENT
Start: 2020-11-20 | End: 2020-11-20 | Stop reason: HOSPADM

## 2020-11-20 RX ORDER — SODIUM CHLORIDE 0.9 % (FLUSH) 0.9 %
10 SYRINGE (ML) INJECTION EVERY 12 HOURS SCHEDULED
Status: DISCONTINUED | OUTPATIENT
Start: 2020-11-20 | End: 2020-11-20 | Stop reason: HOSPADM

## 2020-11-20 RX ORDER — HYDRALAZINE HYDROCHLORIDE 20 MG/ML
INJECTION INTRAMUSCULAR; INTRAVENOUS PRN
Status: DISCONTINUED | OUTPATIENT
Start: 2020-11-20 | End: 2020-11-20 | Stop reason: SDUPTHER

## 2020-11-20 RX ORDER — CEPHALEXIN 500 MG/1
500 CAPSULE ORAL 4 TIMES DAILY
Qty: 4 CAPSULE | Refills: 0 | Status: SHIPPED | OUTPATIENT
Start: 2020-11-20 | End: 2020-12-30 | Stop reason: ALTCHOICE

## 2020-11-20 RX ORDER — MEPERIDINE HYDROCHLORIDE 50 MG/ML
12.5 INJECTION INTRAMUSCULAR; INTRAVENOUS; SUBCUTANEOUS EVERY 5 MIN PRN
Status: DISCONTINUED | OUTPATIENT
Start: 2020-11-20 | End: 2020-11-20 | Stop reason: HOSPADM

## 2020-11-20 RX ORDER — SODIUM CHLORIDE, SODIUM LACTATE, POTASSIUM CHLORIDE, CALCIUM CHLORIDE 600; 310; 30; 20 MG/100ML; MG/100ML; MG/100ML; MG/100ML
INJECTION, SOLUTION INTRAVENOUS CONTINUOUS
Status: DISCONTINUED | OUTPATIENT
Start: 2020-11-20 | End: 2020-11-20 | Stop reason: HOSPADM

## 2020-11-20 RX ORDER — OXYCODONE HYDROCHLORIDE 5 MG/1
5 TABLET ORAL EVERY 4 HOURS PRN
Status: CANCELLED | OUTPATIENT
Start: 2020-11-20

## 2020-11-20 RX ADMIN — LIDOCAINE HYDROCHLORIDE 80 MG: 10 INJECTION, SOLUTION INFILTRATION; PERINEURAL at 07:29

## 2020-11-20 RX ADMIN — ACETAMINOPHEN 1000 MG: 500 TABLET ORAL at 06:10

## 2020-11-20 RX ADMIN — ROCURONIUM BROMIDE 50 MG: 10 SOLUTION INTRAVENOUS at 07:29

## 2020-11-20 RX ADMIN — SUGAMMADEX 200 MG: 100 INJECTION, SOLUTION INTRAVENOUS at 08:46

## 2020-11-20 RX ADMIN — DEXAMETHASONE SODIUM PHOSPHATE 8 MG: 4 INJECTION, SOLUTION INTRAMUSCULAR; INTRAVENOUS at 07:39

## 2020-11-20 RX ADMIN — ONDANSETRON 4 MG: 2 INJECTION INTRAMUSCULAR; INTRAVENOUS at 08:46

## 2020-11-20 RX ADMIN — CEFAZOLIN SODIUM 2 G: 10 INJECTION, POWDER, FOR SOLUTION INTRAVENOUS at 07:32

## 2020-11-20 RX ADMIN — ONDANSETRON 4 MG: 2 INJECTION INTRAMUSCULAR; INTRAVENOUS at 07:39

## 2020-11-20 RX ADMIN — HYDROMORPHONE HYDROCHLORIDE 1 MG: 2 INJECTION INTRAMUSCULAR; INTRAVENOUS; SUBCUTANEOUS at 07:24

## 2020-11-20 RX ADMIN — PROPOFOL 200 MG: 10 INJECTION, EMULSION INTRAVENOUS at 07:29

## 2020-11-20 RX ADMIN — HYDROMORPHONE HYDROCHLORIDE 1 MG: 2 INJECTION INTRAMUSCULAR; INTRAVENOUS; SUBCUTANEOUS at 07:44

## 2020-11-20 RX ADMIN — HYDROMORPHONE HYDROCHLORIDE 0.5 MG: 1 INJECTION, SOLUTION INTRAMUSCULAR; INTRAVENOUS; SUBCUTANEOUS at 10:03

## 2020-11-20 RX ADMIN — HYDRALAZINE HYDROCHLORIDE 5 MG: 20 INJECTION INTRAMUSCULAR; INTRAVENOUS at 07:57

## 2020-11-20 RX ADMIN — SODIUM CHLORIDE, POTASSIUM CHLORIDE, SODIUM LACTATE AND CALCIUM CHLORIDE: 600; 310; 30; 20 INJECTION, SOLUTION INTRAVENOUS at 06:10

## 2020-11-20 RX ADMIN — OXYCODONE 5 MG: 5 TABLET ORAL at 10:47

## 2020-11-20 RX ADMIN — TRANEXAMIC ACID 1000 MG: 100 INJECTION, SOLUTION INTRAVENOUS at 07:39

## 2020-11-20 RX ADMIN — FAMOTIDINE 20 MG: 10 INJECTION, SOLUTION INTRAVENOUS at 06:10

## 2020-11-20 ASSESSMENT — PULMONARY FUNCTION TESTS
PIF_VALUE: 24
PIF_VALUE: 23
PIF_VALUE: 23
PIF_VALUE: 16
PIF_VALUE: 21
PIF_VALUE: 20
PIF_VALUE: 15
PIF_VALUE: 24
PIF_VALUE: 19
PIF_VALUE: 24
PIF_VALUE: 23
PIF_VALUE: 25
PIF_VALUE: 23
PIF_VALUE: 23
PIF_VALUE: 20
PIF_VALUE: 21
PIF_VALUE: 15
PIF_VALUE: 24
PIF_VALUE: 24
PIF_VALUE: 16
PIF_VALUE: 24
PIF_VALUE: 23
PIF_VALUE: 23
PIF_VALUE: 0
PIF_VALUE: 18
PIF_VALUE: 23
PIF_VALUE: 23
PIF_VALUE: 20
PIF_VALUE: 22
PIF_VALUE: 24
PIF_VALUE: 23
PIF_VALUE: 19
PIF_VALUE: 21
PIF_VALUE: 16
PIF_VALUE: 21
PIF_VALUE: 24
PIF_VALUE: 22
PIF_VALUE: 23
PIF_VALUE: 23
PIF_VALUE: 24
PIF_VALUE: 24
PIF_VALUE: 15
PIF_VALUE: 24
PIF_VALUE: 24
PIF_VALUE: 1
PIF_VALUE: 19
PIF_VALUE: 21
PIF_VALUE: 23
PIF_VALUE: 21
PIF_VALUE: 24
PIF_VALUE: 1
PIF_VALUE: 20
PIF_VALUE: 23
PIF_VALUE: 19
PIF_VALUE: 20
PIF_VALUE: 24
PIF_VALUE: 22
PIF_VALUE: 23
PIF_VALUE: 23
PIF_VALUE: 20
PIF_VALUE: 24
PIF_VALUE: 20
PIF_VALUE: 28
PIF_VALUE: 23
PIF_VALUE: 14
PIF_VALUE: 23
PIF_VALUE: 20
PIF_VALUE: 19
PIF_VALUE: 23
PIF_VALUE: 16
PIF_VALUE: 24
PIF_VALUE: 23
PIF_VALUE: 24
PIF_VALUE: 24
PIF_VALUE: 23
PIF_VALUE: 24
PIF_VALUE: 15
PIF_VALUE: 15
PIF_VALUE: 23
PIF_VALUE: 1
PIF_VALUE: 15
PIF_VALUE: 1
PIF_VALUE: 1
PIF_VALUE: 24
PIF_VALUE: 22
PIF_VALUE: 9
PIF_VALUE: 15
PIF_VALUE: 23
PIF_VALUE: 24
PIF_VALUE: 24
PIF_VALUE: 25

## 2020-11-20 ASSESSMENT — PAIN SCALES - GENERAL
PAINLEVEL_OUTOF10: 4
PAINLEVEL_OUTOF10: 5
PAINLEVEL_OUTOF10: 5
PAINLEVEL_OUTOF10: 0
PAINLEVEL_OUTOF10: 9

## 2020-11-20 ASSESSMENT — PAIN DESCRIPTION - PAIN TYPE
TYPE: ACUTE PAIN;SURGICAL PAIN
TYPE: ACUTE PAIN;SURGICAL PAIN

## 2020-11-20 ASSESSMENT — PAIN - FUNCTIONAL ASSESSMENT: PAIN_FUNCTIONAL_ASSESSMENT: 0-10

## 2020-11-20 ASSESSMENT — PAIN DESCRIPTION - LOCATION
LOCATION: KNEE
LOCATION: KNEE

## 2020-11-20 ASSESSMENT — PAIN DESCRIPTION - ORIENTATION
ORIENTATION: RIGHT
ORIENTATION: RIGHT

## 2020-11-20 NOTE — PROGRESS NOTES
Physical Therapy    [facility]Department[facility]  Initial Assessment & Discharge Summary    NAME: Shaka Hodge  : 1950  MRN: [id number]    Date of Service: 2020    Discharge Recommendations:  Outpatient PT, 24 hour supervision or assist   PT Equipment Recommendations  Equipment Needed: No  Other: pt owns RW    Assessment   Body structures, Functions, Activity limitations: Decreased functional mobility ; Increased pain;Decreased ROM; Decreased strength;Decreased balance;Decreased posture;Decreased coordination;Decreased endurance  Assessment: Pt is a 80 yo. female who is being seen by therapy POD 0 s/p R TKR 20. Pt tolerated session well with minimal reports of fatigue or increase in pain. Pt states that she is independent at baseline for mobility tasks. Pt supervsion for bed mobility tasks in order for proper sequencing. Pt CGA progressing to SBA for ambulation with RW. Pt required CGA for curb step negotiation with RW. Pt educated on use of RW for mobility tasks. Further skilled PT intervention not required for pt in hospital as they have met their PT goals and are safe to return home. D/c acute PT to home with 24-hr supervision with OPPT recommended at this date in order to address further functional deficits s/p R TKR. Pt declined any further questions or concerns about functional mobility at this time. Treatment Diagnosis: impaired functional mobility. Prognosis: Good  Decision Making: Low Complexity  PT Education: Goals; General Safety;Gait Training;PT Role;Disease Specific Education;Plan of Care; Functional Mobility Training;Home Exercise Program;Equipment;Precautions;Weight-bearing Education; Injury Prevention;Transfer Training  Patient Education: Pt educated on use of RW for mobility tasks. Pt verbalized understanding. Barriers to Learning: none.   No Skilled PT: Safe to return home  REQUIRES PT FOLLOW UP: No  Activity Tolerance  Activity Tolerance: Patient Tolerated treatment well;Patient limited by fatigue;Patient limited by pain; Patient limited by endurance       Patient Diagnosis(es): The primary encounter diagnosis was Status post total right knee replacement. A diagnosis of Primary osteoarthritis of right knee was also pertinent to this visit. has a past medical history of Anesthesia complication, Arthritis, Bladder infection, Depression, Hyperlipidemia, Hypertension, Kidney stones, Localized osteoarthrosis not specified whether primary or secondary, lower leg, Ocular migraine, PONV (postoperative nausea and vomiting), Reflux, and Wears glasses. has a past surgical history that includes Cholecystectomy; Rotator cuff repair; Carpal tunnel release (Bilateral); Tubal ligation; Kidney stone surgery; Lithotripsy; Shoulder arthroscopy (8/29/12); Bunionectomy (Right, 11/14/13); Tonsillectomy; Sigmoidoscopy; Hemorrhoid surgery; back surgery; Knee arthroscopy (Left, 2014); Knee arthroscopy (Right); Achilles tendon surgery (Right, 01/19/2018); Foot surgery (Left, 06/28/2018); Upper gastrointestinal endoscopy (N/A, 5/23/2019); Upper gastrointestinal endoscopy (5/23/2019); Hand surgery (Right); and Hand surgery (Right, 8/29/2019). Restrictions  Restrictions/Precautions  Restrictions/Precautions: Weight Bearing, Fall Risk  Required Braces or Orthoses?: Yes  Lower Extremity Weight Bearing Restrictions  Right Lower Extremity Weight Bearing: Weight Bearing As Tolerated  Required Braces or Orthoses  Right Lower Extremity Brace: Knee Immobilizer(May remove once patient can do a straight leg raise in bed -- independent, d/c 11/20)  Position Activity Restriction  Other position/activity restrictions: Up as tolerated.      Vision/Hearing  Vision: Impaired  Vision Exceptions: Wears glasses at all times  Hearing: Exceptions to [facility]  Hearing Exceptions: Hard of hearing/hearing concerns       Subjective  General  Chart Reviewed: Yes  Patient assessed for rehabilitation services?: Yes  Response To Previous Treatment: Not applicable  Family / Caregiver Present: No  Referring Practitioner: TERRY Rivera  Referral Date : 11/20/20  Diagnosis: s/p R TKR 11/20  Follows Commands: Within Functional Limits  General Comment  Comments: Pt resting in bed upon approach. RN cleared pt for therapy. Subjective  Subjective: Pt agreeable to therapy. Pain Screening  Patient Currently in Pain: Yes  Pain Assessment  Pain Assessment: 0-10  Pain Level: 5  Pain Type: Acute pain;Surgical pain  Pain Location: Knee  Pain Orientation: Right  Non-Pharmaceutical Pain Intervention(s): Ambulation/Increased Activity;Repositioned;Cold applied  Response to Pain Intervention: Patient Satisfied       Orientation  Orientation  Overall Orientation Status: Within Normal Limits     Social/Functional History  Social/Functional History  Lives With: Spouse, Family( and Niece, Daughter, grandson (25))  Home Layout: One level  Home Access: Stairs to enter with rails  Entrance Stairs - Number of Steps: 1 + 1  Entrance Stairs - Rails: Left(L ascending)  Bathroom Shower/Tub: Tub/Shower unit  Bathroom Equipment: Grab bars in shower, Shower chair, Hand-held shower  Bathroom Accessibility: Accessible  Home Equipment: Rolling walker, 4 wheeled walker  Receives Help From: Family  ADL Assistance: [address] [place] [address] Avenue: Independent  Homemaking Responsibilities: Yes  Ambulation Assistance: Independent(Needs AD)  Transfer Assistance: Independent  Active : Yes  Mode of Transportation: Kailash Hammersmith, Car  Occupation: Retired  Type of occupation: Admin. Assistant  Leisure & Hobbies: Sewing, Garden  Additional Comments: Pt denies falls.        Objective     Observation/Palpation  Posture: Good    RLE AROM: WFL  RLE General AROM: Hip and ankle WNL, knee limited ~ 5-75 degrees s/p surgery  LLE AROM : WFL  Strength RLE: WFL  Comment: Hip and ankle WNL, knee limited s/p surgery  Strength LLE: WFL     Sensation  Overall Sensation Status: WNL     Bed Inpatient T-Scale Score : 53.28 (11/20/20 1227)  Mobility Inpatient CMS 0-100% Score: 20.91 (11/20/20 1227)  Mobility Inpatient CMS G-Code Modifier : CJ (11/20/20 1227)        Goals  Short term goals  Time Frame for Short term goals: 1 PT session  Short term goal 1: Pt will be supervision for bed mobility. -- GOAL MET 11/20  Short term goal 2: Pt will be supervision for transfer tasks. -- GOAL MET 11/20  Short term goal 3: Pt will be SBA for ambulation 100 ft with RW. -- GOAL MET 11/20  Short term goal 4: Pt will be an active participant in TKR HEP protocol by 11/20. -- GOAL MET 11/20  Short term goal 5: Pt will be CGA to negotiate curb step with RW. -- GOAL MET 11/20  Patient Goals   Patient goals : \"By the time I leave I would like to be able to walk around my house (50ft) without physical assist\". -- GOAL MET 11/20       Therapy Time   Individual Concurrent Group Co-treatment   Time In 1111         Time Out 1135         Minutes 24         Timed Code Treatment Minutes: [address] Darlene Pendleton, SPT      Supervising therapist present to direct and supervise all treatment.   Dago Quijano, PT, DPT

## 2020-11-20 NOTE — H&P
I have reviewed the history and physical and examined the patient and find no relevant changes. I have reviewed with the patient and/or family the risks, benefits, and alternatives to the procedure. Patient being given increased dosage/quantity of opoid pain medication in excess of OSMB guidelines which noted a 30 MED daily of opioids due to the fact that he/she has undergone major orthopaedic surgery as outlined in rule 4731-11-13. Dosages and further duration of the pain medication will be re-evaluated at her post op visit in 2 weeks. Patient was educated on dosing expectations and limits of prescribing as a result of the new [facility] Board rules enacted August 31, 2017. Please also note that this is not the initial opoid prescription issued to this patient but a continuation of medication utilized during the hospital admission as noted in the medical record. [place]ARRS report has also been utilized to screen for any abuse history or suspicious activity as outlined in [place]. All efforts have been taken to prevent abuse potential and misuse of opioid medications including education, screening, and close clinical follow up. Controlled Substance Monitoring:    Acute and Chronic Pain Monitoring:   RX Monitoring 11/20/2020   Acute Pain Prescriptions -   Periodic Controlled Substance Monitoring No signs of potential drug abuse or diversion identified.

## 2020-11-20 NOTE — OP NOTE
preliminary cut was made  in the tibia, removing approximately 2 mm of bone based upon the  patients overall tibial anatomy. The trial tibia was sized and placed  in the aforementioned thickness and size as mentioned above. This gave  excellent position and range of motion after trial components were  utilized. Any further tissue balancing that was necessary was performed  at this juncture. The patella was addressed. Approximately 10 mm of bone was removed from  the patella using an oscillating saw. The onlay patellar component was  sized and appropriately positioned. The knee was then taken through a  trial range of motion with excellent patellar tracking. At this point, copious irrigation was performed with the pulsatile  lavage, and the bone was prepped for cement. The permanent components  were then cemented into position. After the cement hardened, the knee  was again taken through a range of motion and gave the same excellent  alignment and stability. The permanent polyethylene prosthesis was applied to the tibia. The  pneumatic tourniquet was deflated at this point, and hemostasis was  obtained with Bovie electrocauterization. The wound was then closed in  a layered fashion. The patient was stable and taken to the recovery room with a knee  immobilization device in place.         Romario Langley MD    D: 11/20/2020 8:41:08       T: 11/20/2020 10:50:49     [email or web address]  Job#: [id number]     Doc#: [id number]    CC:

## 2020-11-20 NOTE — ANESTHESIA PROCEDURE NOTES
Peripheral Block    Patient location during procedure: pre-op  End time: 11/20/2020 6:40 AM  Staffing  Anesthesiologist: Michael Sherman MD  Performed: anesthesiologist   Preanesthetic Checklist  Completed: patient identified, site marked, surgical consent, pre-op evaluation, timeout performed, IV checked, risks and benefits discussed, monitors and equipment checked, anesthesia consent given, oxygen available and patient being monitored  Peripheral Block  Patient position: supine  Prep: ChloraPrep  Patient monitoring: cardiac monitor, continuous pulse ox, frequent blood pressure checks and IV access  Block type: Femoral  Laterality: right  Injection technique: single-shot  Procedures: ultrasound guided  Local infiltration: lidocaine  Infiltration strength: 1 %  Dose: 3 mL  Adductor canal (Low Femoral)  Provider prep: mask and sterile gloves  Local infiltration: lidocaine  Needle  Needle gauge: 21 G  Needle length: 10 cm  Needle localization: ultrasound guidance  Assessment  Injection assessment: negative aspiration for heme, no paresthesia on injection and local visualized surrounding nerve on ultrasound  Paresthesia pain: none  Slow fractionated injection: yes  Hemodynamics: stable  Additional Notes  Immediately prior to procedure a \"time out\" was called to verify the correct patient, allergies, laterality, procedure and equipment. Time out performed with RN    Local Anesthetic: 0.25 %  Bupivacaine   Amount: 30 ml  in 5 ml increments after negative aspiration each time. Versed 2 mg  IV    Iliopsoas Muscle and Fascia Iliaca, Femoral artery (Deep artery to the thigh take off), Femoral Vein and Femoral Nerve are identified; the tip of the need and the spread of the local anesthetic around the Femoral nerve are visualized. The Femoral nerve appeared to be anatomically normal and there were no abnormal pathologically findings seen.          Reason for block: post-op pain management and at surgeon's request

## 2020-11-20 NOTE — PROGRESS NOTES
Occupational Therapy   Occupational Therapy Initial Assessment and Treatment Note 1x  Date: 2020   Patient Name: Magda Finnegan  MRN: [id number]     : 1950    Date of Service: 2020    Discharge Recommendations:  24 hour supervision or assist     Assessment   Assessment: OT eval completed. Pt is POD#0 R TKR. She was educated on ADL strategies, safe mobility with RW and use of DME for ADLs at home. CGA provided for safe mobility with RW. Family will be available to assist as needed. OT will sign off. Decision Making: Medium Complexity  OT Education: OT Role;Transfer Training;Plan of Care;Equipment;ADL Adaptive Strategies  Patient Education: role of OT, ADL strategies, functional transfers  REQUIRES OT FOLLOW UP: No  Activity Tolerance  Activity Tolerance: Patient Tolerated treatment well  Safety Devices  Safety Devices in place: Yes  Type of devices: Gait belt;Call light within reach; Left in bed;Nurse notified         Patient Diagnosis(es): The primary encounter diagnosis was Status post total right knee replacement. A diagnosis of Primary osteoarthritis of right knee was also pertinent to this visit. has a past medical history of Anesthesia complication, Arthritis, Bladder infection, Depression, Hyperlipidemia, Hypertension, Kidney stones, Localized osteoarthrosis not specified whether primary or secondary, lower leg, Ocular migraine, PONV (postoperative nausea and vomiting), Reflux, and Wears glasses. has a past surgical history that includes Cholecystectomy; Rotator cuff repair; Carpal tunnel release (Bilateral); Tubal ligation; Kidney stone surgery; Lithotripsy; Shoulder arthroscopy (12); Bunionectomy (Right, 13); Tonsillectomy; Sigmoidoscopy; Hemorrhoid surgery; back surgery; Knee arthroscopy (Left, ); Knee arthroscopy (Right); Achilles tendon surgery (Right, 2018); Foot surgery (Left, 2018); Upper gastrointestinal endoscopy (N/A, 2019);  Upper gastrointestinal endoscopy (5/23/2019); Hand surgery (Right); and Hand surgery (Right, 8/29/2019). Restrictions  Restrictions/Precautions  Restrictions/Precautions: Weight Bearing, Fall Risk  Required Braces or Orthoses?: Yes  Lower Extremity Weight Bearing Restrictions  Right Lower Extremity Weight Bearing: Weight Bearing As Tolerated  Required Braces or Orthoses  Right Lower Extremity Brace: Knee Immobilizer(May remove once patient can do a straight leg raise in bed -- independent, d/c 11/20)  Position Activity Restriction  Other position/activity restrictions: Up as tolerated. Subjective   General  Chart Reviewed: Yes, Orders, Progress Notes, Operative Notes  Patient assessed for rehabilitation services?: Yes  Family / Caregiver Present: No  Referring Practitioner: CHIP Chadwick  Diagnosis: R knee OA s/p R TKR 11/20/20  Subjective  Subjective: Pt agreeable to OT  General Comment  Comments: RN approved therapy.  Pt seen in PACU  Patient Currently in Pain: Yes  Pain Assessment  Pain Assessment: 0-10  Pain Level: 5  Pain Type: Acute pain;Surgical pain  Pain Location: Knee  Pain Orientation: Right  Non-Pharmaceutical Pain Intervention(s): Ambulation/Increased Activity;Repositioned;Cold applied  Response to Pain Intervention: Patient Satisfied  Vital Signs  Pulse: 63  Resp: 19  BP: 120/60  MAP (mmHg): 74  Patient Currently in Pain: Yes  Oxygen Therapy  SpO2: 94 %  Social/Functional History  Social/Functional History  Lives With: Spouse, Family( and Niece, Daughter, grandson (25))  Home Layout: One level  Home Access: Stairs to enter with rails  Entrance Stairs - Number of Steps: 1 + 1  Entrance Stairs - Rails: Left(L ascending)  Bathroom Shower/Tub: Tub/Shower unit  Bathroom Equipment: Grab bars in shower, Shower chair, Hand-held shower  Bathroom Accessibility: Accessible  Home Equipment: Rolling walker, 4 wheeled walker  Receives Help From: Family  ADL Assistance: Independent  Homemaking Assistance: Independent  Homemaking Responsibilities: Yes  Ambulation Assistance: Independent(Needs AD)  Transfer Assistance: Independent  Active : Yes  Mode of Transportation: Nik[place] Has  Occupation: Retired  Type of occupation: Admin. Assistant  Leisure & Hobbies: Sewing, Garden  Additional Comments: Pt denies falls. Objective   Vision: Impaired  Vision Exceptions: Wears glasses at all times  Hearing: Exceptions to [facility]  Hearing Exceptions: Hard of hearing/hearing concerns    Orientation  Overall Orientation Status: Within Functional Limits  Observation/Palpation  Posture: Good  Balance  Sitting Balance: Supervision  Standing Balance: Contact guard assistance(RW)  Standing Balance  Activity: Pt instructed on safe transfer technique with walker and sequencing steps with walker. Functional Mobility  Functional - Mobility Device: Rolling Walker  Activity: To/from bathroom  Assist Level: Contact guard assistance  Toilet Transfers  Toilet - Technique: Ambulating(rw)  Equipment Used: Standard toilet  Toilet Transfer: Contact guard assistance  Tub Transfers  Tub Transfers Comments: Pt has shower seat at home and will have assist from family for transfers. ADL  Grooming: Contact guard assistance(wash hands at sink)  LE Dressing: Moderate assistance(for brief and socks)  Toileting: Contact guard assistance(CGA for standing balance as pt was pulling up brief)  Tone RUE  RUE Tone: Normotonic  Tone LUE  LUE Tone: Normotonic  Coordination  Movements Are Fluid And Coordinated: Yes        Transfers  Stand Pivot Transfers: Contact guard assistance(rw)  Sit to stand: Contact guard assistance  Stand to sit: Contact guard assistance      Sensation  Overall Sensation Status: WNL      LUE AROM (degrees)  LUE AROM : WFL  RUE AROM (degrees)  RUE AROM : WFL  LUE Strength  Gross LUE Strength: WFL  RUE Strength  Gross RUE Strength: WFL     Car Transfers  Car Transfers: Pt instructed on safe car transfer technique.  She reported understanding

## 2020-11-20 NOTE — BRIEF OP NOTE
Brief Postoperative Note      Patient: Evan Love  YOB: 1950  MRN: [id number]    Date of Procedure: 11/20/2020    Pre-Op Diagnosis: RIGHT KNEE OSTEOARTHRITIS    Post-Op Diagnosis: Same       Procedure(s):  RIGHT TOTAL KNEE REPLACEMENT       [employer]    Surgeon(s):  Hermes Cabello MD    Assistant:  Surgical Assistant: Azra Andre  Physician Assistant: TERRY Stone    Anesthesia: General    Estimated Blood Loss (mL): 316     Complicationsnone    Specimens:   ID Type Source Tests Collected by Time Destination   A : RIGHT KNEE BONE  Bone Joint, Knee SURGICAL PATHOLOGY Hermes Cabello MD 11/20/2020 0813        Implants:  Implant Name Type Inv. Item Serial No.  Lot No. LRB No. Used Action   PIN FIX L45MM RIMMED SPD REUSE  PIN FIX L45MM RIMMED SPD REUSE  [employer]- [id number] Right 1 Implanted   CEMENT BONE 40GM HI VISC RALLY  CEMENT BONE 40GM HI VISC [facility] [employer]TH[employer]- [id number] Right 1 Implanted   COMPONENT PAT [id number] KNEE POLYETH RESURF GEN II  COMPONENT PAT UUN14JM CYG04EE KNEE POLYETH RESURF GEN II  [employer]- [id number] Right 1 Implanted   INSERT TIB SZ 3-4 THK9MM KNEE XLPE POST STBL HI FLX LEGION  INSERT TIB SZ 3-4 THK9MM KNEE XLPE POST STBL HI FLX LEGION  [employer]- [id number] Right 1 Implanted   BASEPLATE TIB SZ 3 [id number] ML68MM THK2. 3MM R KNEE TI ALLY NP  BASEPLATE TIB SZ 3 [id number] ML68MM THK2. 3MM R KNEE TI ALLY NP  [place] [employer]- [id number] Right 1 Implanted   COMPONENT FEM SZ 5 JEANINE R KNEE OXINIUM POST STBL CLAY LEGION  COMPONENT FEM SZ 5 JEANINE R KNEE OXINIUM POST STBL CLAY Ketan Blanchard [employer] [id number] Right 1 Implanted         Drains:   NG/OG/NJ/NE Tube Orogastric (Active)       Findings: OA    Electronically signed by TERRY Tejeda on 11/20/2020 at 8:52 AM

## 2020-11-20 NOTE — CARE COORDINATION
[facility]    Referral received from CM to follow for home care services. I will follow for needs, and speak with patient to verify demos. Spoke with patient no preference in home care agency  Agreeable to services with [facility]anc[facility] Tracy 13 order noted, all docs needed have been faxed to [facility] for home care services.     Home care to see patient within 24-48 hrs    Nika Howell RN, BSN CTN  [facility] [phone]

## 2020-11-20 NOTE — ANESTHESIA PRE PROCEDURE
Department of Anesthesiology  Preprocedure Note       Name:  Harjeet Hernandez   Age:  79 y.o.  :  1950                                          MRN:  [id number]         Date:  2020      Surgeon: Lance Granados):  Santosh Dickerson MD    Procedure: Procedure(s):  RIGHT TOTAL KNEE REPLACEMENT       DUNAWAY & NEPHEW    Medications prior to admission:   Prior to Admission medications    Medication Sig Start Date End Date Taking? Authorizing Provider   vitamin E 400 UNIT capsule Take 400 Units by mouth 2 times daily    Historical Provider, MD   citalopram (CELEXA) 20 MG tablet Take 20 mg by mouth daily  17   Historical Provider, MD   diclofenac sodium 1 % GEL Apply 4 g topically as needed  17   Historical Provider, MD   hydrALAZINE (APRESOLINE) 10 MG tablet Take 10 mg by mouth daily  10/27/17   Historical Provider, MD   losartan (COZAAR) 100 MG tablet Take 100 mg by mouth daily  17   Historical Provider, MD   metoprolol succinate (TOPROL XL) 50 MG extended release tablet Take 50 mg by mouth nightly  17   Historical Provider, MD   atorvastatin (LIPITOR) 10 MG tablet Take 5 mg by mouth daily  17   Historical Provider, MD   Ascorbic Acid (VITAMIN C PO) Take 1,000 mg by mouth daily    Historical Provider, MD   docusate sodium (COLACE) 100 MG capsule Take 200 mg by mouth daily    Historical Provider, MD   Glucosamine HCl (GLUCOSAMINE PO) Take by mouth daily    Historical Provider, MD   omeprazole (PRILOSEC) 20 MG capsule Take 20 mg by mouth Daily  12/10/14   Historical Provider, MD   vitamin D (CHOLECALCIFEROL) 1000 UNIT TABS tablet Take 1,000 Units by mouth daily. Historical Provider, MD       Current medications:    No current facility-administered medications for this encounter.       Current Outpatient Medications   Medication Sig Dispense Refill    vitamin E 400 UNIT capsule Take 400 Units by mouth 2 times daily      citalopram (CELEXA) 20 MG tablet Take 20 mg by mouth daily       Medical History:        Diagnosis Date    Anesthesia complication     aspiration pneumonia X 1    Arthritis     Bladder infection     antibiotic completed 14    Depression     Hyperlipidemia     Hypertension     Kidney stones     Localized osteoarthrosis not specified whether primary or secondary, lower leg 10/2/2014    Ocular migraine     PONV (postoperative nausea and vomiting)     developed aspiration pneumonia    Reflux     Wears glasses        Past Surgical History:        Procedure Laterality Date    ACHILLES TENDON SURGERY Right 2018    BACK SURGERY      steroid injections    BUNIONECTOMY Right 13    SILVER BUNIONECTOMY RIGHT FOOT; ARTHODESIS RIGHT SECOND DIGIT; EXOSTECTOMY RIGHT HALLUX; CAPSULOTOMY AND TENOTOMY RIGHT SECOND METATARSOPHANLANGEAL JOINT    CARPAL TUNNEL RELEASE Bilateral     CHOLECYSTECTOMY      FOOT SURGERY Left 2018    HAND SURGERY Right     HAND SURGERY Right 2019    EXCISE MASS RIGHT HAND performed by Violetta Blanco MD at [address] [place] [address] ARTHROSCOPY Left     KNEE ARTHROSCOPY Right     LITHOTRIPSY      ROTATOR CUFF REPAIR      left    SHOULDER ARTHROSCOPY  12    with rotator cuff repair on right    SIGMOIDOSCOPY      TONSILLECTOMY      TUBAL LIGATION      UPPER GASTROINTESTINAL ENDOSCOPY N/A 2019    UPPER EUS W/ANES.  (11:30) performed by Dinesh Grier DO at [place] [address]  2019    EGD BIOPSY performed by Dinesh Grier DO at [facility]. [facility] History:    Social History     Tobacco Use    Smoking status: Former Smoker     Last attempt to quit: 1981     Years since quittin.2    Smokeless tobacco: Never Used   Substance Use Topics    Alcohol use: Yes     Comment: 2 drinks per year                                Counseling given: Not Answered      Vital Signs (Current):   Vitals:    11/13/20 1411   Weight: 165 lb (74.8 kg)   Height: 5' 2\" (1.575 m)                                              BP Readings from Last 3 Encounters:   08/29/19 136/68   08/29/19 139/74   05/23/19 (!) 147/71       NPO Status:                                                                                 BMI:   Wt Readings from Last 3 Encounters:   10/07/20 169 lb (76.7 kg)   03/16/20 169 lb 15.6 oz (77.1 kg)   11/19/19 169 lb 15.6 oz (77.1 kg)     Body mass index is 30.18 kg/m². CBC:   Lab Results   Component Value Date    WBC 7.0 03/24/2018    RBC 4.07 03/24/2018    HGB 13.3 03/24/2018    HCT 38.2 03/24/2018    MCV 93.8 03/24/2018    RDW 13.1 03/24/2018     03/24/2018       CMP:   Lab Results   Component Value Date     03/24/2018    K 4.4 03/24/2018     03/24/2018    CO2 26 03/24/2018    BUN 15 03/24/2018    CREATININE 0.6 03/24/2018    GFRAA >60 03/24/2018    AGRATIO 1.5 03/24/2018    LABGLOM >60 03/24/2018    GLUCOSE 110 03/24/2018    PROT 6.6 03/24/2018    PROT 7.5 02/23/2011    CALCIUM 8.9 03/24/2018    BILITOT 0.6 03/24/2018    ALKPHOS 46 03/24/2018    AST 23 03/24/2018    ALT 27 03/24/2018       POC Tests: No results for input(s): POCGLU, POCNA, POCK, POCCL, POCBUN, POCHEMO, POCHCT in the last 72 hours. Coags: No results found for: PROTIME, INR, APTT    HCG (If Applicable): No results found for: PREGTESTUR, PREGSERUM, HCG, HCGQUANT     ABGs: No results found for: PHART, PO2ART, NMZ1LDU, LEZ5WXD, BEART, N7WIYEIY     Type & Screen (If Applicable):  No results found for: LABABO, LABRH    Drug/Infectious Status (If Applicable):  Lab Results   Component Value Date    HEPCAB Non-Reactive (Negative) 03/14/2011       COVID-19 Screening (If Applicable):   Lab Results   Component Value Date    COVID19 Not Detected 11/16/2020         Anesthesia Evaluation  Patient summary reviewed and Nursing notes reviewed   history of anesthetic complications: PONV.   Airway: Mallampati: II  TM distance: >3 FB   Neck ROM: full  Mouth opening: > = 3 FB Dental: normal exam         Pulmonary:Negative Pulmonary ROS                              Cardiovascular:    (+) hypertension:,                   Neuro/Psych:   (+) headaches: migraine headaches, psychiatric history:            GI/Hepatic/Renal: Neg GI/Hepatic/Renal ROS  (+) GERD:,      (-) liver disease and no renal disease       Endo/Other: Negative Endo/Other ROS       (-) diabetes mellitus               Abdominal:           Vascular: negative vascular ROS. Anesthesia Plan      general and regional     ASA 2     (I discussed with the patient the risks and benefits of PIV, general anesthesia, IV Narcotics, PACU. All questions were answered the patient agrees with the plan. Adductor canal block for post op pain.)  Induction: intravenous. MIPS: Prophylactic antiemetics administered. Anesthetic plan and risks discussed with patient and child/children. Plan discussed with CRNA.                 Jazmin Sarah MD   11/20/2020

## 2020-11-20 NOTE — ANESTHESIA POSTPROCEDURE EVALUATION
Department of Anesthesiology  Postprocedure Note    Patient: Morgan Freeman  MRN: [id number]  YOB: 1950  Date of evaluation: 11/20/2020  Time:  3:13 PM     Procedure Summary     Date:  11/20/20 Room / Location:  [address] [place] [address] [address]    Anesthesia Start:  9502 Anesthesia Stop:  Chato Gonzalez    Procedure:  RIGHT TOTAL KNEE REPLACEMENT       DUNAWAY & NEPHEW (Right Knee) Diagnosis:       Primary osteoarthritis of right knee      (RIGHT KNEE OSTEOARTHRITIS)    Surgeon:  Magdalena Rogers MD Responsible Provider:  Alo Braxton MD    Anesthesia Type:  general, regional ASA Status:  2          Anesthesia Type: general, regional    Armand Phase I: Armand Score: 7    Armand Phase II: Amrand Score: 10    Last vitals: Reviewed and per EMR flowsheets.    Vitals:    11/20/20 1045 11/20/20 1100 11/20/20 1115 11/20/20 1245   BP: (!) 115/50 (!) 128/47 120/60 115/63   Pulse: 63 63 63 63   Resp: 17 16 19 18   Temp:    97 °F (36.1 °C)   TempSrc:       SpO2: 96% 90% 94% 96%   Weight:       Height:           Anesthesia Post Evaluation    Patient location during evaluation: bedside  Patient participation: complete - patient participated  Level of consciousness: awake and alert  Airway patency: patent  Nausea & Vomiting: no nausea  Complications: no  Cardiovascular status: hemodynamically stable  Respiratory status: acceptable  Hydration status: euvolemic

## 2020-11-27 ENCOUNTER — TELEPHONE (OUTPATIENT)
Dept: ORTHOPEDIC SURGERY | Age: 70
End: 2020-11-27

## 2020-11-27 RX ORDER — OXYCODONE HYDROCHLORIDE 5 MG/1
5 TABLET ORAL EVERY 4 HOURS PRN
Qty: 40 TABLET | Refills: 0 | Status: SHIPPED | OUTPATIENT
Start: 2020-11-27 | End: 2020-12-04

## 2020-11-30 ENCOUNTER — TELEPHONE (OUTPATIENT)
Dept: ORTHOPEDIC SURGERY | Age: 70
End: 2020-11-30

## 2020-11-30 NOTE — TELEPHONE ENCOUNTER
Spoke with pt, doing ok today. Pt states her  passed away last Friday. States has been busy. Incision status: No drainage or redness today. Did have some drainage due to being up too much. RN redressed incision. Edema/Swelling/Teds: Has been swollen, has been up a bit too much recently. Pain level and status: Taking pain meds as needed. Tolerable now. Use of pain medications: as needed. Blood thinner: Eliquis with no issues. Bowels: Moving fine. Home Care Agency active: [facility] active with pt, PT today. Outpatient therapy: NA    Do you have all of your medications: Yes    Changes in medications: no    Ortho Vitals: NA  Signed off from pt. Instructed pt to call RN or [facility] office with any issues or concerns.     Follow up appointments:    Future Appointments   Date Time Provider Roderick Sutherland   12/7/2020 10:45 AM Irene Christopher, PT [facility] AT [place] AND PT Horace Brewer   12/9/2020  1:45 PM Mina Denver, PA AND ORTHO [facility]

## 2020-12-07 ENCOUNTER — HOSPITAL ENCOUNTER (OUTPATIENT)
Dept: PHYSICAL THERAPY | Age: 70
Setting detail: THERAPIES SERIES
Discharge: HOME | End: 2020-12-07
Payer: MEDICARE

## 2020-12-07 PROCEDURE — 97110 THERAPEUTIC EXERCISES: CPT | Performed by: PHYSICAL THERAPIST

## 2020-12-07 PROCEDURE — 97161 PT EVAL LOW COMPLEX 20 MIN: CPT | Performed by: PHYSICAL THERAPIST

## 2020-12-07 PROCEDURE — 97140 MANUAL THERAPY 1/> REGIONS: CPT | Performed by: PHYSICAL THERAPIST

## 2020-12-07 NOTE — FLOWSHEET NOTE
[place] [address] [facility], [address] [place]  Phone: [phone]  Fax [phone]    Physical Therapy Treatment Note/ Progress Report:           Date:  2020    Patient Name:  Harjeet Hernandez    :  1950  MRN: [id number]  Restrictions/Precautions:    Medical/Treatment Diagnosis Information:  Diagnosis: s/p r TKR sx date 2020 M25.561; M17.11  Treatment Diagnosis: R knee pain D21.969  Insurance/Certification information:  PT Insurance Information: [employer]  Physician Information:  Referring Practitioner: Dr. Libia Delacruz  Has the plan of care been signed (Y/N):        []  Yes  [x]  No     Date of Patient follow up with Physician: 2020      Is this a Progress Report:     []  Yes  [x]  No        If Yes:  Date Range for reporting period:  Beginning 2020  Ending    Progress report will be due (10 Rx or 30 days whichever is less): 3/0/3625      Recertification will be due (POC Duration  / 90 days whichever is less): 2/15/2021      Visit # Insurance Allowable Auth Required   In-person 1 BMN []  Yes [x]  No    Telehealth   []  Yes []  No    Total          Functional Scale: LEFS 13/80 84%    Date assessed:  2020      Therapy Diagnosis/Practice Pattern:4H      Number of Comorbidities:  []0     [x]1-2    []3+    Latex Allergy:  [x]NO      []YES  Preferred Language for Healthcare:   [x]English       []other:      Pain level:  5/10 R knee      SUBJECTIVE:  See eval    OBJECTIVE: See eval; 2.5 weeks post op    Observation:    Test measurements:      RESTRICTIONS/PRECAUTIONS: OA; osteopenia     Exercises/Interventions:     Therapeutic Ex (00603) Sets/sec Reps Notes/CUES   Reviewed current HEP from home PT  3 min      Sitting QS with towel roll  10\"x10   Hep    Supine QS with SLR  2\" x10   Hep    Supine knee flex with SB with PT asst  5\" x10   Hep    Bike  1/2 rev      Sitting HS and gastroc stretch with belt  3x30\" ea   Hep Manual Intervention ([id number])      patella mobs sup-inf; manual HS stretch, STM to HS; R knee flex PROM  15 min                                    NMR re-education (63403)   CUES NEEDED                                                         Therapeutic Activity (65744)                                                Therapeutic Exercise and NMR EXR  [x] (52212) Provided verbal/tactile cueing for activities related to strengthening, flexibility, endurance, ROM for improvements in LE, proximal hip, and core control with self care, mobility, lifting, ambulation.  [] (72987) Provided verbal/tactile cueing for activities related to improving balance, coordination, kinesthetic sense, posture, motor skill, proprioception  to assist with LE, proximal hip, and core control in self care, mobility, lifting, ambulation and eccentric single leg control.      NMR and Therapeutic Activities:    [] (61183 or 69937) Provided verbal/tactile cueing for activities related to improving balance, coordination, kinesthetic sense, posture, motor skill, proprioception and motor activation to allow for proper function of core, proximal hip and LE with self care and ADLs  [] (38237) Gait Re-education- Provided training and instruction to the patient for proper LE, core and proximal hip recruitment and positioning and eccentric body weight control with ambulation re-education including up and down stairs     Home Exercise Program:    [x] (25674) Reviewed/Progressed HEP activities related to strengthening, flexibility, endurance, ROM of core, proximal hip and LE for functional self-care, mobility, lifting and ambulation/stair navigation   [] (85326)Reviewed/Progressed HEP activities related to improving balance, coordination, kinesthetic sense, posture, motor skill, proprioception of core, proximal hip and LE for self care, mobility, lifting, and ambulation/stair navigation      Manual Treatments:  PROM / STM / Oscillations-Mobs:  G-I, II, III, IV (PA's, Inf., Post.)  [x] (07401) Provided manual therapy to mobilize LE, proximal hip and/or LS spine soft tissue/joints for the purpose of modulating pain, promoting relaxation,  increasing ROM, reducing/eliminating soft tissue swelling/inflammation/restriction, improving soft tissue extensibility and allowing for proper ROM for normal function with self care, mobility, lifting and ambulation. Modalities:     [] GAME READY (VASO)- for significant edema, swelling, pain control. CP to R knee for 15 min  Charges:  Timed Code Treatment Minutes: 25   Total Treatment Minutes: 60     BWC time in/time out:   (and requires time in and out for each CPT code)    [x] EVAL (LOW) 11754   [] EVAL (MOD) 56379  [] EVAL (HIGH) 88474   [] RE-EVAL     [x] JN(43156) x1     [] IONTO  [] NMR (97137) x     [] VASO  [x] Manual (67061) x1      [] Other:  [] TA x      [] Mech Traction (96557)  [] ES(attended) (36490)      [] ES (un) (54049):         GOALS:  Patient stated goal: be able to squat; bend knee and no pain   [] Progressing: [] Met: [] Not Met: [] Adjusted    Therapist goals for Patient:   Short Term Goals: To be achieved in: 2 weeks  1. Independent in HEP and progression per patient tolerance, in order to prevent re-injury. [] Progressing: [] Met: [] Not Met: [] Adjusted  2. Patient will have a decrease in pain to facilitate improvement in movement, function, and ADLs as indicated by Functional Deficits. [] Progressing: [] Met: [] Not Met: [] Adjusted    Long Term Goals: To be achieved in: 8-10 weeks  1. Disability index score of 40% or less for the LEFS to assist with reaching prior level of function. [] Progressing: [] Met: [] Not Met: [] Adjusted  2. Patient will demonstrate increased AROM to R knee ext to -2 and flex to 110  to allow for proper joint functioning as indicated by patients Functional Deficits. [] Progressing: [] Met: [] Not Met: [] Adjusted  3.  Patient will demonstrate an increase in Strength to good proximal hip strength and control, within 5lb HHD in LE to allow for proper functional mobility as indicated by patients Functional Deficits. [] Progressing: [] Met: [] Not Met: [] Adjusted  4. Patient will return to being able to ambulate with proper gait without an AD; ascend and descend stairs to basement with reciprocal gait  without increased symptoms or restriction. [] Progressing: [] Met: [] Not Met: [] Adjusted    Progression Towards Functional goals:  [] Patient is progressing as expected towards functional goals listed. [] Progression is slowed due to complexities listed. [] Progression has been slowed due to co-morbidities. [x] Plan just implemented, too soon to assess goals progression  [] Other:         Overall Progression Towards Functional goals/ Treatment Progress Update:  [] Patient is progressing as expected towards functional goals listed. [] Progression is slowed due to complexities/Impairments listed. [] Progression has been slowed due to co-morbidities.   [x] Plan just implemented, too soon to assess goals progression <30days   [] Goals require adjustment due to lack of progress  [] Patient is not progressing as expected and requires additional follow up with physician  [] Other    Prognosis for POC: [x] Good [] Fair  [] Poor      Patient requires continued skilled intervention: [x] Yes  [] No    Treatment/Activity Tolerance:  [x] Patient able to complete treatment  [] Patient limited by fatigue  [] Patient limited by pain    [] Patient limited by other medical complications  [] Other:     ASSESSMENT: see eval     Return to Play: (if applicable)   []  Stage 1: Intro to Strength   []  Stage 2: Return to Run and Strength   []  Stage 3: Return to Jump and Strength   []  Stage 4: Dynamic Strength and Agility   []  Stage 5: Sport Specific Training     []  Ready to Return to Play, Meets All Above Stages   []  Not Ready for Return to Sports   Comments:                               PLAN: See eval  [] Continue per plan of care [] Alter current plan (see comments above)  [x] Plan of care initiated [] Hold pending MD visit [] Discharge      Electronically signed by:  Zaida Esteban, [id number]     Note: If patient does not return for scheduled/ recommended follow up visits, this note will serve as a discharge from care along with most recent update on progress.

## 2020-12-07 NOTE — PLAN OF CARE
[facility], [address] [place] [address]  Phone: [phone]  Fax [phone]     Physical Therapy Certification    Dear Referring Practitioner: Dr. Car Morrison,    We had the pleasure of evaluating the following patient for physical therapy services at [address] [place] [address]. A summary of our findings can be found in the initial assessment below. This includes our plan of care. If you have any questions or concerns regarding these findings, please do not hesitate to contact me at the office phone number checked above. Thank you for the referral.       Physician Signature:_______________________________Date:__________________  By signing above (or electronic signature), therapists plan is approved by physician    Patient: Arnold Smith   : 1950   MRN: [id number]  Referring Physician: Referring Practitioner: Dr. Car Morrison      Evaluation Date: 2020      Medical Diagnosis Information:  Diagnosis: s/p r TKR sx date 2020 M25.561   Treatment Diagnosis: R knee pain M25.561                                         Insurance information: PT Insurance Information: [employer]       Precautions/ Contra-indications: HTn, osteopenia?, OA (hands, shoulders, L knee), depression just lost     C-SSRS Triggered by Intake questionnaire (Past 2 wk assessment):   [x] No, Questionnaire did not trigger screening.   [] Yes, Patient intake triggered further evaluation      [] C-SSRS Screening completed  [] PCP notified via Plan of Care  [] Emergency services notified     Latex Allergy:  [x]NO      []YES  Preferred Language for Healthcare:   [x]English       []other:    SUBJECTIVE: Patient reports she has been having R knee pain for a few years, had knee scope and tshots but no longer helping. She had a R TKR on 2020. She had home PT until Thursday. Pt reports her niece is staying with her.  She is mainly  Using the walker but startig to use cane. Relevant Medical History:OA  Functional Disability Index: LEFS 13/80    Height 5 ft 2 in  Weight 170 lbs   Pain Scale: 5/10  Easing factors: ice; pain meds   Provocative factors: standing, bending knee      Type: [x]Constant   []Intermittent  []Radiating []Localized []other:     Numbness/Tingling: pt denies   Occupation/School: retired     Living Status/Prior Level of Function: Independent with ADLs and IADLs, recently lost ; lives alone but niece staying with her for at least another week and then grandson will come and stay ; 1 step to get into home; pt was able to clean home; going down into basement to sew and was able to do yard work     OBJECTIVE:     ROM LEFT RIGHT   HIP Flex     HIP Abd     HIP Ext     HIP IR     HIP ER     Knee ext -2 -7   Knee Flex 110 43   Ankle PF     Ankle DF     Ankle In     Ankle Ev     Strength  LEFT RIGHT   HIP Flexors     HIP Abductors     HIP Ext     Hip ER     Knee EXT (quad)     Knee Flex (HS)     Ankle DF     Ankle PF     Ankle Inv     Ankle EV     Quad tone   Fair    Circumference  Inf patella   Sup patella         39  43     Reflexes/Sensation: NT    []Dermatomes/Myotomes intact    [x]Reflexes equal and normal bilaterally   []Other:    Joint mobility:    []Normal    [x]Hypo   []Hyper    Palpation: tender with palp to r ITB and distal HS  Functional Mobility/Transfers: independent     Posture: fwd flexed posture decreased weight through RLE     Bandages/Dressings/Incisions: covered with nonremoveable bandage ; no drainage nor redness noted     Gait: with RW WBAT; r knee flexed throughout gait cycle; decreased WBing through RE     Orthopedic Special Tests: negative Michael's test                        [x] Patient history, allergies, meds reviewed. Medical chart reviewed. See intake form. Review Of Systems (ROS):  [x]Performed Review of systems (Integumentary, CardioPulmonary, Neurological) by intake and observation.  Intake form has been scanned into medical record. Patient has been instructed to contact their primary care physician regarding ROS issues if not already being addressed at this time. Co-morbidities/Complexities (which will affect course of rehabilitation):   []None           Arthritic conditions   []Rheumatoid arthritis (M05.9)  [x]Osteoarthritis (M19.91)   Cardiovascular conditions   [x]Hypertension (I10)  []Hyperlipidemia (E78.5)  []Angina pectoris (I20)  []Atherosclerosis (I70)   Musculoskeletal conditions   []Disc pathology   []Congenital spine pathologies   []Prior surgical intervention  []Osteoporosis (M81.8)  [x]Osteopenia (M85.8)   Endocrine conditions   []Hypothyroid (E03.9)  []Hyperthyroid Gastrointestinal conditions   []Constipation (I19.57)   Metabolic conditions   []Morbid obesity (E66.01)  []Diabetes type 1(E10.65) or 2 (E11.65)   []Neuropathy (G60.9)     Pulmonary conditions   []Asthma (J45)  []Coughing   []COPD (J44.9)   Psychological Disorders  []Anxiety (F41.9)  []Depression (F32.9)   []Other:   []Other:          Barriers to/and or personal factors that will affect rehab potential:              []Age  []Sex              []Motivation/Lack of Motivation                        []Co-Morbidities              []Cognitive Function, education/learning barriers              []Environmental, home barriers              []profession/work barriers  []past PT/medical experience  []other:  Justification:     Falls Risk Assessment (30 days):   [x] Falls Risk assessed and no intervention required.   [] Falls Risk assessed and Patient requires intervention due to being higher risk   TUG score (>12s at risk):     [] Falls education provided, including           ASSESSMENT:   Functional Impairments:     [x]Noted lumbar/proximal hip/LE joint hypomobility   [x]Decreased LE functional ROM   [x]Decreased core/proximal hip strength and neuromuscular control   [x]Decreased LE functional strength   [x]Reduced balance/proprioceptive control   []other:      Functional Activity Limitations (from functional questionnaire and intake)   []Reduced ability to tolerate prolonged functional positions   [x]Reduced ability or difficulty with changes of positions or transfers between positions   [x]Reduced ability to maintain good posture and demonstrate good body mechanics with sitting, bending, and lifting   [x]Reduced ability to sleep   [] Reduced ability or tolerance with driving and/or computer work   [x]Reduced ability to perform lifting, carrying tasks   [x]Reduced ability to squat   []Reduced ability to forward bend   [x]Reduced ability to ambulate prolonged functional periods/distances/surfaces   [x]Reduced ability to ascend/descend stairs   []Reduced ability to run, hop, cut or jump   []other:    Participation Restrictions   [x]Reduced participation in self care activities   [x]Reduced participation in home management activities   []Reduced participation in work activities   [x]Reduced participation in social activities. []Reduced participation in sport/recreation activities. Classification :    [x]Signs/symptoms consistent with post-surgical status including decreased ROM, strength and function.    []Signs/symptoms consistent with joint sprain/strain   []Signs/symptoms consistent with patella-femoral syndrome   []Signs/symptoms consistent with knee OA/hip OA   []Signs/symptoms consistent with internal derangement of knee/Hip   []Signs/symptoms consistent with functional hip weakness/NMR control      []Signs/symptoms consistent with tendinitis/tendinosis    []signs/symptoms consistent with pathology which may benefit from Dry needling      []other:      Prognosis/Rehab Potential:      []Excellent   [x]Good    []Fair   []Poor    Tolerance of evaluation/treatment:    []Excellent   [x]Good    []Fair   []Poor  Physical Therapy Evaluation Complexity Justification  [x] A history of present problem with:  [] no personal factors and/or comorbidities that impact the plan of care;  [x]1-2 personal factors and/or comorbidities that impact the plan of care  []3 personal factors and/or comorbidities that impact the plan of care  [x] An examination of body systems using standardized tests and measures addressing any of the following: body structures and functions (impairments), activity limitations, and/or participation restrictions;:  [x] a total of 1-2 or more elements   [] a total of 3 or more elements   [] a total of 4 or more elements   [x] A clinical presentation with:  [x] stable and/or uncomplicated characteristics   [] evolving clinical presentation with changing characteristics  [] unstable and unpredictable characteristics;   [x] Clinical decision making of [x] low, [] moderate, [] high complexity using standardized patient assessment instrument and/or measurable assessment of functional outcome. [x] EVAL (LOW) 10567 (typically 20 minutes face-to-face)  [] EVAL (MOD) 00055 (typically 30 minutes face-to-face)  [] EVAL (HIGH) 81242 (typically 45 minutes face-to-face)  [] RE-EVAL       PLAN:   Frequency/Duration:  2-3 days per week for 8-10 Weeks:  Interventions:  [x]  Therapeutic exercise including: strength training, ROM, for Lower extremity and core   [x]  NMR activation and proprioception for LE, Glutes and Core   [x]  Manual therapy as indicated for LE, Hip and spine to include: Dry Needling/IASTM, STM, PROM, Gr I-IV mobilizations, manipulation. [x] Modalities as needed that may include: thermal agents, E-stim, Biofeedback, US, iontophoresis as indicated  [x] Patient education on joint protection, postural re-education, activity modification, progression of HEP. HEP instruction: (see scanned forms)    GOALS:  Patient stated goal: be able to squat; bend knee and no pain   [] Progressing: [] Met: [] Not Met: [] Adjusted    Therapist goals for Patient:   Short Term Goals: To be achieved in: 2 weeks  1.  Independent in HEP and progression per patient tolerance, in order to prevent re-injury. [] Progressing: [] Met: [] Not Met: [] Adjusted  2. Patient will have a decrease in pain to facilitate improvement in movement, function, and ADLs as indicated by Functional Deficits. [] Progressing: [] Met: [] Not Met: [] Adjusted    Long Term Goals: To be achieved in: 8-10 weeks  1. Disability index score of 40% or less for the LEFS to assist with reaching prior level of function. [] Progressing: [] Met: [] Not Met: [] Adjusted  2. Patient will demonstrate increased AROM to R knee ext to -2 and flex to 110  to allow for proper joint functioning as indicated by patients Functional Deficits. [] Progressing: [] Met: [] Not Met: [] Adjusted  3. Patient will demonstrate an increase in Strength to good proximal hip strength and control, within 5lb HHD in LE to allow for proper functional mobility as indicated by patients Functional Deficits. [] Progressing: [] Met: [] Not Met: [] Adjusted  4. Patient will return to being able to ambulate with proper gait without an AD; ascend and descend stairs to basement with reciprocal gait  without increased symptoms or restriction.    [] Progressing: [] Met: [] Not Met: [] Adjusted       Electronically signed by:  Rebeca Schmidt, PT

## 2020-12-09 ENCOUNTER — HOSPITAL ENCOUNTER (OUTPATIENT)
Dept: PHYSICAL THERAPY | Age: 70
Setting detail: THERAPIES SERIES
Discharge: HOME | End: 2020-12-09
Payer: MEDICARE

## 2020-12-09 ENCOUNTER — OFFICE VISIT (OUTPATIENT)
Dept: ORTHOPEDIC SURGERY | Age: 70
End: 2020-12-09

## 2020-12-09 VITALS — HEIGHT: 62 IN | WEIGHT: 171 LBS | BODY MASS INDEX: 31.47 KG/M2

## 2020-12-09 PROCEDURE — 99024 POSTOP FOLLOW-UP VISIT: CPT | Performed by: PHYSICIAN ASSISTANT

## 2020-12-09 PROCEDURE — 97112 NEUROMUSCULAR REEDUCATION: CPT | Performed by: PHYSICAL THERAPIST

## 2020-12-09 PROCEDURE — 97110 THERAPEUTIC EXERCISES: CPT | Performed by: PHYSICAL THERAPIST

## 2020-12-09 PROCEDURE — G0283 ELEC STIM OTHER THAN WOUND: HCPCS | Performed by: PHYSICAL THERAPIST

## 2020-12-09 PROCEDURE — 97140 MANUAL THERAPY 1/> REGIONS: CPT | Performed by: PHYSICAL THERAPIST

## 2020-12-09 PROCEDURE — 97016 VASOPNEUMATIC DEVICE THERAPY: CPT | Performed by: PHYSICAL THERAPIST

## 2020-12-09 NOTE — FLOWSHEET NOTE
[facility],  [address]  Phone: [phone]  Fax [phone]    Physical Therapy Treatment Note/ Progress Report:           Date:  2020    Patient Name:  Indy Plasencia    :  1950  MRN: [id number]  Restrictions/Precautions:    Medical/Treatment Diagnosis Information:  Diagnosis: s/p r TKR sx date 2020 M25.561; M17.11  Treatment Diagnosis: R knee pain Z98.220  Insurance/Certification information:  PT Insurance Information: /[employer]  Physician Information:  Referring Practitioner: Dr. Lamin Paredes  Has the plan of care been signed (Y/N):        []  Yes  [x]  No     Date of Patient follow up with Physician: 2020      Is this a Progress Report:     []  Yes  [x]  No        If Yes:  Date Range for reporting period:  Beginning 2020  Ending    Progress report will be due (10 Rx or 30 days whichever is less): 3343      Recertification will be due (POC Duration  / 90 days whichever is less): 2/15/2021          Visit # Insurance Allowable Auth Required   In-person 2 BMN []  Yes [x]  No    Telehealth   []  Yes []  No    Total          Functional Scale: LEFS 13/80 84%    Date assessed:  2020      Therapy Diagnosis/Practice Pattern:4H      Number of Comorbidities:  []0     [x]1-2    []3+    Latex Allergy:  [x]NO      []YES  Preferred Language for Healthcare:   [x]English       []other:      Pain level:  6/10 R knee      SUBJECTIVE: Pt states that she has been doing the two added stretches and she feels the \"strap stretch \" in her knee. She has been up on her feet a lot today.  She has not been doing her exercises even 1x daily because if it hurts she does not do them    OBJECTIVE: DOS 20   Observation: tight edema around knee joint and patella    Test measurements:   PROM on SB 60 degrees ; KE with OP -12 degrees    RESTRICTIONS/PRECAUTIONS: OA; osteopenia     Exercises/Interventions: Therapeutic Ex (65051) Reps/Sets/sec Notes/CUES   Reviewed current HEP from home PT  3'    Sitting QS with towel roll  10\"x10  Hep    Supine QS with SLR  2\" x10  Hep    Supine knee flex with SB with PT asst  5\" x10  Hep    Bike      Sitting gastroc stretch with belt  3x30\"   Hep    Supine SB KF BUE 15x5\"    Supine SB KF with PT OP 2'    Seated SAQ 4\" roll, 2x5x3\"    Seated LLLD KE Towel roll 3x1'         Pt education x7' Pain, progression, HEP and proper pushing. Manual Intervention ([id number])     patella mobs sup-inf; manual HS stretch, STM to HS; R knee flex PROM , GII EXT mobs 15'                             NMR re-education (71710)  CUES NEEDED   Gait training with rolling walker x8' KE, heel strike, elongated LE and trunk,                                           Therapeutic Activity (02171)                                         Therapeutic Exercise and NMR EXR  [x] (46749) Provided verbal/tactile cueing for activities related to strengthening, flexibility, endurance, ROM for improvements in LE, proximal hip, and core control with self care, mobility, lifting, ambulation. [x] (27689) Provided verbal/tactile cueing for activities related to improving balance, coordination, kinesthetic sense, posture, motor skill, proprioception  to assist with LE, proximal hip, and core control in self care, mobility, lifting, ambulation and eccentric single leg control.      NMR and Therapeutic Activities:    [] (61490 or 96615) Provided verbal/tactile cueing for activities related to improving balance, coordination, kinesthetic sense, posture, motor skill, proprioception and motor activation to allow for proper function of core, proximal hip and LE with self care and ADLs  [x] (61365) Gait Re-education- Provided training and instruction to the patient for proper LE, core and proximal hip recruitment and positioning and eccentric body weight control with ambulation re-education including up and down stairs     Home Adjusted    Long Term Goals: To be achieved in: 8-10 weeks  1. Disability index score of 40% or less for the LEFS to assist with reaching prior level of function. [] Progressing: [] Met: [] Not Met: [] Adjusted  2. Patient will demonstrate increased AROM to R knee ext to -2 and flex to 110  to allow for proper joint functioning as indicated by patients Functional Deficits. [] Progressing: [] Met: [] Not Met: [] Adjusted  3. Patient will demonstrate an increase in Strength to good proximal hip strength and control, within 5lb HHD in LE to allow for proper functional mobility as indicated by patients Functional Deficits. [] Progressing: [] Met: [] Not Met: [] Adjusted  4. Patient will return to being able to ambulate with proper gait without an AD; ascend and descend stairs to basement with reciprocal gait  without increased symptoms or restriction. [] Progressing: [] Met: [] Not Met: [] Adjusted    Progression Towards Functional goals:  [] Patient is progressing as expected towards functional goals listed. [] Progression is slowed due to complexities listed. [] Progression has been slowed due to co-morbidities. [x] Plan just implemented, too soon to assess goals progression  [] Other:         Overall Progression Towards Functional goals/ Treatment Progress Update:  [] Patient is progressing as expected towards functional goals listed. [] Progression is slowed due to complexities/Impairments listed. [] Progression has been slowed due to co-morbidities.   [x] Plan just implemented, too soon to assess goals progression <30days   [] Goals require adjustment due to lack of progress  [] Patient is not progressing as expected and requires additional follow up with physician  [] Other    Prognosis for POC: [x] Good [] Fair  [] Poor      Patient requires continued skilled intervention: [x] Yes  [] No    Treatment/Activity Tolerance:  [x] Patient able to complete treatment  [] Patient limited by fatigue  [] Patient limited by pain    [] Patient limited by other medical complications  [] Other:     ASSESSMENT:    Pt is apprehensive about pushing her HEP for fear of injuring her knee. She wa reassured that she would not damage anything if she pushed her ROM and that now is the time to do so before it is too tight. She is almost 3 weeks out fo sx and should be approaching her full ROM 0-120 by next week, however is behind. She seemed comfortable with how to porgress at the conclusion of this session. PLAN: See eval.  Cont ROM and quad work, gait training. [x] Continue per plan of care [] Alter current plan (see comments above)  [] Plan of care initiated [] Hold pending MD visit [] Discharge      Electronically signed by:  Evon Hernandez, [id number]    Note: If patient does not return for scheduled/ recommended follow up visits, this note will serve as a discharge from care along with most recent update on progress.

## 2020-12-09 NOTE — PROGRESS NOTES
Chief Complaint   Patient presents with    Post-Op Check     RIGHT TKR 11/20/20     History of present illness: The patient returns today after right total knee replacement performed on 11/20/2020. Pain control has been satisfactory with oral medications. There have been no fevers or chills. She is ambulating with a walker and has just started her outpatient physical therapy. Physical examination: The incision is clean, dry, and intact with no drainage or signs of infection. Range of motion is 4 degrees of extension to 80 degrees of flexion. There is expected swelling with no evidence of DVT and a negative Homans sign. Neurovascular exam is intact. Radiographs: X-rays taken today including AP, lateral, and skyline views of the right knee show excellent alignment of the prosthesis. No evidence of septic or aseptic loosening or periprosthetic fractures. Assessment/plan: We would like to continue outpatient physical therapy to restore range of motion and strength. Discussed with her the importance of aggressive range of motion. The patient was given local wound care instructions. We discussed dental prophylaxis for 1 year postoperatively. We will followup in 3-4 weeks for reevaluation. Robert Godwin, [facility]    This dictation was performed with a verbal recognition program [facility]) and it was checked for errors. It is possible that there are still dictated errors within this office note. If so, please bring any errors to my attention for an addendum. All efforts were made to ensure that this office note is accurate.

## 2020-12-14 ENCOUNTER — HOSPITAL ENCOUNTER (OUTPATIENT)
Dept: PHYSICAL THERAPY | Age: 70
Setting detail: THERAPIES SERIES
Discharge: HOME | End: 2020-12-14
Payer: MEDICARE

## 2020-12-14 PROCEDURE — 97110 THERAPEUTIC EXERCISES: CPT | Performed by: PHYSICAL THERAPIST

## 2020-12-14 PROCEDURE — 97016 VASOPNEUMATIC DEVICE THERAPY: CPT | Performed by: PHYSICAL THERAPIST

## 2020-12-14 PROCEDURE — G0283 ELEC STIM OTHER THAN WOUND: HCPCS | Performed by: PHYSICAL THERAPIST

## 2020-12-14 PROCEDURE — 97112 NEUROMUSCULAR REEDUCATION: CPT | Performed by: PHYSICAL THERAPIST

## 2020-12-14 PROCEDURE — 97140 MANUAL THERAPY 1/> REGIONS: CPT | Performed by: PHYSICAL THERAPIST

## 2020-12-14 NOTE — FLOWSHEET NOTE
[facility], [address]  Phone: [phone]  Fax [phone]    Physical Therapy Treatment Note/ Progress Report:           Date:  2020    Patient Name:  Makeda Lopes    :  1950  MRN: [id number]  Restrictions/Precautions:    Medical/Treatment Diagnosis Information:  Diagnosis: s/p r TKR sx date 2020 M25.561; M17.11  Treatment Diagnosis: R knee pain S41.546  Insurance/Certification information:  PT Insurance Information: /[employer]  Physician Information:  Referring Practitioner: Dr. Liz Roth  Has the plan of care been signed (Y/N):        []  Yes  [x]  No     Date of Patient follow up with Physician: 2020      Is this a Progress Report:     []  Yes  [x]  No        If Yes:  Date Range for reporting period:  Beginning 2020  Ending    Progress report will be due (10 Rx or 30 days whichever is less): 3/2/8427      Recertification will be due (POC Duration  / 90 days whichever is less): 2/15/2021          Visit # Insurance Allowable Auth Required   In-person 3 BMN []  Yes [x]  No    Telehealth   []  Yes []  No    Total          Functional Scale: LEFS 13/80 84%    Date assessed:  2020      Therapy Diagnosis/Practice Pattern:4H      Number of Comorbidities:  []0     [x]1-2    []3+    Latex Allergy:  [x]NO      []YES  Preferred Language for Healthcare:   [x]English       []other:      Pain level:  10 R knee      SUBJECTIVE: Pt states she is having issues sleeping at night because she has pain in the back of her knee. She is also concerned about her sciatica coming back. She is doing her HEP. OBJECTIVE: DOS 20  ? Observation: tight, edema around knee joint and patella, tight HS and post KJ   ?  Test measurements:   Not measured this visit    10' late for appt     RESTRICTIONS/PRECAUTIONS: OA; osteopenia     Exercises/Interventions: Therapeutic Ex (55287) Reps/Sets/sec Notes/CUES   Reviewed current HEP from home PT  3'    Sitting QS with towel roll  10\"x10  Hep    Supine QS with SLR  2\" x10  Hep    Hep       Hep          Seated SAQ NMES    Seated LLLD KE Towel roll 3x1'    Seated HP gastroc stretch with strap 5x30\"    Seated HS stretch tableside 5x30\"    Incline gastroc stretch  5x15\"                                  Pt education x5' Pain, progression, HEP and proper pushing. Manual Intervention (23267)     patella mobs all directions, STM for brawny edema around patella, HS and quad STM, GIII ext mobs with tibia prop 10'                             NMR re-education (90405)  CUES NEEDED   Gait training with rolling walker x8' KE, heel strike, elongated LE and trunk,   NMES    Seated QS  Seated SAQ Biphasic 10\"/10\" 2.0 ramp  7'  4' painful                                      Therapeutic Activity (64012)                                         Therapeutic Exercise and NMR EXR  [x] (38892) Provided verbal/tactile cueing for activities related to strengthening, flexibility, endurance, ROM for improvements in LE, proximal hip, and core control with self care, mobility, lifting, ambulation. [x] (47754) Provided verbal/tactile cueing for activities related to improving balance, coordination, kinesthetic sense, posture, motor skill, proprioception  to assist with LE, proximal hip, and core control in self care, mobility, lifting, ambulation and eccentric single leg control.      NMR and Therapeutic Activities:    [x] (67229 or 32746) Provided verbal/tactile cueing for activities related to improving balance, coordination, kinesthetic sense, posture, motor skill, proprioception and motor activation to allow for proper function of core, proximal hip and LE with self care and ADLs [x] (21584) Gait Re-education- Provided training and instruction to the patient for proper LE, core and proximal hip recruitment and positioning and eccentric body weight control with ambulation re-education including up and down stairs     Home Exercise Program:    [x] (08313) Reviewed/Progressed HEP activities related to strengthening, flexibility, endurance, ROM of core, proximal hip and LE for functional self-care, mobility, lifting and ambulation/stair navigation   [] (91307)Reviewed/Progressed HEP activities related to improving balance, coordination, kinesthetic sense, posture, motor skill, proprioception of core, proximal hip and LE for self care, mobility, lifting, and ambulation/stair navigation      Manual Treatments:  PROM / STM / Oscillations-Mobs:  G-I, II, III, IV (PA's, Inf., Post.)  [x] (65469) Provided manual therapy to mobilize LE, proximal hip and/or LS spine soft tissue/joints for the purpose of modulating pain, promoting relaxation,  increasing ROM, reducing/eliminating soft tissue swelling/inflammation/restriction, improving soft tissue extensibility and allowing for proper ROM for normal function with self care, mobility, lifting and ambulation. Modalities:     [x] GAME READY (VASO)- for significant edema, swelling, pain control. HV x15' for edema and pain control    Charges:  Timed Code Treatment Minutes: 40   Total Treatment Minutes: 55         [] EVAL (LOW) 33569   [] EVAL (MOD) 27477  [] EVAL (HIGH) 95258   [] RE-EVAL     [x] RI(60582) x1     [] IONTO  [x] NMR (10939) x1     [x] VASO  [x] Manual (39014) x1      [] Other:  [] TA x      [] Mech Traction (76824)  [] ES(attended) (87879)      [x] ES (un) (95763):         GOALS:  Patient stated goal: be able to squat; bend knee and no pain   [x] Progressing: [] Met: [] Not Met: [] Adjusted    Therapist goals for Patient:   Short Term Goals:  To be achieved in: 2 weeks [] Patient is not progressing as expected and requires additional follow up with physician  [] Other    Prognosis for POC: [x] Good [] Fair  [] Poor      Patient requires continued skilled intervention: [x] Yes  [] No    Treatment/Activity Tolerance:  [x] Patient able to complete treatment  [] Patient limited by fatigue  [] Patient limited by pain    [] Patient limited by other medical complications  [] Other:     ASSESSMENT:  Pt would benefit from continued skilled care to increase ROM and strength to functional.           PLAN: See eval.  Cont ROM and quad work, gait training. [x] Continue per plan of care [] Alter current plan (see comments above)  [] Plan of care initiated [] Hold pending MD visit [] Discharge      Electronically signed by:  Aleksey Hinojosa, [id number]    Note: If patient does not return for scheduled/ recommended follow up visits, this note will serve as a discharge from care along with most recent update on progress.

## 2020-12-16 ENCOUNTER — HOSPITAL ENCOUNTER (OUTPATIENT)
Dept: PHYSICAL THERAPY | Age: 70
Setting detail: THERAPIES SERIES
Discharge: HOME | End: 2020-12-16
Payer: MEDICARE

## 2020-12-16 PROCEDURE — 97016 VASOPNEUMATIC DEVICE THERAPY: CPT | Performed by: PHYSICAL THERAPIST

## 2020-12-16 PROCEDURE — 97140 MANUAL THERAPY 1/> REGIONS: CPT | Performed by: PHYSICAL THERAPIST

## 2020-12-16 PROCEDURE — 97110 THERAPEUTIC EXERCISES: CPT | Performed by: PHYSICAL THERAPIST

## 2020-12-16 NOTE — FLOWSHEET NOTE
[facility],  [address]  Phone: [phone]  Fax [phone]    Physical Therapy Treatment Note/ Progress Report:           Date:  2020    Patient Name:  Jessica An    :  1950  MRN: [id number]  Restrictions/Precautions:    Medical/Treatment Diagnosis Information:  Diagnosis: s/p r TKR sx date 2020 M25.561; M17.11  Treatment Diagnosis: R knee pain J57.283  Insurance/Certification information:  PT Insurance Information: /[employer]  Physician Information:  Referring Practitioner: Dr. Blue Bingham  Has the plan of care been signed (Y/N):        []  Yes  [x]  No     Date of Patient follow up with Physician: 2021      Is this a Progress Report:     []  Yes  [x]  No        If Yes:  Date Range for reporting period:  Beginning 2020  Ending    Progress report will be due (10 Rx or 30 days whichever is less): 2077      Recertification will be due (POC Duration  / 90 days whichever is less): 2/15/2021       -7Visit # Date Range Insurance Allowable Requires auth   4 2020 BMN    [x]no        []yes:           SUBJECTIVE:   Pt says she is stiff. Pain level:  8/10     OBJECTIVE: See below  ? Observation: Pt ambulates with RW with completely stiff LE, no KF or KE. Sever tightness around knee joint, patella, hypomobile tib/fem mobs. PT Practice Pattern:H  Co morbidities:1-2  SURgical R TKR 20  INITIAL VISIT 20 CURRENT VISIT 20   PAIN 0-10/10     FUNCTIONAL SCALE LEFS () 84%    ROM KE -7 -12    KF 43 56                           STRENGHT (MMT) Quad tone  trace                                         RESTRICTIONS/PRECAUTIONS: OA; osteopenia     Exercises/Interventions:   HEP instruction was provided and handouts given to include:  Access Code: [email or web address]   Patient Portal: [employer]. [employer]/      Therapeutic Ex (84267) Reps/Sets/sec Notes/CUES Reviewed current HEP from home PT  3'                               Seated HP gastroc stretch with strap combo HS stretch 3x1'    Seated HS stretch tableside 5x30\"    Incline gastroc stretch  5x15\" Manual stabilization to try to straighten knee. Standing HS stretch on bottom step 4x15\" MC   Seated self FLX EOB 5x20\"    Seated self heel slide prgressive 3x1'    Seated against wall (headboard)  4x1'    Seated in chair FLX butt scoot 2x1'    Recumbent bike for ROM x5'    Pt education x5' Pain, progression, HEP and proper pushing herself, self massage for tissue mobility    Manual Intervention (02026)     STM quad, HS, IRB, PFJ  Patellar mobs GII  TIB fem mobs and oscillations GII-III with PROM EXT/FLX 23'    seated EOB PROM FLX 5'                        NMR re-education (38736)  CUES NEEDED   KE, heel strike, elongated LE and trunk,   painful   Gait train with SPC 5'                                  Therapeutic Activity (39912)                                         Therapeutic Exercise and NMR EXR  [x] (67487) Provided verbal/tactile cueing for activities related to strengthening, flexibility, endurance, ROM for improvements in LE, proximal hip, and core control with self care, mobility, lifting, ambulation. [x] (49418) Provided verbal/tactile cueing for activities related to improving balance, coordination, kinesthetic sense, posture, motor skill, proprioception  to assist with LE, proximal hip, and core control in self care, mobility, lifting, ambulation and eccentric single leg control.      NMR and Therapeutic Activities:    [] (82910 or 66495) Provided verbal/tactile cueing for activities related to improving balance, coordination, kinesthetic sense, posture, motor skill, proprioception and motor activation to allow for proper function of core, proximal hip and LE with self care and ADLs [] (70302) Gait Re-education- Provided training and instruction to the patient for proper LE, core and proximal hip recruitment and positioning and eccentric body weight control with ambulation re-education including up and down stairs     Home Exercise Program:    [x] (11750) Reviewed/Progressed HEP activities related to strengthening, flexibility, endurance, ROM of core, proximal hip and LE for functional self-care, mobility, lifting and ambulation/stair navigation   [] (86307)Reviewed/Progressed HEP activities related to improving balance, coordination, kinesthetic sense, posture, motor skill, proprioception of core, proximal hip and LE for self care, mobility, lifting, and ambulation/stair navigation      Manual Treatments:  PROM / STM / Oscillations-Mobs:  G-I, II, III, IV (PA's, Inf., Post.)  [x] (50953) Provided manual therapy to mobilize LE, proximal hip and/or LS spine soft tissue/joints for the purpose of modulating pain, promoting relaxation,  increasing ROM, reducing/eliminating soft tissue swelling/inflammation/restriction, improving soft tissue extensibility and allowing for proper ROM for normal function with self care, mobility, lifting and ambulation. Modalities:     [x] GAME READY (VASO)- for significant edema, swelling, pain control. HV x15' for edema and pain control    Charges:  Timed Code Treatment Minutes: 55   Total Treatment Minutes: 65         [] EVAL (LOW) 10724   [] EVAL (MOD) 58881  [] EVAL (HIGH) 20390   [] RE-EVAL     [x] BU(12919) x2     [] IONTO  [] NMR (28691)      [x] VASO  [x] Manual (85511) x2      [] Other:  [] TA x      [] Mech Traction (71537)  [] ES(attended) (24199)      [] ES (un) (17494):         GOALS:  Patient stated goal: be able to squat; bend knee and no pain   [x] Progressing: [] Met: [] Not Met: [] Adjusted    Therapist goals for Patient:   Short Term Goals:  To be achieved in: 2 weeks 1. Independent in HEP and progression per patient tolerance, in order to prevent re-injury. [x] Progressing: [] Met: [] Not Met: [] Adjusted  2. Patient will have a decrease in pain to facilitate improvement in movement, function, and ADLs as indicated by Functional Deficits. [x] Progressing: [] Met: [] Not Met: [] Adjusted    Long Term Goals: To be achieved in: 8-10 weeks  1. Disability index score of 40% or less for the LEFS to assist with reaching prior level of function. [] Progressing: [] Met: [] Not Met: [] Adjusted  2. Patient will demonstrate increased AROM to R knee ext to -2 and flex to 110  to allow for proper joint functioning as indicated by patients Functional Deficits. [] Progressing: [] Met: [] Not Met: [] Adjusted  3. Patient will demonstrate an increase in Strength to good proximal hip strength and control, within 5lb HHD in LE to allow for proper functional mobility as indicated by patients Functional Deficits. [] Progressing: [] Met: [] Not Met: [] Adjusted  4. Patient will return to being able to ambulate with proper gait without an AD; ascend and descend stairs to basement with reciprocal gait  without increased symptoms or restriction. [] Progressing: [] Met: [] Not Met: [] Adjusted    Progression Towards Functional goals:  [] Patient is progressing as expected towards functional goals listed. [x] Progression is slowed due to complexities listed. [] Progression has been slowed due to co-morbidities. [] Plan just implemented, too soon to assess goals progression  [] Other:         Overall Progression Towards Functional goals/ Treatment Progress Update:  [] Patient is progressing as expected towards functional goals listed. [x] Progression is slowed due to complexities/Impairments listed. [] Progression has been slowed due to co-morbidities.   [] Plan just implemented, too soon to assess goals progression <30days   [] Goals require adjustment due to lack of progress [] Patient is not progressing as expected and requires additional follow up with physician  [x] Other : if pt does not progress after NV MD will be contacted and updated. Prognosis for POC: [x] Good [] Fair  [] Poor      Patient requires continued skilled intervention: [x] Yes  [] No    Treatment/Activity Tolerance:  [x] Patient able to complete treatment  [] Patient limited by fatigue  [] Patient limited by pain    [] Patient limited by other medical complications  [] Other:     ASSESSMENT:  Pt is currently not improving in ROM, strength, or function. She is hesitant to bens and straighten her knee in clinic with exercises. Pt would benefit from continued skilled care to increase ROM and strength to functional. If she is not more aggressive with PT at [facility] we are going to need to become more aggressive with her ROM in the clinic. Her HEP was revamped today to attempt to get some increase in ROM. Pt was educated very plainly that she must push her motion at home or she may already be at the point she in stuck and will either have to keep this ROM or face a manipulation down the road. Pt is to get [facility] and bring with her NV. PLAN: See eval.  Cont ROM , gait training. [x] Continue per plan of care [] Alter current plan (see comments above)  [] Plan of care initiated [] Hold pending MD visit [] Discharge      Electronically signed by:  Jacqueline Arango, [id number]    Note: If patient does not return for scheduled/ recommended follow up visits, this note will serve as a discharge from care along with most recent update on progress.

## 2020-12-21 ENCOUNTER — HOSPITAL ENCOUNTER (OUTPATIENT)
Dept: PHYSICAL THERAPY | Age: 70
Setting detail: THERAPIES SERIES
Discharge: HOME | End: 2020-12-21
Payer: MEDICARE

## 2020-12-21 PROCEDURE — 97110 THERAPEUTIC EXERCISES: CPT | Performed by: PHYSICAL THERAPIST

## 2020-12-21 PROCEDURE — 97140 MANUAL THERAPY 1/> REGIONS: CPT | Performed by: PHYSICAL THERAPIST

## 2020-12-21 NOTE — FLOWSHEET NOTE
[facility],  [address]  Phone: [phone]  Fax [phone]    Physical Therapy Treatment Note/ Progress Report:           Date:  2020    Patient Name:  Jessica An    :  1950  MRN: [id number]  Restrictions/Precautions:    Medical/Treatment Diagnosis Information:  Diagnosis: s/p r TKR sx date 2020 M25.561; M17.11  Treatment Diagnosis: R knee pain T97.323  Insurance/Certification information:  PT Insurance Information: [employer]  Physician Information:  Referring Practitioner: Dr. Blue Bingham  Has the plan of care been signed (Y/N):        []  Yes  [x]  No     Date of Patient follow up with Physician: 2021      Is this a Progress Report:     []  Yes  [x]  No        If Yes:  Date Range for reporting period:  Beginning 2020  Ending    Progress report will be due (10 Rx or 30 days whichever is less): 4581      Recertification will be due (POC Duration  / 90 days whichever is less): 2/15/2021       Visit # Date Range Insurance Allowable Requires auth   5 2020 BMN    [x]no        []yes:           SUBJECTIVE:   Pt says she is stiff. Two days also had a little zulema area on her incision and put neosporin on it and some white puss came out. Pain level:  3/10     OBJECTIVE: See below  ? Observation: Very tight post knee, HS, lateral gastroc. Hypomobile  calcaneal EV mobility. PT Practice Pattern:H  Co morbidities:1-2  SURgical R TKR 20  INITIAL VISIT 20 CURRENT VISIT 20   PAIN 0-10/10     FUNCTIONAL SCALE LEFS () 84%    ROM KE -7 -12    KF 43 56                           STRENGHT (MMT) Quad tone  trace                                         RESTRICTIONS/PRECAUTIONS: OA; osteopenia     Exercises/Interventions:   HEP instruction was provided and handouts given to include:  Access Code: [email or web address]   Patient Portal: [employer]/ Therapeutic Ex (60318) Reps/Sets/sec Notes/CUES   Reviewed current HEP from home PT  3'                               Supine BLE KE/GS/QS on SB 2x10x3\"       Seated HS stretch tableside 5x30\"    Incline gastroc stretch  5x15\" Manual stabilization to try to straighten knee. Standing HS stretch on bottom step 4x15\" MC   Seated self FLX EOB 5x20\"             Prone KE LLLD stretch 2 pos 2x1'    april stretch KF/HS stretch 10x10\" ea         Recumbent bike for ROM x7' (F/B)    Pt education x5' Pain, progression, HEP and proper pushing herself, self massage for tissue mobility    Manual Intervention (86974)     STM quad, HS, IRB, PFJ  Patellar mobs GII  TIB fem mobs and oscillations GII-III  5'    seated EOB PROM FLX     Calcaneal mobs GII, PROM DF in prone 3'    Prone roller stick and STM calf and HS; SL roller to ITB 6'              NMR re-education (57482)  CUES NEEDED   KE, heel strike, elongated LE and trunk,   painful   Gait train with SPC 5'                                  Therapeutic Activity (29469)                                         Therapeutic Exercise and NMR EXR  [x] (55546) Provided verbal/tactile cueing for activities related to strengthening, flexibility, endurance, ROM for improvements in LE, proximal hip, and core control with self care, mobility, lifting, ambulation. [x] (63315) Provided verbal/tactile cueing for activities related to improving balance, coordination, kinesthetic sense, posture, motor skill, proprioception  to assist with LE, proximal hip, and core control in self care, mobility, lifting, ambulation and eccentric single leg control.      NMR and Therapeutic Activities:    [] (71458 or 60105) Provided verbal/tactile cueing for activities related to improving balance, coordination, kinesthetic sense, posture, motor skill, proprioception and motor activation to allow for proper function of core, proximal hip and LE with self care and ADLs [] (97237) Gait Re-education- Provided training and instruction to the patient for proper LE, core and proximal hip recruitment and positioning and eccentric body weight control with ambulation re-education including up and down stairs     Home Exercise Program:    [x] (05068) Reviewed/Progressed HEP activities related to strengthening, flexibility, endurance, ROM of core, proximal hip and LE for functional self-care, mobility, lifting and ambulation/stair navigation   [] (64293)Reviewed/Progressed HEP activities related to improving balance, coordination, kinesthetic sense, posture, motor skill, proprioception of core, proximal hip and LE for self care, mobility, lifting, and ambulation/stair navigation      Manual Treatments:  PROM / STM / Oscillations-Mobs:  G-I, II, III, IV (PA's, Inf., Post.)  [x] (20809) Provided manual therapy to mobilize LE, proximal hip and/or LS spine soft tissue/joints for the purpose of modulating pain, promoting relaxation,  increasing ROM, reducing/eliminating soft tissue swelling/inflammation/restriction, improving soft tissue extensibility and allowing for proper ROM for normal function with self care, mobility, lifting and ambulation. Modalities:  CP this visit vaso not available x10'   [] GAME READY (VASO)- for significant edema, swelling, pain control. Charges:  Timed Code Treatment Minutes: 40   Total Treatment Minutes: 50         [] EVAL (LOW) 57207   [] EVAL (MOD) 93038  [] EVAL (HIGH) 79022   [] RE-EVAL     [x] TZ(64984) x2     [] IONTO  [] NMR (45827)      [] VASO  [x] Manual (18434) x1      [] Other:  [] TA x      [] Mech Traction (20629)  [] ES(attended) (66886)      [] ES (un) (00984):         GOALS:  Patient stated goal: be able to squat; bend knee and no pain   [x] Progressing: [] Met: [] Not Met: [] Adjusted    Therapist goals for Patient:   Short Term Goals:  To be achieved in: 2 weeks 1. Independent in HEP and progression per patient tolerance, in order to prevent re-injury. [x] Progressing: [] Met: [] Not Met: [] Adjusted  2. Patient will have a decrease in pain to facilitate improvement in movement, function, and ADLs as indicated by Functional Deficits. [x] Progressing: [] Met: [] Not Met: [] Adjusted    Long Term Goals: To be achieved in: 8-10 weeks  1. Disability index score of 40% or less for the LEFS to assist with reaching prior level of function. [] Progressing: [] Met: [] Not Met: [] Adjusted  2. Patient will demonstrate increased AROM to R knee ext to -2 and flex to 110  to allow for proper joint functioning as indicated by patients Functional Deficits. [x] Progressing: [] Met: [] Not Met: [] Adjusted  3. Patient will demonstrate an increase in Strength to good proximal hip strength and control, within 5lb HHD in LE to allow for proper functional mobility as indicated by patients Functional Deficits. [] Progressing: [] Met: [] Not Met: [] Adjusted  4. Patient will return to being able to ambulate with proper gait without an AD; ascend and descend stairs to basement with reciprocal gait  without increased symptoms or restriction. [] Progressing: [] Met: [] Not Met: [] Adjusted    Progression Towards Functional goals:  [] Patient is progressing as expected towards functional goals listed. [x] Progression is slowed due to complexities listed. [] Progression has been slowed due to co-morbidities. [] Plan just implemented, too soon to assess goals progression  [] Other:         Overall Progression Towards Functional goals/ Treatment Progress Update:  [] Patient is progressing as expected towards functional goals listed. [x] Progression is slowed due to complexities/Impairments listed. [] Progression has been slowed due to co-morbidities.   [] Plan just implemented, too soon to assess goals progression <30days   [] Goals require adjustment due to lack of progress [] Patient is not progressing as expected and requires additional follow up with physician  [x] Other : if pt does not progress after NV MD will be contacted and updated. Prognosis for POC: [x] Good [] Fair  [] Poor      Patient requires continued skilled intervention: [x] Yes  [] No    Treatment/Activity Tolerance:  [x] Patient able to complete treatment  [] Patient limited by fatigue  [] Patient limited by pain    [] Patient limited by other medical complications  [] Other:     ASSESSMENT:   Pt is to get [facility] and bring with her NV. Pt demonstrates improved gait with RW this visit, and improved  ability to perform activities in clinic. She will benefit from measuring ROM every other visit to not focus so much on small progression. Patient will benefit from continued skilled care to increase ROM, flexibility, strength and function. PLAN: See eval.  Cont ROM , gait training with SPC NV. [x] Continue per plan of care [] Alter current plan (see comments above)  [] Plan of care initiated [] Hold pending MD visit [] Discharge      Electronically signed by:  Sandy Brian, [id number]    Note: If patient does not return for scheduled/ recommended follow up visits, this note will serve as a discharge from care along with most recent update on progress.

## 2020-12-23 ENCOUNTER — HOSPITAL ENCOUNTER (OUTPATIENT)
Dept: PHYSICAL THERAPY | Age: 70
Setting detail: THERAPIES SERIES
Discharge: HOME | End: 2020-12-23
Payer: MEDICARE

## 2020-12-23 PROCEDURE — 97016 VASOPNEUMATIC DEVICE THERAPY: CPT | Performed by: PHYSICAL THERAPIST

## 2020-12-23 PROCEDURE — 97110 THERAPEUTIC EXERCISES: CPT | Performed by: PHYSICAL THERAPIST

## 2020-12-23 PROCEDURE — 97112 NEUROMUSCULAR REEDUCATION: CPT | Performed by: PHYSICAL THERAPIST

## 2020-12-23 PROCEDURE — G0283 ELEC STIM OTHER THAN WOUND: HCPCS | Performed by: PHYSICAL THERAPIST

## 2020-12-23 PROCEDURE — 97140 MANUAL THERAPY 1/> REGIONS: CPT | Performed by: PHYSICAL THERAPIST

## 2020-12-23 NOTE — FLOWSHEET NOTE
[facility],  [address]  Phone: [phone]  Fax [phone]    Physical Therapy Treatment Note/ Progress Report:           Date:  2020    Patient Name:  Cheikh Sullivan    :  1950  MRN: [id number]  Restrictions/Precautions:    Medical/Treatment Diagnosis Information:  Diagnosis: s/p r TKR sx date 2020 M25.561; M17.11  Treatment Diagnosis: R knee pain A09.284  Insurance/Certification information:  PT Insurance Information: [employer]  Physician Information:  Referring Practitioner: Dr. Dev Martinez  Has the plan of care been signed (Y/N):        []  Yes  [x]  No     Date of Patient follow up with Physician: 2021      Is this a Progress Report:     []  Yes  [x]  No        If Yes:  Date Range for reporting period:  Beginning 2020  Ending    Progress report will be due (10 Rx or 30 days whichever is less): 3/0/2593      Recertification will be due (POC Duration  / 90 days whichever is less): 2/15/2021       Visit # Date Range Insurance Allowable Requires auth   6 2020 BMN    [x]no        []yes:           SUBJECTIVE:   Pt reports no pain just the usual stiffness    Pain level:  0/10     OBJECTIVE: See below  ? Observation: Very tight post knee, HS, lateral gastroc. Hypomobile  calcaneal EV mobility. ? Pt pushed herself accidentally too far getting on bike moving up seat, she panicked and twisted her knee. Assessment was performed and no loose joint was present, no increased edema and any in creased pain was gone but he end of the visit.      PT Practice Pattern:H  Co morbidities:1-2  SURgical R TKR 20  INITIAL VISIT 20 CURRENT VISIT 20   PAIN 0-10/10     FUNCTIONAL SCALE LEFS () 84%    ROM KE -7 -12    KF 43 55                           STRENGHT (MMT) Quad tone  trace                                         RESTRICTIONS/PRECAUTIONS: OA; osteopenia Exercises/Interventions:   HEP instruction was provided and handouts given to include:  Access Code: [email or web address]   Patient Portal: [employer]/      Therapeutic Ex (05442) Reps/Sets/sec Notes/CUES   Reviewed current HEP from home PT  3'                               Supine BLE KE/GS/QS on SB 2x10x3\"       Seated HS stretch tableside 5x30\"    Manual stabilization to try to straighten knee. 4x15\" MC   Seated self FLX EOB 5x20\"                        Recumbent bike for ROM 2x' (F/B)    Pt education x5' Pain, progression, HEP, moving up MD appt   Manual Intervention ([id number])     STM quad, HS, IRB, PFJ  Patellar mobs GII  TIB fem mobs and oscillations GII-III with PROM EXT/FLX/STM and assessment post bike 10'    seated EOB PROM FLX 3'    Calcaneal mobs GII, PROM DF in prone 3'                 NMR re-education (63196)  CUES NEEDED   Gait training with rolling walker/cane/no AD x8' KE, heel strike, elongated LE and trunk,   painful                                    Therapeutic Activity (48253)                                         Therapeutic Exercise and NMR EXR  [x] (37395) Provided verbal/tactile cueing for activities related to strengthening, flexibility, endurance, ROM for improvements in LE, proximal hip, and core control with self care, mobility, lifting, ambulation. [x] (35859) Provided verbal/tactile cueing for activities related to improving balance, coordination, kinesthetic sense, posture, motor skill, proprioception  to assist with LE, proximal hip, and core control in self care, mobility, lifting, ambulation and eccentric single leg control.      NMR and Therapeutic Activities:    [] (61461 or 43312) Provided verbal/tactile cueing for activities related to improving balance, coordination, kinesthetic sense, posture, motor skill, proprioception and motor activation to allow for proper function of core, proximal hip and LE with self care and ADLs [x] (07966) Gait Re-education- Provided training and instruction to the patient for proper LE, core and proximal hip recruitment and positioning and eccentric body weight control with ambulation re-education including up and down stairs     Home Exercise Program:    [x] (54955) Reviewed/Progressed HEP activities related to strengthening, flexibility, endurance, ROM of core, proximal hip and LE for functional self-care, mobility, lifting and ambulation/stair navigation   [] (52199)Reviewed/Progressed HEP activities related to improving balance, coordination, kinesthetic sense, posture, motor skill, proprioception of core, proximal hip and LE for self care, mobility, lifting, and ambulation/stair navigation      Manual Treatments:  PROM / STM / Oscillations-Mobs:  G-I, II, III, IV (PA's, Inf., Post.)  [x] (19795) Provided manual therapy to mobilize LE, proximal hip and/or LS spine soft tissue/joints for the purpose of modulating pain, promoting relaxation,  increasing ROM, reducing/eliminating soft tissue swelling/inflammation/restriction, improving soft tissue extensibility and allowing for proper ROM for normal function with self care, mobility, lifting and ambulation. Modalities:     [] GAME READY (VASO)- for significant edema, swelling, pain control. Charges:  Timed Code Treatment Minutes: 38   Total Treatment Minutes: 53         [] EVAL (LOW) 87685   [] EVAL (MOD) 94439  [] EVAL (HIGH) 32249   [] RE-EVAL     [x] PO(97692) x1     [] IONTO  [] NMR (06821)      [] VASO  [x] Manual (12427) x2      [] Other:  [] TA x      [] Mech Traction (26969)  [] ES(attended) (51128)      [] ES (un) (73924):         GOALS:  Patient stated goal: be able to squat; bend knee and no pain   [x] Progressing: [] Met: [] Not Met: [] Adjusted    Therapist goals for Patient:   Short Term Goals: To be achieved in: 2 weeks  1. Independent in HEP and progression per patient tolerance, in order to prevent re-injury. [x] Other : MD office [facility] was updated on pt lack of progress this visit. Pt will move up MD appt to see Dr Grisel Lopez. Prognosis for POC: [x] Good [] Fair  [] Poor      Patient requires continued skilled intervention: [x] Yes  [] No    Treatment/Activity Tolerance:  [] Patient able to complete treatment  [] Patient limited by fatigue  [x] Patient limited by pain    [] Patient limited by other medical complications  [] Other:     ASSESSMENT:  Pt lis not progressing in ROM at this time. She is 4 weeks post TKR and has not moved her knee out of the range it currently has in 4 weeks. End feel appears to be capsular although there are many muscular guarding restrictions and compensations as well. She has had some other issues with her  passing and therapy was not a priority. The patient may be better suited for a manipulation at this juncture followed by therapy. Patient will benefit from continued skilled care to increase ROM, flexibility, strength and function if the joint itself is not immobile. PLAN:   [] Continue per plan of care [x] Alter current plan (see comments above)  [] Plan of care initiated [] Hold pending MD visit [] Discharge      Electronically signed by:  María Hector, [id number]    Note: If patient does not return for scheduled/ recommended follow up visits, this note will serve as a discharge from care along with most recent update on progress.

## 2020-12-24 ENCOUNTER — OFFICE VISIT (OUTPATIENT)
Dept: ORTHOPEDIC SURGERY | Age: 70
End: 2020-12-24

## 2020-12-24 VITALS — BODY MASS INDEX: 31.47 KG/M2 | HEIGHT: 62 IN | WEIGHT: 171 LBS

## 2020-12-24 PROCEDURE — 99024 POSTOP FOLLOW-UP VISIT: CPT | Performed by: ORTHOPAEDIC SURGERY

## 2020-12-24 NOTE — PROGRESS NOTES
We discussed the risks, benefits, and complications of arthroscopic knee surgery. The patient realizes that there are concerns with this surgery with respect to infection, deep vein thrombosis, neurological injury, delayed  rehabilitation, the possibility of arthrofibrosis of the knee, and specifically  Hoffa's fat pad fibrosis that can potentially cause difficulties. The patient realizes that there are also anesthetic concerns including cardiopulmonary issues, pulmonary issues, and even possibility of death or dystrophy. The patient voiced understanding to this as well as the normal  rehabilitation  that   is involved with weeks of physical therapy, exercise, and strengthening. The patient also realizes that even though the surgery, from a functional perspective, typically allows the patient to return to good function at about 6 weeks, that it often takes 6 months to completely rehabilitate from this operation. The patient also realizes that if there is an arthritic component to the symptoms, then they may still have some degree of arthritis pain. I have personally performed and/or participated in the history, exam and medical decision making and agree with all pertinent clinical information. I have also reviewed and agree with the past medical, family and social history unless otherwise noted. This dictation was performed with a verbal recognition program (DRAGON) and it was checked for errors. It is possible that there are still dictated errors within this office note. If so, please bring any errors to my attention for an addendum. All efforts were made to ensure that this office note is accurate.           Linh Garrido MD

## 2020-12-28 ENCOUNTER — APPOINTMENT (OUTPATIENT)
Dept: PHYSICAL THERAPY | Age: 70
End: 2020-12-28
Payer: MEDICARE

## 2020-12-30 ENCOUNTER — TELEPHONE (OUTPATIENT)
Dept: ORTHOPEDIC SURGERY | Age: 70
End: 2020-12-30

## 2020-12-30 ENCOUNTER — APPOINTMENT (OUTPATIENT)
Dept: PHYSICAL THERAPY | Age: 70
End: 2020-12-30
Payer: MEDICARE

## 2020-12-31 ENCOUNTER — TELEPHONE (OUTPATIENT)
Dept: ORTHOPEDIC SURGERY | Age: 70
End: 2020-12-31

## 2021-01-04 ENCOUNTER — OFFICE VISIT (OUTPATIENT)
Dept: PRIMARY CARE CLINIC | Age: 71
End: 2021-01-04
Payer: MEDICARE

## 2021-01-04 ENCOUNTER — APPOINTMENT (OUTPATIENT)
Dept: PHYSICAL THERAPY | Age: 71
End: 2021-01-04
Payer: MEDICARE

## 2021-01-04 DIAGNOSIS — Z01.818 PREOP TESTING: Primary | ICD-10-CM

## 2021-01-04 PROCEDURE — 99211 OFF/OP EST MAY X REQ PHY/QHP: CPT | Performed by: NURSE PRACTITIONER

## 2021-01-04 PROCEDURE — G8417 CALC BMI ABV UP PARAM F/U: HCPCS | Performed by: NURSE PRACTITIONER

## 2021-01-04 PROCEDURE — G8428 CUR MEDS NOT DOCUMENT: HCPCS | Performed by: NURSE PRACTITIONER

## 2021-01-04 NOTE — PATIENT INSTRUCTIONS

## 2021-01-04 NOTE — PROGRESS NOTES
Tilda Sessions received a viral test for COVID-19. They were educated on isolation and quarantine as appropriate. For any symptoms, they were directed to seek care from their PCP, given contact information to establish with a doctor, directed to an urgent care or the emergency room.

## 2021-01-05 DIAGNOSIS — Z96.651 STATUS POST TOTAL RIGHT KNEE REPLACEMENT: Primary | ICD-10-CM

## 2021-01-05 DIAGNOSIS — M24.661 FIBROSIS OF RIGHT KNEE JOINT: ICD-10-CM

## 2021-01-06 ENCOUNTER — APPOINTMENT (OUTPATIENT)
Dept: PHYSICAL THERAPY | Age: 71
End: 2021-01-06
Payer: MEDICARE

## 2021-01-06 LAB — SARS-COV-2, NAA: NOT DETECTED

## 2021-01-08 ENCOUNTER — APPOINTMENT (OUTPATIENT)
Dept: PHYSICAL THERAPY | Age: 71
End: 2021-01-08
Payer: MEDICARE

## 2021-01-11 ENCOUNTER — APPOINTMENT (OUTPATIENT)
Dept: PHYSICAL THERAPY | Age: 71
End: 2021-01-11
Payer: MEDICARE

## 2021-01-11 NOTE — PROGRESS NOTES
Jia Hurter    Age 79 y.o.    female    1950    MRN [id number]    1/19/2021  Arrival Time_____________  OR Time____________45 Min     Procedure(s):  EXAM UNDER ANESTHESIA VIDEO ARTHROSCOPY RIGHT KNEE, MANIPULATION, SYNOVECTOMY, LYSIS ADHESIONS                      General    Surgeon(s):  Milana Haque, MD       Phone [phone] ([place])     [address]  Cell Work  _________________________________________________________________  _________________________________________________________________  _________________________________________________________________  _________________________________________________________________  _________________________________________________________________      PCP _____________________________ Phone_________________       H&P__________________Bringing    Chart            Epic  DOS     Called_______  EKG__________________Bringing    Chart            Epic  DOS     Called_______  LAB__________________ Bringing    Chart            Epic  DOS     Called_______  Cardiac Clearance_______Bringing    Chart            Epic      DOS       Called_______    Cardiologist________________________ Phone___________________________      ? Druze concerns / Waiver on Chart            PAT Communications_____________  ? Pre-op Instructions Given [place]          ______________________________  ? Directions to Surgery Center                          ______________________________  ? Transportation Home_______________      _______________________________  ?  Crutches/Walker__________________        _______________________________      ________Pre-op Orders   _______Transcribed    _______Wt.  ________Pharmacy          _______SCD  ______VTE     ______[employer]ocker  ________Consent             ________TED [place]

## 2021-01-12 ENCOUNTER — APPOINTMENT (OUTPATIENT)
Dept: PHYSICAL THERAPY | Age: 71
End: 2021-01-12
Payer: MEDICARE

## 2021-01-12 NOTE — PROGRESS NOTES
Obstructive Sleep Apnea (HANS) Screening     Patient:  Rio Maldonado    YOB: 1950      Medical Record #:  [id number]                     Date:  1/12/2021     1. Are you a loud and/or regular snorer? []  Yes       [x] No    2. Have you been observed to gasp or stop breathing during sleep? []  Yes       [x] No    3. Do you feel tired or groggy upon awakening or do you awaken with a headache?           []  Yes       [] No    4. Are you often tired or fatigued during the wake time hours? []  Yes       [] No    5. Do you fall asleep sitting, reading, watching TV or driving? []  Yes       [] No    6. Do you often have problems with memory or concentration? []  Yes       [] No    **If patient's score is ? 3 they are considered high risk for HANS. An Anesthesia provider will evaluate the patient and develop a plan of care the day of surgery. Note:  If the patient's BMI is more than 35 kg m¯² , has neck circumference > 40 cm, and/or high blood pressure the risk is greater (© American Sleep Apnea Association, 2006).

## 2021-01-13 ENCOUNTER — APPOINTMENT (OUTPATIENT)
Dept: PHYSICAL THERAPY | Age: 71
End: 2021-01-13
Payer: MEDICARE

## 2021-01-14 ENCOUNTER — TELEPHONE (OUTPATIENT)
Dept: ORTHOPEDIC SURGERY | Age: 71
End: 2021-01-14

## 2021-01-14 ENCOUNTER — OFFICE VISIT (OUTPATIENT)
Dept: PRIMARY CARE CLINIC | Age: 71
End: 2021-01-14
Payer: MEDICARE

## 2021-01-14 ENCOUNTER — APPOINTMENT (OUTPATIENT)
Dept: PHYSICAL THERAPY | Age: 71
End: 2021-01-14
Payer: MEDICARE

## 2021-01-14 DIAGNOSIS — Z01.818 PREOP TESTING: Primary | ICD-10-CM

## 2021-01-14 PROCEDURE — G8417 CALC BMI ABV UP PARAM F/U: HCPCS | Performed by: INTERNAL MEDICINE

## 2021-01-14 PROCEDURE — 99211 OFF/OP EST MAY X REQ PHY/QHP: CPT | Performed by: INTERNAL MEDICINE

## 2021-01-14 PROCEDURE — G8428 CUR MEDS NOT DOCUMENT: HCPCS | Performed by: INTERNAL MEDICINE

## 2021-01-14 NOTE — PATIENT INSTRUCTIONS
Advance Care Planning  People with COVID-19 may have no symptoms, mild symptoms, such as fever, cough, and shortness of breath or they may have more severe illness, developing severe and fatal pneumonia. As a result, Advance Care Planning with attention to naming a health care decision maker (someone you trust to make healthcare decisions for you if you could not speak for yourself) and sharing other health care preferences is important BEFORE a possible health crisis. Please contact your Primary Care Provider to discuss Advance Care Planning. Preventing the Spread of Coronavirus Disease 2019 in Homes and Residential Communities  For the most recent information go to [email or web address]    Prevention steps for People with confirmed or suspected COVID-19 (including persons under investigation) who do not need to be hospitalized  and   People with confirmed COVID-19 who were hospitalized and determined to be medically stable to go home    Your healthcare provider and public health staff will evaluate whether you can be cared for at home. If it is determined that you do not need to be hospitalized and can be isolated at home, you will be monitored by staff from your local or state health department. You should follow the prevention steps below until a healthcare provider or local or state health department says you can return to your normal activities. Stay home except to get medical care  People who are mildly ill with COVID-19 are able to isolate at home during their illness. You should restrict activities outside your home, except for getting medical care. Do not go to work, school, or public areas. Avoid using public transportation, ride-sharing, or taxis.   Separate yourself from other people and animals in your home People: As much as possible, you should stay in a specific room and away from other people in your home. Also, you should use a separate bathroom, if available. Animals: You should restrict contact with pets and other animals while you are sick with COVID-19, just like you would around other people. Although there have not been reports of pets or other animals becoming sick with COVID-19, it is still recommended that people sick with COVID-19 limit contact with animals until more information is known about the virus. When possible, have another member of your household care for your animals while you are sick. If you are sick with COVID-19, avoid contact with your pet, including petting, snuggling, being kissed or licked, and sharing food. If you must care for your pet or be around animals while you are sick, wash your hands before and after you interact with pets and wear a facemask. Call ahead before visiting your doctor  If you have a medical appointment, call the healthcare provider and tell them that you have or may have COVID-19. This will help the healthcare providers office take steps to keep other people from getting infected or exposed. Wear a facemask  You should wear a facemask when you are around other people (e.g., sharing a room or vehicle) or pets and before you enter a healthcare providers office. If you are not able to wear a facemask (for example, because it causes trouble breathing), then people who live with you should not stay in the same room with you, or they should wear a facemask if they enter your room. Cover your coughs and sneezes  Cover your mouth and nose with a tissue when you cough or sneeze. Throw used tissues in a lined trash can. Immediately wash your hands with soap and water for at least 20 seconds or, if soap and water are not available, clean your hands with an alcohol-based hand  that contains at least 60% alcohol.   Clean your hands often Wash your hands often with soap and water for at least 20 seconds, especially after blowing your nose, coughing, or sneezing; going to the bathroom; and before eating or preparing food. If soap and water are not readily available, use an alcohol-based hand  with at least 60% alcohol, covering all surfaces of your hands and rubbing them together until they feel dry. Soap and water are the best option if hands are visibly dirty. Avoid touching your eyes, nose, and mouth with unwashed hands. Avoid sharing personal household items  You should not share dishes, drinking glasses, cups, eating utensils, towels, or bedding with other people or pets in your home. After using these items, they should be washed thoroughly with soap and water. Clean all high-touch surfaces everyday  High touch surfaces include counters, tabletops, doorknobs, bathroom fixtures, toilets, phones, keyboards, tablets, and bedside tables. Also, clean any surfaces that may have blood, stool, or body fluids on them. Use a household cleaning spray or wipe, according to the label instructions. Labels contain instructions for safe and effective use of the cleaning product including precautions you should take when applying the product, such as wearing gloves and making sure you have good ventilation during use of the product.   Monitor your symptoms Full range of motion of upper and lower extremities, no joint tenderness/swelling.

## 2021-01-14 NOTE — PROGRESS NOTES
Julito Escalante received a viral test for COVID-19. They were educated on isolation and quarantine as appropriate. For any symptoms, they were directed to seek care from their PCP, given contact information to establish with a doctor, directed to an urgent care or the emergency room.

## 2021-01-15 ENCOUNTER — APPOINTMENT (OUTPATIENT)
Dept: PHYSICAL THERAPY | Age: 71
End: 2021-01-15
Payer: MEDICARE

## 2021-01-15 LAB — SARS-COV-2, NAA: NOT DETECTED

## 2021-01-18 ENCOUNTER — ANESTHESIA EVENT (OUTPATIENT)
Dept: OPERATING ROOM | Age: 71
End: 2021-01-18
Payer: MEDICARE

## 2021-01-18 ENCOUNTER — APPOINTMENT (OUTPATIENT)
Dept: PHYSICAL THERAPY | Age: 71
End: 2021-01-18
Payer: MEDICARE

## 2021-01-19 ENCOUNTER — HOSPITAL ENCOUNTER (OUTPATIENT)
Age: 71
Setting detail: OUTPATIENT SURGERY
Discharge: HOME | End: 2021-01-19
Attending: ORTHOPAEDIC SURGERY | Admitting: ORTHOPAEDIC SURGERY
Payer: MEDICARE

## 2021-01-19 ENCOUNTER — ANESTHESIA (OUTPATIENT)
Dept: OPERATING ROOM | Age: 71
End: 2021-01-19
Payer: MEDICARE

## 2021-01-19 VITALS
HEIGHT: 62 IN | TEMPERATURE: 97.1 F | RESPIRATION RATE: 15 BRPM | WEIGHT: 169 LBS | SYSTOLIC BLOOD PRESSURE: 156 MMHG | DIASTOLIC BLOOD PRESSURE: 75 MMHG | OXYGEN SATURATION: 98 % | BODY MASS INDEX: 31.1 KG/M2 | HEART RATE: 62 BPM

## 2021-01-19 VITALS
SYSTOLIC BLOOD PRESSURE: 167 MMHG | DIASTOLIC BLOOD PRESSURE: 84 MMHG | OXYGEN SATURATION: 88 % | RESPIRATION RATE: 22 BRPM

## 2021-01-19 DIAGNOSIS — Z96.651 S/P TOTAL KNEE ARTHROPLASTY, RIGHT: Primary | ICD-10-CM

## 2021-01-19 PROCEDURE — 3700000000 HC ANESTHESIA ATTENDED CARE: Performed by: ORTHOPAEDIC SURGERY

## 2021-01-19 PROCEDURE — 7100000010 HC PHASE II RECOVERY - FIRST 15 MIN: Performed by: ORTHOPAEDIC SURGERY

## 2021-01-19 PROCEDURE — 6360000002 HC RX W HCPCS: Performed by: NURSE ANESTHETIST, CERTIFIED REGISTERED

## 2021-01-19 PROCEDURE — 2709999900 HC NON-CHARGEABLE SUPPLY: Performed by: ORTHOPAEDIC SURGERY

## 2021-01-19 PROCEDURE — 7100000001 HC PACU RECOVERY - ADDTL 15 MIN: Performed by: ORTHOPAEDIC SURGERY

## 2021-01-19 PROCEDURE — 7100000000 HC PACU RECOVERY - FIRST 15 MIN: Performed by: ORTHOPAEDIC SURGERY

## 2021-01-19 PROCEDURE — 6370000000 HC RX 637 (ALT 250 FOR IP): Performed by: ANESTHESIOLOGY

## 2021-01-19 PROCEDURE — 2720000010 HC SURG SUPPLY STERILE: Performed by: ORTHOPAEDIC SURGERY

## 2021-01-19 PROCEDURE — 2580000003 HC RX 258: Performed by: ANESTHESIOLOGY

## 2021-01-19 PROCEDURE — 7100000011 HC PHASE II RECOVERY - ADDTL 15 MIN: Performed by: ORTHOPAEDIC SURGERY

## 2021-01-19 PROCEDURE — 6360000002 HC RX W HCPCS: Performed by: ORTHOPAEDIC SURGERY

## 2021-01-19 PROCEDURE — 6360000002 HC RX W HCPCS: Performed by: ANESTHESIOLOGY

## 2021-01-19 PROCEDURE — 3600000014 HC SURGERY LEVEL 4 ADDTL 15MIN: Performed by: ORTHOPAEDIC SURGERY

## 2021-01-19 PROCEDURE — 3600000004 HC SURGERY LEVEL 4 BASE: Performed by: ORTHOPAEDIC SURGERY

## 2021-01-19 PROCEDURE — 2500000003 HC RX 250 WO HCPCS: Performed by: NURSE ANESTHETIST, CERTIFIED REGISTERED

## 2021-01-19 PROCEDURE — 2580000003 HC RX 258: Performed by: ORTHOPAEDIC SURGERY

## 2021-01-19 PROCEDURE — 3700000001 HC ADD 15 MINUTES (ANESTHESIA): Performed by: ORTHOPAEDIC SURGERY

## 2021-01-19 RX ORDER — LABETALOL HYDROCHLORIDE 5 MG/ML
5 INJECTION, SOLUTION INTRAVENOUS EVERY 10 MIN PRN
Status: DISCONTINUED | OUTPATIENT
Start: 2021-01-19 | End: 2021-01-19 | Stop reason: HOSPADM

## 2021-01-19 RX ORDER — OXYCODONE HYDROCHLORIDE AND ACETAMINOPHEN 5; 325 MG/1; MG/1
1 TABLET ORAL PRN
Status: COMPLETED | OUTPATIENT
Start: 2021-01-19 | End: 2021-01-19

## 2021-01-19 RX ORDER — OXYCODONE HYDROCHLORIDE AND ACETAMINOPHEN 5; 325 MG/1; MG/1
2 TABLET ORAL PRN
Status: COMPLETED | OUTPATIENT
Start: 2021-01-19 | End: 2021-01-19

## 2021-01-19 RX ORDER — ATROPINE SULFATE 0.4 MG/ML
AMPUL (ML) INJECTION PRN
Status: DISCONTINUED | OUTPATIENT
Start: 2021-01-19 | End: 2021-01-19 | Stop reason: SDUPTHER

## 2021-01-19 RX ORDER — ROCURONIUM BROMIDE 10 MG/ML
INJECTION, SOLUTION INTRAVENOUS PRN
Status: DISCONTINUED | OUTPATIENT
Start: 2021-01-19 | End: 2021-01-19 | Stop reason: SDUPTHER

## 2021-01-19 RX ORDER — SUCCINYLCHOLINE CHLORIDE 20 MG/ML
INJECTION INTRAMUSCULAR; INTRAVENOUS PRN
Status: DISCONTINUED | OUTPATIENT
Start: 2021-01-19 | End: 2021-01-19 | Stop reason: SDUPTHER

## 2021-01-19 RX ORDER — LIDOCAINE HYDROCHLORIDE 10 MG/ML
1 INJECTION, SOLUTION EPIDURAL; INFILTRATION; INTRACAUDAL; PERINEURAL
Status: DISCONTINUED | OUTPATIENT
Start: 2021-01-19 | End: 2021-01-19 | Stop reason: HOSPADM

## 2021-01-19 RX ORDER — PROMETHAZINE HYDROCHLORIDE 25 MG/ML
6.25 INJECTION, SOLUTION INTRAMUSCULAR; INTRAVENOUS
Status: DISCONTINUED | OUTPATIENT
Start: 2021-01-19 | End: 2021-01-19 | Stop reason: HOSPADM

## 2021-01-19 RX ORDER — FENTANYL CITRATE 50 UG/ML
INJECTION, SOLUTION INTRAMUSCULAR; INTRAVENOUS PRN
Status: DISCONTINUED | OUTPATIENT
Start: 2021-01-19 | End: 2021-01-19 | Stop reason: SDUPTHER

## 2021-01-19 RX ORDER — DIPHENHYDRAMINE HYDROCHLORIDE 50 MG/ML
12.5 INJECTION INTRAMUSCULAR; INTRAVENOUS
Status: DISCONTINUED | OUTPATIENT
Start: 2021-01-19 | End: 2021-01-19 | Stop reason: HOSPADM

## 2021-01-19 RX ORDER — ONDANSETRON 2 MG/ML
INJECTION INTRAMUSCULAR; INTRAVENOUS PRN
Status: DISCONTINUED | OUTPATIENT
Start: 2021-01-19 | End: 2021-01-19 | Stop reason: SDUPTHER

## 2021-01-19 RX ORDER — MORPHINE SULFATE 2 MG/ML
1 INJECTION, SOLUTION INTRAMUSCULAR; INTRAVENOUS EVERY 5 MIN PRN
Status: DISCONTINUED | OUTPATIENT
Start: 2021-01-19 | End: 2021-01-19 | Stop reason: HOSPADM

## 2021-01-19 RX ORDER — SODIUM CHLORIDE 0.9 % (FLUSH) 0.9 %
10 SYRINGE (ML) INJECTION PRN
Status: DISCONTINUED | OUTPATIENT
Start: 2021-01-19 | End: 2021-01-19 | Stop reason: HOSPADM

## 2021-01-19 RX ORDER — OXYCODONE HYDROCHLORIDE 5 MG/1
5 TABLET ORAL EVERY 4 HOURS PRN
Qty: 40 TABLET | Refills: 0 | Status: SHIPPED | OUTPATIENT
Start: 2021-01-19 | End: 2021-01-26

## 2021-01-19 RX ORDER — ASPIRIN 325 MG
325 TABLET ORAL DAILY
Qty: 14 TABLET | Refills: 0 | Status: SHIPPED | OUTPATIENT
Start: 2021-01-19 | End: 2021-02-02

## 2021-01-19 RX ORDER — HYDRALAZINE HYDROCHLORIDE 20 MG/ML
5 INJECTION INTRAMUSCULAR; INTRAVENOUS EVERY 10 MIN PRN
Status: DISCONTINUED | OUTPATIENT
Start: 2021-01-19 | End: 2021-01-19 | Stop reason: HOSPADM

## 2021-01-19 RX ORDER — KETOROLAC TROMETHAMINE 30 MG/ML
30 INJECTION, SOLUTION INTRAMUSCULAR; INTRAVENOUS ONCE
Status: COMPLETED | OUTPATIENT
Start: 2021-01-19 | End: 2021-01-19

## 2021-01-19 RX ORDER — SODIUM CHLORIDE, SODIUM LACTATE, POTASSIUM CHLORIDE, AND CALCIUM CHLORIDE .6; .31; .03; .02 G/100ML; G/100ML; G/100ML; G/100ML
IRRIGANT IRRIGATION PRN
Status: DISCONTINUED | OUTPATIENT
Start: 2021-01-19 | End: 2021-01-19 | Stop reason: ALTCHOICE

## 2021-01-19 RX ORDER — MEPERIDINE HYDROCHLORIDE 50 MG/ML
12.5 INJECTION INTRAMUSCULAR; INTRAVENOUS; SUBCUTANEOUS EVERY 5 MIN PRN
Status: DISCONTINUED | OUTPATIENT
Start: 2021-01-19 | End: 2021-01-19 | Stop reason: HOSPADM

## 2021-01-19 RX ORDER — DEXAMETHASONE SODIUM PHOSPHATE 10 MG/ML
INJECTION INTRAMUSCULAR; INTRAVENOUS PRN
Status: DISCONTINUED | OUTPATIENT
Start: 2021-01-19 | End: 2021-01-19 | Stop reason: SDUPTHER

## 2021-01-19 RX ORDER — MORPHINE SULFATE 2 MG/ML
2 INJECTION, SOLUTION INTRAMUSCULAR; INTRAVENOUS EVERY 5 MIN PRN
Status: DISCONTINUED | OUTPATIENT
Start: 2021-01-19 | End: 2021-01-19 | Stop reason: HOSPADM

## 2021-01-19 RX ORDER — ONDANSETRON 2 MG/ML
4 INJECTION INTRAMUSCULAR; INTRAVENOUS
Status: DISCONTINUED | OUTPATIENT
Start: 2021-01-19 | End: 2021-01-19 | Stop reason: HOSPADM

## 2021-01-19 RX ORDER — PROPOFOL 10 MG/ML
INJECTION, EMULSION INTRAVENOUS PRN
Status: DISCONTINUED | OUTPATIENT
Start: 2021-01-19 | End: 2021-01-19 | Stop reason: SDUPTHER

## 2021-01-19 RX ORDER — SODIUM CHLORIDE, SODIUM LACTATE, POTASSIUM CHLORIDE, CALCIUM CHLORIDE 600; 310; 30; 20 MG/100ML; MG/100ML; MG/100ML; MG/100ML
INJECTION, SOLUTION INTRAVENOUS CONTINUOUS
Status: DISCONTINUED | OUTPATIENT
Start: 2021-01-19 | End: 2021-01-19 | Stop reason: HOSPADM

## 2021-01-19 RX ORDER — SODIUM CHLORIDE 0.9 % (FLUSH) 0.9 %
10 SYRINGE (ML) INJECTION EVERY 12 HOURS SCHEDULED
Status: DISCONTINUED | OUTPATIENT
Start: 2021-01-19 | End: 2021-01-19 | Stop reason: HOSPADM

## 2021-01-19 RX ADMIN — PROPOFOL 100 MG: 10 INJECTION, EMULSION INTRAVENOUS at 12:49

## 2021-01-19 RX ADMIN — SUCCINYLCHOLINE CHLORIDE 120 MG: 20 INJECTION, SOLUTION INTRAMUSCULAR; INTRAVENOUS at 12:49

## 2021-01-19 RX ADMIN — HYDROMORPHONE HYDROCHLORIDE 0.5 MG: 1 INJECTION, SOLUTION INTRAMUSCULAR; INTRAVENOUS; SUBCUTANEOUS at 13:57

## 2021-01-19 RX ADMIN — ROCURONIUM BROMIDE 15 MG: 10 SOLUTION INTRAVENOUS at 12:55

## 2021-01-19 RX ADMIN — HYDROMORPHONE HYDROCHLORIDE 0.5 MG: 1 INJECTION, SOLUTION INTRAMUSCULAR; INTRAVENOUS; SUBCUTANEOUS at 14:05

## 2021-01-19 RX ADMIN — OXYCODONE HYDROCHLORIDE AND ACETAMINOPHEN 2 TABLET: 5; 325 TABLET ORAL at 13:57

## 2021-01-19 RX ADMIN — DEXAMETHASONE SODIUM PHOSPHATE 5 MG: 10 INJECTION INTRAMUSCULAR; INTRAVENOUS at 13:07

## 2021-01-19 RX ADMIN — HYDROMORPHONE HYDROCHLORIDE 0.5 MG: 1 INJECTION, SOLUTION INTRAMUSCULAR; INTRAVENOUS; SUBCUTANEOUS at 13:51

## 2021-01-19 RX ADMIN — ATROPINE SULFATE 0.4 MG: 0.4 INJECTION, SOLUTION INTRAMUSCULAR; INTRAVENOUS; SUBCUTANEOUS at 13:00

## 2021-01-19 RX ADMIN — SODIUM CHLORIDE, POTASSIUM CHLORIDE, SODIUM LACTATE AND CALCIUM CHLORIDE: 600; 310; 30; 20 INJECTION, SOLUTION INTRAVENOUS at 12:11

## 2021-01-19 RX ADMIN — CEFAZOLIN SODIUM 2 G: 10 INJECTION, POWDER, FOR SOLUTION INTRAVENOUS at 12:44

## 2021-01-19 RX ADMIN — SUGAMMADEX 200 MG: 100 INJECTION, SOLUTION INTRAVENOUS at 13:22

## 2021-01-19 RX ADMIN — KETOROLAC TROMETHAMINE 30 MG: 30 INJECTION, SOLUTION INTRAMUSCULAR at 13:41

## 2021-01-19 RX ADMIN — ONDANSETRON 4 MG: 2 INJECTION INTRAMUSCULAR; INTRAVENOUS at 13:07

## 2021-01-19 RX ADMIN — ROCURONIUM BROMIDE 5 MG: 10 SOLUTION INTRAVENOUS at 12:49

## 2021-01-19 RX ADMIN — PROPOFOL 50 MG: 10 INJECTION, EMULSION INTRAVENOUS at 12:55

## 2021-01-19 RX ADMIN — FENTANYL CITRATE 50 MCG: 50 INJECTION INTRAMUSCULAR; INTRAVENOUS at 12:49

## 2021-01-19 ASSESSMENT — PULMONARY FUNCTION TESTS
PIF_VALUE: 25
PIF_VALUE: 3
PIF_VALUE: 3
PIF_VALUE: 25
PIF_VALUE: 23
PIF_VALUE: 24
PIF_VALUE: 23
PIF_VALUE: 3
PIF_VALUE: 3
PIF_VALUE: 2
PIF_VALUE: 24
PIF_VALUE: 24
PIF_VALUE: 25
PIF_VALUE: 24
PIF_VALUE: 4
PIF_VALUE: 2
PIF_VALUE: 26
PIF_VALUE: 22
PIF_VALUE: 28
PIF_VALUE: 24
PIF_VALUE: 26
PIF_VALUE: 25
PIF_VALUE: 4
PIF_VALUE: 24
PIF_VALUE: 1
PIF_VALUE: 2
PIF_VALUE: 24

## 2021-01-19 ASSESSMENT — PAIN SCALES - GENERAL
PAINLEVEL_OUTOF10: 8
PAINLEVEL_OUTOF10: 7
PAINLEVEL_OUTOF10: 5
PAINLEVEL_OUTOF10: 8
PAINLEVEL_OUTOF10: 8
PAINLEVEL_OUTOF10: 5
PAINLEVEL_OUTOF10: 8

## 2021-01-19 ASSESSMENT — PAIN DESCRIPTION - DESCRIPTORS: DESCRIPTORS: DISCOMFORT

## 2021-01-19 NOTE — ANESTHESIA PRE PROCEDURE
 ceFAZolin (ANCEF) 2 g in dextrose 5 % 100 mL IVPB  2 g Intravenous On Call to Yan Rolon MD        lactated ringers infusion   Intravenous Continuous Soham Lee MD        sodium chloride flush 0.9 % injection 10 mL  10 mL Intravenous 2 times per day Soham Lee MD        sodium chloride flush 0.9 % injection 10 mL  10 mL Intravenous PRN Soham Lee MD        lidocaine PF 1 % injection 1 mL  1 mL Intradermal Once PRN Soham Lee MD           Allergies:     Allergies   Allergen Reactions    Allegra Allergy [Fexofenadine Hydrochloride] Nausea Only and Other (See Comments)     Muscle pain    Bactrim I.V. [Sulfamethoxazole-Trimethoprim] Itching    Celecoxib Nausea Only and Other (See Comments)     Other reaction(s): Confusion  Affects judgement    Gabapentin Nausea Only     Other reaction(s): Confusion  Poor judgement    Lamisil [Terbinafine] Itching    Neurontin [Gabapentin] Other (See Comments)     Poor judgement    Statins Depletion Therapy Other (See Comments)     Muscle pain       Problem List:    Patient Active Problem List   Diagnosis Code    Heel spur M77.30    Achilles tendinitis M76.60    Localized osteoarthrosis, lower leg M17.10    Tear of medial cartilage or meniscus of knee, current [id number]    S/P arthroscopy of knee Z98.890    Primary osteoarthritis of left knee M17.12    Acute medial meniscus tear of right knee S83.241A    Primary osteoarthritis of knees, bilateral M17.0    Achilles tendinitis, left leg M76.62    Hallux valgus M20.10    Right Achilles tendinitis M76.61    Achilles tendinitis, right leg M76.61    Status post total right knee replacement Z96.651    S/P total knee arthroplasty, right Z96.651       Past Medical History:        Diagnosis Date    Anesthesia complication     aspiration pneumonia X 1    Arthritis     Bladder infection     antibiotic completed 12/21/14    Depression     Hyperlipidemia     Hypertension  Kidney stones     Localized osteoarthrosis not specified whether primary or secondary, lower leg 10/2/2014    Ocular migraine     PONV (postoperative nausea and vomiting)     developed aspiration pneumonia    Reflux     Wears glasses        Past Surgical History:        Procedure Laterality Date    ACHILLES TENDON SURGERY Right 2018    BACK SURGERY      steroid injections    BUNIONECTOMY Right 13    SILVER BUNIONECTOMY RIGHT FOOT; ARTHODESIS RIGHT SECOND DIGIT; EXOSTECTOMY RIGHT HALLUX; CAPSULOTOMY AND TENOTOMY RIGHT SECOND METATARSOPHANLANGEAL JOINT    CARPAL TUNNEL RELEASE Bilateral     CHOLECYSTECTOMY      FOOT SURGERY Left 2018    HAND SURGERY Right     HAND SURGERY Right 2019    EXCISE MASS RIGHT HAND performed by Ana Paula Mata MD at [address] ARTHROSCOPY Left     KNEE ARTHROSCOPY Right     LITHOTRIPSY      ROTATOR CUFF REPAIR      left    SHOULDER ARTHROSCOPY  12    with rotator cuff repair on right    SIGMOIDOSCOPY      TONSILLECTOMY      TOTAL KNEE ARTHROPLASTY Right 2020    RIGHT TOTAL KNEE REPLACEMENT       DUNAWAY & NEPHEW performed by Alida Domingo MD at [address] N/A 2019    UPPER EUS W/ANES.  (11:30) performed by Issa Allen DO at [place] [address]  2019    EGD BIOPSY performed by Issa Allen DO at [address] [place] [address] History:    Social History     Tobacco Use    Smoking status: Former Smoker     Quit date: 1981     Years since quittin.4    Smokeless tobacco: Never Used   Substance Use Topics    Alcohol use: Yes     Comment: 2 drinks per year                                Counseling given: Not Answered      Vital Signs (Current):   Vitals:    21 1443 21 1153   BP:  (!) 160/72   Pulse:  57   Resp:  16 Temp:  96.9 °F (36.1 °C)   TempSrc:  Temporal   SpO2:  98%   Weight: 169 lb (76.7 kg) 169 lb (76.7 kg)   Height: 5' 2\" (1.575 m) 5' 2\" (1.575 m)                                              BP Readings from Last 3 Encounters:   01/19/21 (!) 160/72   11/20/20 (!) 175/84   11/20/20 115/63       NPO Status:                                                                                 BMI:   Wt Readings from Last 3 Encounters:   01/19/21 169 lb (76.7 kg)   12/24/20 171 lb (77.6 kg)   12/09/20 171 lb (77.6 kg)     Body mass index is 30.91 kg/m². CBC:   Lab Results   Component Value Date    WBC 7.0 03/24/2018    RBC 4.07 03/24/2018    HGB 13.3 03/24/2018    HCT 38.2 03/24/2018    MCV 93.8 03/24/2018    RDW 13.1 03/24/2018     03/24/2018       CMP:   Lab Results   Component Value Date     03/24/2018    K 4.4 03/24/2018     03/24/2018    CO2 26 03/24/2018    BUN 15 03/24/2018    CREATININE 0.6 03/24/2018    GFRAA >60 03/24/2018    AGRATIO 1.5 03/24/2018    LABGLOM >60 03/24/2018    GLUCOSE 110 03/24/2018    PROT 6.6 03/24/2018    PROT 7.5 02/23/2011    CALCIUM 8.9 03/24/2018    BILITOT 0.6 03/24/2018    ALKPHOS 46 03/24/2018    AST 23 03/24/2018    ALT 27 03/24/2018       POC Tests: No results for input(s): POCGLU, POCNA, POCK, POCCL, POCBUN, POCHEMO, POCHCT in the last 72 hours. Coags: No results found for: PROTIME, INR, APTT    HCG (If Applicable): No results found for: PREGTESTUR, PREGSERUM, HCG, HCGQUANT     ABGs: No results found for: PHART, PO2ART, ZQQ5VSK, IGT0HVN, BEART, R3SWLGCT     Type & Screen (If Applicable):  No results found for: LABABO, LABRH    Drug/Infectious Status (If Applicable):  Lab Results   Component Value Date    HEPCAB Non-Reactive (Negative) 03/14/2011       COVID-19 Screening (If Applicable):   Lab Results   Component Value Date    COVID19 NOT DETECTED 01/14/2021         Anesthesia Evaluation     history of anesthetic complications (repeated aspirations): PONV. Airway: Mallampati: II  TM distance: <3 FB   Neck ROM: full  Mouth opening: > = 3 FB Dental: normal exam         Pulmonary:                             ROS comment: H/o tob   Cardiovascular:    (+) hypertension:,                   Neuro/Psych:   (+) neuromuscular disease:, headaches: migraine headaches, psychiatric history:            GI/Hepatic/Renal:   (+) GERD:,           Endo/Other: Negative Endo/Other ROS   (+) : arthritis: OA., .                 Abdominal:           Vascular: negative vascular ROS. Anesthesia Plan      general     ASA 2     (Pt agrees to risks, benefits and alternatives of GETA. Questions answered. Willing to proceed with plan.)  Induction: intravenous. Anesthetic plan and risks discussed with patient.                       Jennifer Schrader MD   1/19/2021

## 2021-01-19 NOTE — BRIEF OP NOTE
Brief Postoperative Note      Patient: Rafael Blanco  YOB: 1950  MRN: [id number]    Date of Procedure: 1/19/2021    Pre-Op Diagnosis: ARTHROFIBROSIS STATUS POST RIGHT TOTAL KNEE REPLACEMENT    Post-Op Diagnosis: Same       Procedure(s):  EXAM UNDER ANESTHESIA VIDEO ARTHROSCOPY RIGHT KNEE, MANIPULATION, SYNOVECTOMY, LYSIS ADHESIONS    Surgeon(s):  Carly Rock MD    Assistant:  Surgical Assistant: Syl Montiel  Physician Assistant: TERRY Bobby    Anesthesia: General    Estimated Blood Loss (mL): less than 50     Complications: Bleeding    Specimens:   * No specimens in log *    Implants:  * No implants in log *      Drains: * No LDAs found *    Findings: Arthrofbrosis    Electronically signed by TERRY Murray on 1/19/2021 at 1:56 PM

## 2021-01-19 NOTE — ANESTHESIA POSTPROCEDURE EVALUATION
Department of Anesthesiology  Postprocedure Note    Patient: Adalgisa Sage  MRN: [id number]  YOB: 1950  Date of evaluation: 1/19/2021  Time:  3:53 PM     Procedure Summary     Date: 01/19/21 Room / Location: [address] [place] [address]    Anesthesia Start: 1243 Anesthesia Stop: 0169    Procedure: EXAM UNDER ANESTHESIA VIDEO ARTHROSCOPY RIGHT KNEE, MANIPULATION, SYNOVECTOMY, LYSIS ADHESIONS (Right Knee) Diagnosis:       Arthrofibrosis of knee joint, right      Status post right unicompartmental knee replacement      (ARTHROFIBROSIS STATUS POST RIGHT TOTAL KNEE REPLACEMENT)    Surgeons: Manuel Hutchins MD Responsible Provider: Carmen Romero MD    Anesthesia Type: general ASA Status: 2          Anesthesia Type: general    Armand Phase I: Armand Score: 10    Armand Phase II: Armand Score: 10    Last vitals: Reviewed and per EMR flowsheets.      Vitals:    01/19/21 1400 01/19/21 1415 01/19/21 1430 01/19/21 1445   BP: (!) 162/76 (!) 167/70 (!) 162/71 (!) 156/75   Pulse: 70 66 61 62   Resp: 22 21 20 15   Temp:       TempSrc:       SpO2: 99% 96% 96% 98%   Weight:       Height:         Anesthesia Post Evaluation    Patient location during evaluation: bedside  Patient participation: complete - patient participated  Level of consciousness: awake and alert  Airway patency: patent  Nausea & Vomiting: no nausea  Complications: no  Cardiovascular status: hemodynamically stable  Respiratory status: acceptable  Hydration status: euvolemic

## 2021-01-19 NOTE — PROGRESS NOTES
Block Time Out      Verified   Correct Pt.   Correct   Correct Procedure  Correct Site  Correct Extremity  Dr. Alexandre Cortes adductor canal nerve block

## 2021-01-19 NOTE — H&P
I have reviewed the history and physical and examined the patient and find no relevant changes. I have reviewed with the patient and/or family the risks, benefits, and alternatives to the procedure. Patient being given increased dosage/quantity of opoid pain medication in excess of OSMB guidelines which noted a 30 MED daily of opioids due to the fact that he/she has undergone major orthopaedic surgery as outlined in rule 4731-11-13. Dosages and further duration of the pain medication will be re-evaluated at her post op visit in 2 weeks. Patient was educated on dosing expectations and limits of prescribing as a result of the new [facility] Board rules enacted August 31, 2017. Please also note that this is not the initial opoid prescription issued to this patient but a continuation of medication utilized during the hospital admission as noted in the medical record. [place]ARRS report has also been utilized to screen for any abuse history or suspicious activity as outlined in [place]. All efforts have been taken to prevent abuse potential and misuse of opioid medications including education, screening, and close clinical follow up.

## 2021-01-20 ENCOUNTER — HOSPITAL ENCOUNTER (OUTPATIENT)
Dept: PHYSICAL THERAPY | Age: 71
Setting detail: THERAPIES SERIES
Discharge: HOME | End: 2021-01-20
Payer: MEDICARE

## 2021-01-20 PROCEDURE — 97110 THERAPEUTIC EXERCISES: CPT

## 2021-01-20 PROCEDURE — 97016 VASOPNEUMATIC DEVICE THERAPY: CPT

## 2021-01-20 PROCEDURE — 97140 MANUAL THERAPY 1/> REGIONS: CPT

## 2021-01-20 PROCEDURE — 97164 PT RE-EVAL EST PLAN CARE: CPT

## 2021-01-20 NOTE — OP NOTE
[address] [address]                                OPERATIVE REPORT    PATIENT NAME: Vinicius Gamboa                      :        1950  MED REC NO:   [id number]                          ROOM:  ACCOUNT NO:   [de-identified]                           ADMIT DATE: 2021  PROVIDER:     Xochilt Ramsey MD    DATE OF PROCEDURE:  2021    SERVICE:  Orthopedic Surgery. SURGEON:  Manuel Hutchins MD    FIRST ASSISTANTConmary Valenzuela SA    PREOPERATIVE DIAGNOSIS:  Arthrofibrosis, right knee, status post right  total knee replacement of 2020. POSTOPERATIVE DIAGNOSIS:  Arthrofibrosis, right knee, status post right  total knee replacement of 2020. PROCEDURE:  1. Exam under anesthesia and video arthroscopy, right knee. 2.  Extensive tricompartmental synovectomy with resection of adhesions  and release of contracture of peripatellar region medially and  laterally. 3.  Manipulation under anesthesia. ANESTHESIA:  General.    COMPLICATIONS:  None. COUNTS:  Sponge and needle counts correct. DISPOSITION:  To recovery room. ANTIBIOTICS:  Per SCIP protocol. ESTIMATED BLOOD LOSS:  Negligible. TOURNIQUET:  350 mmHg for approximately 25 minutes. INDICATIONS FOR SURGERY:  The patient is a 70-year-old woman who had a  total knee replacement done on 2020. Unfortunately she had  several personal issues evolve and she lost focus on physical therapy. She ended up with some arthrofibrosis which was really dramatic. Her  range of motion was about 10 to 70 degrees. She had not made any  progress from physical therapy and the recommendation was made that she  go ahead and pursue a manipulation under anesthesia and video  arthroscopy. She also had a very tight and arthrofibrosed  patellofemoral joint. I could barely move her patella either medially  or laterally preoperatively.

## 2021-01-20 NOTE — PLAN OF CARE
[facility], [address] [place] [address]  Phone: [phone]  Fax [phone]    Physical Therapy Re-Certification Plan of Massiel Mirza      Dear Dr. Abdullahi Kerr,    We had the pleasure of treating the following patient for physical therapy services at [address] [place] [address]. A summary of our findings can be found in the updated assessment below. This includes our plan of care. If you have any questions or concerns regarding these findings, please do not hesitate to contact me at the office phone number checked above.   Thank you for the referral.     Physician Signature:________________________________Date:__________________  By signing above (or electronic signature), therapists plan is approved by physician    Date Range Of Visits: 2020  Total Visits to Date: 7  Overall Response to Treatment:   []Patient is responding well to treatment and improvement is noted with regards  to goals   []Patient should continue to improve in reasonable time if they continue HEP   []Patient has plateaued and is no longer responding to skilled PT intervention    []Patient is getting worse and would benefit from return to referring MD   []Patient unable to adhere to initial POC   [x]Other: Patient is now being seen post TKA manipulation        Physical Therapy Treatment Note/ Progress Report:           Date:  2021    Patient Name:  Giuliana Batista    :  1950  MRN: [id number]  Restrictions/Precautions:    Medical/Treatment Diagnosis Information:  Diagnosis: s/p r TKR sx date 2020 M25.561; M17.11  Treatment Diagnosis: R knee pain T60.488  Insurance/Certification information:  PT Insurance Information: /humana  Physician Information:  Referring Practitioner: Dr. Abdullahi Kerr  Has the plan of care been signed (Y/N):        []  Yes  [x]  No     Date of Patient follow up with Physician: 2021 [x] (66315) Provided manual therapy to mobilize LE, proximal hip and/or LS spine soft tissue/joints for the purpose of modulating pain, promoting relaxation,  increasing ROM, reducing/eliminating soft tissue swelling/inflammation/restriction, improving soft tissue extensibility and allowing for proper ROM for normal function with self care, mobility, lifting and ambulation. Modalities:     [] GAME READY (VASO)- for significant edema, swelling, pain control. Charges:  Timed Code Treatment Minutes: 50   Total Treatment Minutes: 60         [] EVAL (LOW) 61595   [] EVAL (MOD) 97833  [] EVAL (HIGH) 08489   [x] RE-EVAL     [x] YM(18325) x 1    [] IONTO  [] NMR (42204)      [x] VASO  [x] Manual (21141) x2      [] Other:  [] TA x      [] Mech Traction (62349)  [] ES(attended) (45652)      [] ES (un) (05714):         GOALS:  Patient stated goal: be able to squat; bend knee and no pain   [x] Progressing: [] Met: [] Not Met: [] Adjusted    Therapist goals for Patient:   Short Term Goals: To be achieved in: 2 weeks  1. Independent in HEP and progression per patient tolerance, in order to prevent re-injury. [x] Progressing: [] Met: [] Not Met: [] Adjusted  2. Patient will have a decrease in pain to facilitate improvement in movement, function, and ADLs as indicated by Functional Deficits. [x] Progressing: [] Met: [] Not Met: [] Adjusted    Long Term Goals: To be achieved in: 8-10 weeks  1. Disability index score of 40% or less for the LEFS to assist with reaching prior level of function. [] Progressing: [] Met: [] Not Met: [] Adjusted  2. Patient will demonstrate increased AROM to R knee ext to -2 and flex to 110  to allow for proper joint functioning as indicated by patients Functional Deficits.    [x] Progressing: [] Met: [] Not Met: [] Adjusted 3. Patient will demonstrate an increase in Strength to good proximal hip strength and control, within 5lb HHD in LE to allow for proper functional mobility as indicated by patients Functional Deficits. [] Progressing: [] Met: [] Not Met: [] Adjusted  4. Patient will return to being able to ambulate with proper gait without an AD; ascend and descend stairs to basement with reciprocal gait  without increased symptoms or restriction. [] Progressing: [] Met: [] Not Met: [] Adjusted    Progression Towards Functional goals:  [] Patient is progressing as expected towards functional goals listed. [x] Progression is slowed due to complexities listed. [] Progression has been slowed due to co-morbidities. [] Plan just implemented, too soon to assess goals progression  [] Other:         Overall Progression Towards Functional goals/ Treatment Progress Update:  [] Patient is progressing as expected towards functional goals listed. [] Progression is slowed due to complexities/Impairments listed. [] Progression has been slowed due to co-morbidities.   [x] Plan just implemented, too soon to assess goals progression <30days   [] Goals require adjustment due to lack of progress  [] Patient is not progressing as expected and requires additional follow up with physician  [] Other :     Prognosis for POC: [x] Good [] Fair  [] Poor      Patient requires continued skilled intervention: [x] Yes  [] No    Treatment/Activity Tolerance:  [] Patient able to complete treatment  [] Patient limited by fatigue  [x] Patient limited by pain    [] Patient limited by other medical complications  [] Other: ASSESSMENT:  Pt is now being seen s/p joint manipulation, presenting with better motion and pain tolerance than in prior visits. She does still demonstrate a hypomobile joint during mobilization however her range of motion is much improved. She would continue to benefit from skilled physical therapy to maximize her functional outcomes and progress towards goals. PLAN:   [] Continue per plan of care [x] Alter current plan (see comments above)  [] Plan of care initiated [] Hold pending MD visit [] Discharge      Electronically signed by:  Hector Haddad PT    Note: If patient does not return for scheduled/ recommended follow up visits, this note will serve as a discharge from care along with most recent update on progress.

## 2021-01-21 ENCOUNTER — HOSPITAL ENCOUNTER (OUTPATIENT)
Dept: PHYSICAL THERAPY | Age: 71
Setting detail: THERAPIES SERIES
End: 2021-01-21
Payer: MEDICARE

## 2021-01-21 ENCOUNTER — HOSPITAL ENCOUNTER (OUTPATIENT)
Dept: PHYSICAL THERAPY | Age: 71
Setting detail: THERAPIES SERIES
Discharge: HOME | End: 2021-01-21
Payer: MEDICARE

## 2021-01-21 PROCEDURE — 97140 MANUAL THERAPY 1/> REGIONS: CPT

## 2021-01-21 PROCEDURE — 97016 VASOPNEUMATIC DEVICE THERAPY: CPT

## 2021-01-21 PROCEDURE — 97110 THERAPEUTIC EXERCISES: CPT

## 2021-01-21 NOTE — FLOWSHEET NOTE
[facility], [address] [place] [address]  Phone: [phone]  Fax [phone]    Physical Therapy Re-Certification Plan of Juli Aviles      Dear Dr. Bowen Gan,    We had the pleasure of treating the following patient for physical therapy services at [address] [place] [address]. A summary of our findings can be found in the updated assessment below. This includes our plan of care. If you have any questions or concerns regarding these findings, please do not hesitate to contact me at the office phone number checked above.   Thank you for the referral.     Physician Signature:________________________________Date:__________________  By signing above (or electronic signature), therapists plan is approved by physician    Date Range Of Visits: 2020  Total Visits to Date: 7  Overall Response to Treatment:   []Patient is responding well to treatment and improvement is noted with regards  to goals   []Patient should continue to improve in reasonable time if they continue HEP   []Patient has plateaued and is no longer responding to skilled PT intervention    []Patient is getting worse and would benefit from return to referring MD   []Patient unable to adhere to initial POC   [x]Other: Patient is now being seen post TKA manipulation        Physical Therapy Treatment Note/ Progress Report:           Date:  2021    Patient Name:  Eliu Taveras    :  1950  MRN: [id number]  Restrictions/Precautions:    Medical/Treatment Diagnosis Information:  Diagnosis: s/p r TKR sx date 2020 M25.561; M17.11  Treatment Diagnosis: R knee pain R17.468  Insurance/Certification information:  PT Insurance Information: /humana  Physician Information:  Referring Practitioner: Dr. Bowen Gan  Has the plan of care been signed (Y/N):        []  Yes  [x]  No     Date of Patient follow up with Physician: 2021 Is this a Progress Report:     []  Yes  [x]  No        If Yes:  Date Range for reporting period:  Beginning 1/20/2021  Ending    Progress report will be due (10 Rx or 30 days whichever is less): 2/64/3588      Recertification will be due (POC Duration  / 90 days whichever is less): 4/20/2021       Visit # Date Range Insurance Allowable Requires auth   8 12/7/2020 BMN    [x]no        []yes:           SUBJECTIVE:   Patient reports her knee feels much better since her surgery. Pain level:  0/10     OBJECTIVE: See below    ? Observation: fast mil with RW, lacking full KE in R LE stance phase; Very tight post knee, HS, lateral gastroc. Hypomobile  calcaneal EV mobility. ?     PT Practice Pattern:H  Co morbidities:1-2  SURgical R TKR 11/20/20  INITIAL VISIT 12/7/20 CURRENT VISIT 12/16/20 CURRENT VISIT 1/21/20   PAIN 0-10/10      FUNCTIONAL SCALE LEFS (13/80) 84%  (27/80) 66%   ROM KE -7 -12 -12 deg    KF 43 55 85 deg                                STRENGHT (MMT) Quad tone  trace NT                                              RESTRICTIONS/PRECAUTIONS: OA; osteopenia     Exercises/Interventions:   HEP instruction was provided and handouts given to include:  Access Code: [email or web address]   Patient Portal: [email or web address]      Therapeutic Ex (20431) Reps/Sets/sec Notes/CUES   Reviewed current HEP from home PT  3'                                2x10x3\"            Manual stabilization to try to straighten knee.    4x15\" MC   Seated self FLX EOB 5x20\"                april stretch HS stretch 10x10\" ea         Seated hamstring stretch 5x30\"    EZ stretch chair  Flex  Ext   4'  4'   85 deg @ finish  - 12 deg @ finish        Flexion/ext on swiss ball w/OP 10\"x10         Recumbent bike for ROM 5' (F/B)    Pt education x5' Pain, progression, HEP,    Manual Intervention (83481)     STM quad, HS, IRB, PFJ  Patellar mobs GII  TIB fem mobs and oscillations GII-III with PROM EXT/FLX/STM 15' NMR re-education (92965)  CUES NEEDED   Gait training with rolling walker/cane/no AD x3' Symmetrical step length, KE in stance phase, heel strike   painful                                    Therapeutic Activity (43605)                                         Therapeutic Exercise and NMR EXR  [x] (23644) Provided verbal/tactile cueing for activities related to strengthening, flexibility, endurance, ROM for improvements in LE, proximal hip, and core control with self care, mobility, lifting, ambulation. [x] (46797) Provided verbal/tactile cueing for activities related to improving balance, coordination, kinesthetic sense, posture, motor skill, proprioception  to assist with LE, proximal hip, and core control in self care, mobility, lifting, ambulation and eccentric single leg control.      NMR and Therapeutic Activities:    [] (90527 or 02713) Provided verbal/tactile cueing for activities related to improving balance, coordination, kinesthetic sense, posture, motor skill, proprioception and motor activation to allow for proper function of core, proximal hip and LE with self care and ADLs  [x] (04078) Gait Re-education- Provided training and instruction to the patient for proper LE, core and proximal hip recruitment and positioning and eccentric body weight control with ambulation re-education including up and down stairs     Home Exercise Program:    [x] (27189) Reviewed/Progressed HEP activities related to strengthening, flexibility, endurance, ROM of core, proximal hip and LE for functional self-care, mobility, lifting and ambulation/stair navigation   [] (45823)Reviewed/Progressed HEP activities related to improving balance, coordination, kinesthetic sense, posture, motor skill, proprioception of core, proximal hip and LE for self care, mobility, lifting, and ambulation/stair navigation      Manual Treatments:  PROM / STM / Oscillations-Mobs:  G-I, II, III, IV (PA's, Inf., Post.) [x] (78280) Provided manual therapy to mobilize LE, proximal hip and/or LS spine soft tissue/joints for the purpose of modulating pain, promoting relaxation,  increasing ROM, reducing/eliminating soft tissue swelling/inflammation/restriction, improving soft tissue extensibility and allowing for proper ROM for normal function with self care, mobility, lifting and ambulation. Modalities:     [x] GAME READY (VASO)- for significant edema, swelling, pain control. Charges:  Timed Code Treatment Minutes: 50   Total Treatment Minutes: 60         [] EVAL (LOW) 23439   [] EVAL (MOD) 83841  [] EVAL (HIGH) 74061   [] RE-EVAL     [x] QE(19330) x 2    [] IONTO  [] NMR (62315)      [x] VASO  [x] Manual (69039) x1      [] Other:  [] TA x      [] Mech Traction (55743)  [] ES(attended) (52252)      [] ES (un) (38403):         GOALS:  Patient stated goal: be able to squat; bend knee and no pain   [x] Progressing: [] Met: [] Not Met: [] Adjusted    Therapist goals for Patient:   Short Term Goals: To be achieved in: 2 weeks  1. Independent in HEP and progression per patient tolerance, in order to prevent re-injury. [x] Progressing: [] Met: [] Not Met: [] Adjusted  2. Patient will have a decrease in pain to facilitate improvement in movement, function, and ADLs as indicated by Functional Deficits. [x] Progressing: [] Met: [] Not Met: [] Adjusted    Long Term Goals: To be achieved in: 8-10 weeks  1. Disability index score of 40% or less for the LEFS to assist with reaching prior level of function. [] Progressing: [] Met: [] Not Met: [] Adjusted  2. Patient will demonstrate increased AROM to R knee ext to -2 and flex to 110  to allow for proper joint functioning as indicated by patients Functional Deficits.    [x] Progressing: [] Met: [] Not Met: [] Adjusted 3. Patient will demonstrate an increase in Strength to good proximal hip strength and control, within 5lb HHD in LE to allow for proper functional mobility as indicated by patients Functional Deficits. [] Progressing: [] Met: [] Not Met: [] Adjusted  4. Patient will return to being able to ambulate with proper gait without an AD; ascend and descend stairs to basement with reciprocal gait  without increased symptoms or restriction. [] Progressing: [] Met: [] Not Met: [] Adjusted    Progression Towards Functional goals:  [] Patient is progressing as expected towards functional goals listed. [x] Progression is slowed due to complexities listed. [] Progression has been slowed due to co-morbidities. [] Plan just implemented, too soon to assess goals progression  [] Other:         Overall Progression Towards Functional goals/ Treatment Progress Update:  [] Patient is progressing as expected towards functional goals listed. [] Progression is slowed due to complexities/Impairments listed. [] Progression has been slowed due to co-morbidities. [x] Plan just implemented, too soon to assess goals progression <30days   [] Goals require adjustment due to lack of progress  [] Patient is not progressing as expected and requires additional follow up with physician  [] Other :     Prognosis for POC: [x] Good [] Fair  [] Poor      Patient requires continued skilled intervention: [x] Yes  [] No    Treatment/Activity Tolerance:  [] Patient able to complete treatment  [] Patient limited by fatigue  [x] Patient limited by pain    [] Patient limited by other medical complications  [] Other:     ASSESSMENT:  Pt is now being seen s/p joint manipulation, presenting with better motion and pain tolerance than in prior visits. This date she achieved 85 deg of flx and lacking 12 deg of ext. She would continue to benefit from skilled physical therapy to maximize her functional outcomes and progress towards goals.          PLAN: [] Continue per plan of care [x] Alter current plan (see comments above)  [] Plan of care initiated [] Hold pending MD visit [] Discharge      Electronically signed by:  Bronson Petersen PT, DPT, [facility] [id number]     Note: If patient does not return for scheduled/ recommended follow up visits, this note will serve as a discharge from care along with most recent update on progress.

## 2021-01-22 ENCOUNTER — OFFICE VISIT (OUTPATIENT)
Dept: ORTHOPEDIC SURGERY | Age: 71
End: 2021-01-22

## 2021-01-22 ENCOUNTER — HOSPITAL ENCOUNTER (OUTPATIENT)
Dept: PHYSICAL THERAPY | Age: 71
Setting detail: THERAPIES SERIES
Discharge: HOME | End: 2021-01-22
Payer: MEDICARE

## 2021-01-22 VITALS — WEIGHT: 169 LBS | HEIGHT: 62 IN | BODY MASS INDEX: 31.1 KG/M2

## 2021-01-22 DIAGNOSIS — M24.661 FIBROSIS OF RIGHT KNEE JOINT: Primary | ICD-10-CM

## 2021-01-22 PROCEDURE — 97016 VASOPNEUMATIC DEVICE THERAPY: CPT

## 2021-01-22 PROCEDURE — 97112 NEUROMUSCULAR REEDUCATION: CPT

## 2021-01-22 PROCEDURE — 99024 POSTOP FOLLOW-UP VISIT: CPT | Performed by: ORTHOPAEDIC SURGERY

## 2021-01-22 PROCEDURE — 97140 MANUAL THERAPY 1/> REGIONS: CPT

## 2021-01-22 PROCEDURE — 97110 THERAPEUTIC EXERCISES: CPT

## 2021-01-22 NOTE — PROGRESS NOTES
PREOPERATIVE DIAGNOSIS:  Arthrofibrosis, right knee, status post right  total knee replacement of 11/20/2020.     POSTOPERATIVE DIAGNOSIS:  Arthrofibrosis, right knee, status post right  total knee replacement of 11/20/2020.     PROCEDURE:  1. Exam under anesthesia and video arthroscopy, right knee. 2.  Extensive tricompartmental synovectomy with resection of adhesions  and release of contracture of peripatellar region medially and  laterally. 3.  Manipulation under anesthesia. Report she is actually doing very well. She is in minimal pains and work with a therapist.    The incision is completely benign. Nylon in the portals. 1+ effusion. Range of motion today is 7-95. The patient is going to work aggressively on physical therapy and be seen back again in 1 week. I have personally performed and/or participated in the history, exam and medical decision making and agree with all pertinent clinical information. I have also reviewed and agree with the past medical, family and social history unless otherwise noted. This dictation was performed with a verbal recognition program (DRAGON) and it was checked for errors. It is possible that there are still dictated errors within this office note. If so, please bring any errors to my attention for an addendum. All efforts were made to ensure that this office note is accurate.           Nona Scott MD

## 2021-01-22 NOTE — OP NOTE
[facility], [address] [place] [address]  Phone: [phone]  Fax [phone]    Physical Therapy Re-Certification Plan of Esteban Rodriguez      Dear Dr. Laureano Mcadams,    We had the pleasure of treating the following patient for physical therapy services at [address] [place] [address]. A summary of our findings can be found in the updated assessment below. This includes our plan of care. If you have any questions or concerns regarding these findings, please do not hesitate to contact me at the office phone number checked above.   Thank you for the referral.     Physician Signature:________________________________Date:__________________  By signing above (or electronic signature), therapists plan is approved by physician    Date Range Of Visits: 2020  Total Visits to Date: 7  Overall Response to Treatment:   []Patient is responding well to treatment and improvement is noted with regards  to goals   []Patient should continue to improve in reasonable time if they continue HEP   []Patient has plateaued and is no longer responding to skilled PT intervention    []Patient is getting worse and would benefit from return to referring MD   []Patient unable to adhere to initial POC   [x]Other: Patient is now being seen post TKA manipulation        Physical Therapy Treatment Note/ Progress Report:           Date:  2021    Patient Name:  Dereck Iniguez    :  1950  MRN: [id number]  Restrictions/Precautions:    Medical/Treatment Diagnosis Information:  Diagnosis: s/p r TKR sx date 2020 M25.561; M17.11  Treatment Diagnosis: R knee pain J10.377  Insurance/Certification information:  PT Insurance Information: /humana  Physician Information:  Referring Practitioner: Dr. Laureano Mcadams  Has the plan of care been signed (Y/N):        []  Yes  [x]  No     Date of Patient follow up with Physician: 2021 Is this a Progress Report:     []  Yes  [x]  No        If Yes:  Date Range for reporting period:  Beginning 1/20/2021  Ending    Progress report will be due (10 Rx or 30 days whichever is less): 0/42/7006      Recertification will be due (POC Duration  / 90 days whichever is less): 4/20/2021       Visit # Date Range Insurance Allowable Requires auth   9 12/7/2020 BMN    [x]no        []yes:           SUBJECTIVE:   Patient notes she didn't feel too sore after yesterdays appt. Pain level:  0/10     OBJECTIVE: See below    ? Observation: fast mil with RW, lacking full KE in R LE stance phase; Very tight post knee, HS, lateral gastroc. Hypomobile  calcaneal EV mobility. ?     PT Practice Pattern:H  Co morbidities:1-2  SURgical R TKR 11/20/20  INITIAL VISIT 12/7/20 CURRENT VISIT 12/16/20 CURRENT VISIT 1/22/20   PAIN 0-10/10   0/10   FUNCTIONAL SCALE LEFS (13/80) 84%  (27/80) 66%   ROM KE -7 -12 -10 deg    KF 43 55 90 deg                                STRENGHT (MMT) Quad tone  trace NT                                              RESTRICTIONS/PRECAUTIONS: OA; osteopenia       Therapeutic Exercise and NMR EXR  [x] (97972) Provided verbal/tactile cueing for activities related to strengthening, flexibility, endurance, ROM for improvements in LE, proximal hip, and core control with self care, mobility, lifting, ambulation. [x] (29280) Provided verbal/tactile cueing for activities related to improving balance, coordination, kinesthetic sense, posture, motor skill, proprioception  to assist with LE, proximal hip, and core control in self care, mobility, lifting, ambulation and eccentric single leg control.      NMR and Therapeutic Activities:    [] (28086 or 55879) Provided verbal/tactile cueing for activities related to improving balance, coordination, kinesthetic sense, posture, motor skill, proprioception and motor activation to allow for proper function of core, proximal hip and LE with self care and ADLs [x] (52096) Gait Re-education- Provided training and instruction to the patient for proper LE, core and proximal hip recruitment and positioning and eccentric body weight control with ambulation re-education including up and down stairs     Home Exercise Program:    [x] (19737) Reviewed/Progressed HEP activities related to strengthening, flexibility, endurance, ROM of core, proximal hip and LE for functional self-care, mobility, lifting and ambulation/stair navigation   [] (98299)Reviewed/Progressed HEP activities related to improving balance, coordination, kinesthetic sense, posture, motor skill, proprioception of core, proximal hip and LE for self care, mobility, lifting, and ambulation/stair navigation      Manual Treatments:  PROM / STM / Oscillations-Mobs:  G-I, II, III, IV (PA's, Inf., Post.)  [x] (21074) Provided manual therapy to mobilize LE, proximal hip and/or LS spine soft tissue/joints for the purpose of modulating pain, promoting relaxation,  increasing ROM, reducing/eliminating soft tissue swelling/inflammation/restriction, improving soft tissue extensibility and allowing for proper ROM for normal function with self care, mobility, lifting and ambulation. Modalities:     [x] GAME READY (VASO)- for significant edema, swelling, pain control. x10'    Charges:  Timed Code Treatment Minutes: 39'   Total Treatment Minutes: 54'         [] EVAL (LOW) 455 1011   [] EVAL (MOD) 04407  [] EVAL (HIGH) 45510   [] RE-EVAL     [x] WG(01155) x 1    [] IONTO  [x] NMR (22077)  x1    [x] VASO  [x] Manual (09455) x1      [] Other:  [] TA x      [] Mech Traction (36467)  [] ES(attended) (01794)      [] ES (un) (00829):         GOALS:  Patient stated goal: be able to squat; bend knee and no pain   [x] Progressing: [] Met: [] Not Met: [] Adjusted    Therapist goals for Patient:   Short Term Goals: To be achieved in: 2 weeks  1. Independent in HEP and progression per patient tolerance, in order to prevent re-injury. [x] Progressing: [] Met: [] Not Met: [] Adjusted  2. Patient will have a decrease in pain to facilitate improvement in movement, function, and ADLs as indicated by Functional Deficits. [x] Progressing: [] Met: [] Not Met: [] Adjusted    Long Term Goals: To be achieved in: 8-10 weeks  1. Disability index score of 40% or less for the LEFS to assist with reaching prior level of function. [] Progressing: [] Met: [] Not Met: [] Adjusted  2. Patient will demonstrate increased AROM to R knee ext to -2 and flex to 110  to allow for proper joint functioning as indicated by patients Functional Deficits. [x] Progressing: [] Met: [] Not Met: [] Adjusted  3. Patient will demonstrate an increase in Strength to good proximal hip strength and control, within 5lb HHD in LE to allow for proper functional mobility as indicated by patients Functional Deficits. [] Progressing: [] Met: [] Not Met: [] Adjusted  4. Patient will return to being able to ambulate with proper gait without an AD; ascend and descend stairs to basement with reciprocal gait  without increased symptoms or restriction. [] Progressing: [] Met: [] Not Met: [] Adjusted    Progression Towards Functional goals:  [] Patient is progressing as expected towards functional goals listed. [x] Progression is slowed due to complexities listed. [] Progression has been slowed due to co-morbidities. [] Plan just implemented, too soon to assess goals progression  [] Other:         Overall Progression Towards Functional goals/ Treatment Progress Update:  [] Patient is progressing as expected towards functional goals listed. [] Progression is slowed due to complexities/Impairments listed. [] Progression has been slowed due to co-morbidities.   [x] Plan just implemented, too soon to assess goals progression <30days   [] Goals require adjustment due to lack of progress  [] Patient is not progressing as expected and requires additional follow up with physician  [] Other : Prognosis for POC: [x] Good [] Fair  [] Poor      Patient requires continued skilled intervention: [x] Yes  [] No    Treatment/Activity Tolerance:  [] Patient able to complete treatment  [] Patient limited by fatigue  [x] Patient limited by pain    [] Patient limited by other medical complications  [] Other:     ASSESSMENT:  Pt is now being seen s/p joint manipulation, presenting with better motion and pain tolerance than in prior visits. This date she achieved 90 deg of flx and lacking 10 deg of ext. She would continue to benefit from skilled physical therapy to maximize her functional outcomes and progress towards goals. PLAN: Cont R knee ROM and strength and normalization of gait mechanics. [] Continue per plan of care [x] Alter current plan (see comments above)  [] Plan of care initiated [] Hold pending MD visit [] Discharge      Electronically signed by:  Paula Hodge, PT, DPT, OCS [id number]     Note: If patient does not return for scheduled/ recommended follow up visits, this note will serve as a discharge from care along with most recent update on progress.

## 2021-01-22 NOTE — FLOWSHEET NOTE
[facility], [address]  Phone: [phone]  Fax [phone]    Physical Therapy Re-Certification Plan of Romina Ramirez      Dear Dr. Juani Velazquez,    We had the pleasure of treating the following patient for physical therapy services at [address] [place] [address]. A summary of our findings can be found in the updated assessment below. This includes our plan of care. If you have any questions or concerns regarding these findings, please do not hesitate to contact me at the office phone number checked above.   Thank you for the referral.     Physician Signature:________________________________Date:__________________  By signing above (or electronic signature), therapists plan is approved by physician    Date Range Of Visits: 2020  Total Visits to Date: 7  Overall Response to Treatment:   []Patient is responding well to treatment and improvement is noted with regards  to goals   []Patient should continue to improve in reasonable time if they continue HEP   []Patient has plateaued and is no longer responding to skilled PT intervention    []Patient is getting worse and would benefit from return to referring MD   []Patient unable to adhere to initial POC   [x]Other: Patient is now being seen post TKA manipulation        Physical Therapy Treatment Note/ Progress Report:           Date:  2021    Patient Name:  Finn Duval    :  1950  MRN: [id number]  Restrictions/Precautions:    Medical/Treatment Diagnosis Information:  Diagnosis: s/p r TKR sx date 2020 M25.561; M17.11  Treatment Diagnosis: R knee pain O52.365  Insurance/Certification information:  PT Insurance Information: /humana  Physician Information:  Referring Practitioner: Dr. Juani Velazquez  Has the plan of care been signed (Y/N):        []  Yes  [x]  No     Date of Patient follow up with Physician: 2021 Is this a Progress Report:     []  Yes  [x]  No        If Yes:  Date Range for reporting period:  Beginning 1/20/2021  Ending    Progress report will be due (10 Rx or 30 days whichever is less): 9/30/8041      Recertification will be due (POC Duration  / 90 days whichever is less): 4/20/2021       Visit # Date Range Insurance Allowable Requires auth   9 12/7/2020 BMN    [x]no        []yes:           SUBJECTIVE:   Patient notes she didn't feel too sore after yesterdays appt. Pain level:  0/10     OBJECTIVE: See below    ? Observation: fast mil with RW, lacking full KE in R LE stance phase; Very tight post knee, HS, lateral gastroc. Hypomobile  calcaneal EV mobility. ?     PT Practice Pattern:H  Co morbidities:1-2  SURgical R TKR 11/20/20  INITIAL VISIT 12/7/20 CURRENT VISIT 12/16/20 CURRENT VISIT 1/22/20   PAIN 0-10/10   0/10   FUNCTIONAL SCALE LEFS (13/80) 84%  (27/80) 66%   ROM KE -7 -12 -10 deg    KF 43 55 90 deg                                STRENGHT (MMT) Quad tone  trace NT                                              RESTRICTIONS/PRECAUTIONS: OA; osteopenia     Exercises/Interventions:   HEP instruction was provided and handouts given to include:  Access Code: [email or web address]   Patient Portal: [email or web address]      Therapeutic Ex (53851) Reps/Sets/sec Notes/CUES   Reviewed current HEP from home PT  3'                                2x10x3\"            Manual stabilization to try to straighten knee.    4x15\" MC   Seated self FLX EOB 5x20\"                arpil stretch HS stretch 10x10\" ea    Trustretch KF stretch 10\"x10     Seated hamstring stretch 5x30\"    EZ stretch chair  Flex  Ext   4'  4'   85 deg @ finish  - 12 deg @ finish        Flexion/ext on swiss ball w/OP 10\"x10         Recumbent bike for ROM 5' (F/B)    Pt education x5' Pain, progression, HEP,    Manual Intervention (93480)     STM quad, HS, IRB, PFJ  Patellar mobs GII  TIB fem mobs and oscillations GII-III with PROM EXT/FLX/STM 15' NMR re-education (83319)  CUES NEEDED   Gait training with rolling walker/cane/no AD x3' Symmetrical step length, KE in stance phase, heel strike   painful           SLR MWM for KE 2x10  Good tolerance                        Therapeutic Activity (65781)                                         Therapeutic Exercise and NMR EXR  [x] (60230) Provided verbal/tactile cueing for activities related to strengthening, flexibility, endurance, ROM for improvements in LE, proximal hip, and core control with self care, mobility, lifting, ambulation. [x] (21594) Provided verbal/tactile cueing for activities related to improving balance, coordination, kinesthetic sense, posture, motor skill, proprioception  to assist with LE, proximal hip, and core control in self care, mobility, lifting, ambulation and eccentric single leg control.      NMR and Therapeutic Activities:    [] (76234 or 56263) Provided verbal/tactile cueing for activities related to improving balance, coordination, kinesthetic sense, posture, motor skill, proprioception and motor activation to allow for proper function of core, proximal hip and LE with self care and ADLs  [x] (00587) Gait Re-education- Provided training and instruction to the patient for proper LE, core and proximal hip recruitment and positioning and eccentric body weight control with ambulation re-education including up and down stairs     Home Exercise Program:    [x] (86165) Reviewed/Progressed HEP activities related to strengthening, flexibility, endurance, ROM of core, proximal hip and LE for functional self-care, mobility, lifting and ambulation/stair navigation   [] (75883)Reviewed/Progressed HEP activities related to improving balance, coordination, kinesthetic sense, posture, motor skill, proprioception of core, proximal hip and LE for self care, mobility, lifting, and ambulation/stair navigation Manual Treatments:  PROM / STM / Oscillations-Mobs:  G-I, II, III, IV (PA's, Inf., Post.)  [x] (09178) Provided manual therapy to mobilize LE, proximal hip and/or LS spine soft tissue/joints for the purpose of modulating pain, promoting relaxation,  increasing ROM, reducing/eliminating soft tissue swelling/inflammation/restriction, improving soft tissue extensibility and allowing for proper ROM for normal function with self care, mobility, lifting and ambulation. Modalities:     [x] GAME READY (VASO)- for significant edema, swelling, pain control. x10'    Charges:  Timed Code Treatment Minutes: 39'   Total Treatment Minutes: 54'         [] EVAL (LOW) 455 1011   [] EVAL (MOD) 15061  [] EVAL (HIGH) 33063   [] RE-EVAL     [x] NF(78843) x 1    [] IONTO  [x] NMR (21986)  x1    [x] VASO  [x] Manual (79687) x1      [] Other:  [] TA x      [] Mech Traction (63319)  [] ES(attended) (33874)      [] ES (un) (81390):         GOALS:  Patient stated goal: be able to squat; bend knee and no pain   [x] Progressing: [] Met: [] Not Met: [] Adjusted    Therapist goals for Patient:   Short Term Goals: To be achieved in: 2 weeks  1. Independent in HEP and progression per patient tolerance, in order to prevent re-injury. [x] Progressing: [] Met: [] Not Met: [] Adjusted  2. Patient will have a decrease in pain to facilitate improvement in movement, function, and ADLs as indicated by Functional Deficits. [x] Progressing: [] Met: [] Not Met: [] Adjusted    Long Term Goals: To be achieved in: 8-10 weeks  1. Disability index score of 40% or less for the LEFS to assist with reaching prior level of function. [] Progressing: [] Met: [] Not Met: [] Adjusted  2. Patient will demonstrate increased AROM to R knee ext to -2 and flex to 110  to allow for proper joint functioning as indicated by patients Functional Deficits.    [x] Progressing: [] Met: [] Not Met: [] Adjusted 3. Patient will demonstrate an increase in Strength to good proximal hip strength and control, within 5lb HHD in LE to allow for proper functional mobility as indicated by patients Functional Deficits. [] Progressing: [] Met: [] Not Met: [] Adjusted  4. Patient will return to being able to ambulate with proper gait without an AD; ascend and descend stairs to basement with reciprocal gait  without increased symptoms or restriction. [] Progressing: [] Met: [] Not Met: [] Adjusted    Progression Towards Functional goals:  [] Patient is progressing as expected towards functional goals listed. [x] Progression is slowed due to complexities listed. [] Progression has been slowed due to co-morbidities. [] Plan just implemented, too soon to assess goals progression  [] Other:         Overall Progression Towards Functional goals/ Treatment Progress Update:  [] Patient is progressing as expected towards functional goals listed. [] Progression is slowed due to complexities/Impairments listed. [] Progression has been slowed due to co-morbidities. [x] Plan just implemented, too soon to assess goals progression <30days   [] Goals require adjustment due to lack of progress  [] Patient is not progressing as expected and requires additional follow up with physician  [] Other :     Prognosis for POC: [x] Good [] Fair  [] Poor      Patient requires continued skilled intervention: [x] Yes  [] No    Treatment/Activity Tolerance:  [] Patient able to complete treatment  [] Patient limited by fatigue  [x] Patient limited by pain    [] Patient limited by other medical complications  [] Other:     ASSESSMENT:  Pt is now being seen s/p joint manipulation, presenting with better motion and pain tolerance than in prior visits. This date she achieved 90 deg of flx and lacking 10 deg of ext. She would continue to benefit from skilled physical therapy to maximize her functional outcomes and progress towards goals. PLAN: Cont R knee ROM and strength and normalization of gait mechanics. [] Continue per plan of care [x] Alter current plan (see comments above)  [] Plan of care initiated [] Hold pending MD visit [] Discharge      Electronically signed by:  Reena Guillory, PT, DPT, [facility] [id number]     Note: If patient does not return for scheduled/ recommended follow up visits, this note will serve as a discharge from care along with most recent update on progress.

## 2021-01-25 ENCOUNTER — HOSPITAL ENCOUNTER (OUTPATIENT)
Dept: PHYSICAL THERAPY | Age: 71
Setting detail: THERAPIES SERIES
Discharge: HOME | End: 2021-01-25
Payer: MEDICARE

## 2021-01-25 PROCEDURE — 97140 MANUAL THERAPY 1/> REGIONS: CPT

## 2021-01-25 PROCEDURE — 97110 THERAPEUTIC EXERCISES: CPT

## 2021-01-25 PROCEDURE — 97016 VASOPNEUMATIC DEVICE THERAPY: CPT

## 2021-01-25 PROCEDURE — 97112 NEUROMUSCULAR REEDUCATION: CPT

## 2021-01-25 NOTE — FLOWSHEET NOTE
[facility], [address] [place] [address]  Phone: [phone]  Fax [phone]    Physical Therapy Re-Certification Plan of Isaac Luna      Dear Dr. Mukesh Pimentel,    We had the pleasure of treating the following patient for physical therapy services at [address] [place] [address]. A summary of our findings can be found in the updated assessment below. This includes our plan of care. If you have any questions or concerns regarding these findings, please do not hesitate to contact me at the office phone number checked above.   Thank you for the referral.     Physician Signature:________________________________Date:__________________  By signing above (or electronic signature), therapists plan is approved by physician    Date Range Of Visits: 2020  Total Visits to Date: 7  Overall Response to Treatment:   []Patient is responding well to treatment and improvement is noted with regards  to goals   []Patient should continue to improve in reasonable time if they continue HEP   []Patient has plateaued and is no longer responding to skilled PT intervention    []Patient is getting worse and would benefit from return to referring MD   []Patient unable to adhere to initial POC   [x]Other: Patient is now being seen post TKA manipulation        Physical Therapy Treatment Note/ Progress Report:           Date:  2021    Patient Name:  Jia Kirkpatrick    :  1950  MRN: [id number]  Restrictions/Precautions:    Medical/Treatment Diagnosis Information:  Diagnosis: s/p r TKR sx date 2020 M25.561; M17.11  Treatment Diagnosis: R knee pain J63.541  Insurance/Certification information:  PT Insurance Information: /humana  Physician Information:  Referring Practitioner: Dr. Mukesh Pimentel  Has the plan of care been signed (Y/N):        []  Yes  [x]  No     Date of Patient follow up with Physician: 2021 Is this a Progress Report:     []  Yes  [x]  No        If Yes:  Date Range for reporting period:  Beginning 1/20/2021  Ending    Progress report will be due (10 Rx or 30 days whichever is less): 1/54/5428      Recertification will be due (POC Duration  / 90 days whichever is less): 4/20/2021       Visit # Date Range Insurance Allowable Requires auth   10 12/7/2020 BMN    [x]no        []yes:           SUBJECTIVE:   Patient reports soreness over the weekend that she expected. She has been diligent with her home exercises. Pain level:  0/10     OBJECTIVE: See below    ? Observation: fast mil with RW, lacking full KE in R LE stance phase; Very tight post knee, HS, lateral gastroc. .  ?     PT Practice Pattern:H  Co morbidities:1-2  SURgical R TKR 11/20/20  INITIAL VISIT 12/7/20 CURRENT VISIT 12/16/20 CURRENT VISIT 1/25/20   PAIN 0-10/10   0/10   FUNCTIONAL SCALE LEFS (13/80) 84%  NT   ROM KE -7 -12 -10 deg    KF 43 55 85 deg                                STRENGHT (MMT) Quad tone  trace NT                                              RESTRICTIONS/PRECAUTIONS: OA; osteopenia     Exercises/Interventions:   HEP instruction was provided and handouts given to include:  Access Code: [email or web address]   Patient Portal: [email or web address]      Therapeutic Ex (89844) Reps/Sets/sec Notes/CUES   Reviewed current HEP from home PT  3'                                2x10x3\"    Seated HP gastroc stretch with strapcombo HS [id number]'        Manual stabilization to try to straighten knee.    4x15\" MC   Seated self FLX EOB 5x20\"                april stretch HS stretch 10x10\" ea    Trustretch KF stretch 10\"x10     Seated hamstring stretch 5x30\"    EZ stretch chair  Flex  Ext   6'  6'   85 deg @ finish  - 12 deg @ finish        Flexion/ext on swiss ball w/OP 10\"x10         Recumbent bike for ROM 6' (F/B)    Pt education x5' Pain, progression, HEP,    Manual Intervention (48957)     STM quad, HS, IRB, PFJ  Patellar mobs GII [] (37410)Reviewed/Progressed HEP activities related to improving balance, coordination, kinesthetic sense, posture, motor skill, proprioception of core, proximal hip and LE for self care, mobility, lifting, and ambulation/stair navigation      Manual Treatments:  PROM / STM / Oscillations-Mobs:  G-I, II, III, IV (PA's, Inf., Post.)  [x] (16684) Provided manual therapy to mobilize LE, proximal hip and/or LS spine soft tissue/joints for the purpose of modulating pain, promoting relaxation,  increasing ROM, reducing/eliminating soft tissue swelling/inflammation/restriction, improving soft tissue extensibility and allowing for proper ROM for normal function with self care, mobility, lifting and ambulation. Modalities:     [x] GAME READY (VASO)- for significant edema, swelling, pain control. x15'    Charges:  Timed Code Treatment Minutes: 54'   Total Treatment Minutes: 79'         [] EVAL (LOW) 455 1011   [] EVAL (MOD) 43619  [] EVAL (HIGH) 91624   [] RE-EVAL     [x] AI(08726) x 2    [] IONTO  [x] NMR (21212)  x 1   [x] VASO  [x] Manual (53918) x1      [] Other:  [] TA x      [] Mech Traction (21092)  [] ES(attended) (41144)      [] ES (un) (48564):         GOALS:  Patient stated goal: be able to squat; bend knee and no pain   [x] Progressing: [] Met: [] Not Met: [] Adjusted    Therapist goals for Patient:   Short Term Goals: To be achieved in: 2 weeks  1. Independent in HEP and progression per patient tolerance, in order to prevent re-injury. [x] Progressing: [] Met: [] Not Met: [] Adjusted  2. Patient will have a decrease in pain to facilitate improvement in movement, function, and ADLs as indicated by Functional Deficits. [x] Progressing: [] Met: [] Not Met: [] Adjusted    Long Term Goals: To be achieved in: 8-10 weeks  1. Disability index score of 40% or less for the LEFS to assist with reaching prior level of function.    [] Progressing: [] Met: [] Not Met: [] Adjusted 2. Patient will demonstrate increased AROM to R knee ext to -2 and flex to 110  to allow for proper joint functioning as indicated by patients Functional Deficits. [x] Progressing: [] Met: [] Not Met: [] Adjusted  3. Patient will demonstrate an increase in Strength to good proximal hip strength and control, within 5lb HHD in LE to allow for proper functional mobility as indicated by patients Functional Deficits. [] Progressing: [] Met: [] Not Met: [] Adjusted  4. Patient will return to being able to ambulate with proper gait without an AD; ascend and descend stairs to basement with reciprocal gait  without increased symptoms or restriction. [] Progressing: [] Met: [] Not Met: [] Adjusted    Progression Towards Functional goals:  [] Patient is progressing as expected towards functional goals listed. [x] Progression is slowed due to complexities listed. [] Progression has been slowed due to co-morbidities. [] Plan just implemented, too soon to assess goals progression  [] Other:         Overall Progression Towards Functional goals/ Treatment Progress Update:  [] Patient is progressing as expected towards functional goals listed. [] Progression is slowed due to complexities/Impairments listed. [] Progression has been slowed due to co-morbidities.   [x] Plan just implemented, too soon to assess goals progression <30days   [] Goals require adjustment due to lack of progress  [] Patient is not progressing as expected and requires additional follow up with physician  [] Other :     Prognosis for POC: [x] Good [] Fair  [] Poor      Patient requires continued skilled intervention: [x] Yes  [] No    Treatment/Activity Tolerance:  [] Patient able to complete treatment  [] Patient limited by fatigue  [x] Patient limited by pain    [] Patient limited by other medical complications  [] Other: ASSESSMENT:  Pt is now being seen s/p joint manipulation. She had a slight regression in her ROM over the weekend from what was achieved on Friday, lacking 10 deg today of KE and only getting 85 deg of KF. She was educated on performing her HEP 3x/day. She would continue to benefit from skilled physical therapy to maximize her functional outcomes and progress towards goals. PLAN: Cont R knee ROM and strength and normalization of gait mechanics. [] Continue per plan of care [x] Alter current plan (see comments above)  [] Plan of care initiated [] Hold pending MD visit [] Discharge      Electronically signed by:  Paula Hodge, PT, DPT, [facility] [id number]     Note: If patient does not return for scheduled/ recommended follow up visits, this note will serve as a discharge from care along with most recent update on progress.

## 2021-01-26 ENCOUNTER — HOSPITAL ENCOUNTER (OUTPATIENT)
Dept: PHYSICAL THERAPY | Age: 71
Setting detail: THERAPIES SERIES
Discharge: HOME | End: 2021-01-26
Payer: MEDICARE

## 2021-01-26 PROCEDURE — 97110 THERAPEUTIC EXERCISES: CPT

## 2021-01-26 PROCEDURE — 97016 VASOPNEUMATIC DEVICE THERAPY: CPT

## 2021-01-26 PROCEDURE — 97140 MANUAL THERAPY 1/> REGIONS: CPT

## 2021-01-26 NOTE — FLOWSHEET NOTE
Is this a Progress Report:     []  Yes  [x]  No        If Yes:  Date Range for reporting period:  Beginning 1/20/2021  Ending    Progress report will be due (10 Rx or 30 days whichever is less): 9/40/0716      Recertification will be due (POC Duration  / 90 days whichever is less): 4/20/2021       Visit # Date Range Insurance Allowable Requires auth   11 12/7/2020 BMN    [x]no        []yes:           SUBJECTIVE:   Patient notes she has been consistent with stretching at home though still feels a lot more stiff in her knee today. Pain level:  0/10     OBJECTIVE: See below    ? Observation: fast mil with RW, lacking full KE in R LE stance phase; Very tight post knee, HS, lateral gastroc. .  ?     PT Practice Pattern:H  Co morbidities:1-2  SURgical R TKR 11/20/20  INITIAL VISIT 12/7/20 CURRENT VISIT 12/16/20 CURRENT VISIT 1/26/20   PAIN 0-10/10   0/10   FUNCTIONAL SCALE LEFS (13/80) 84%  NT   ROM KE -7 -12 -8 deg    KF 43 55 90 deg (on EZ stretch)                               STRENGHT (MMT) Quad tone  trace NT                                              RESTRICTIONS/PRECAUTIONS: OA; osteopenia     Exercises/Interventions:   HEP instruction was provided and handouts given to include:  Access Code: [email or web address]   Patient Portal: [email or web address]      Therapeutic Ex (25689) Reps/Sets/sec Notes/CUES   Reviewed current HEP from home PT  3'                            Prone LLLD KE Towel roll 5'     2x10x3\"             Manual stabilization to try to straighten knee.    4x15\" MC   Seated self FLX EOB 5x20\"                april stretch HS stretch 10x10\" ea    Trustretch KF stretch 10\"x10     Seated hamstring stretch 5x30\"    EZ stretch chair  Flex  Ext   6'  6'   85 deg @ finish  - 12 deg @ finish        Flexion/ext on swiss ball w/OP 10\"x10         Recumbent bike for ROM 8' (F/B)    Pt education x5' Pain, progression, HEP,    Manual Intervention (22658)     STM quad, HS, IRB, PFJ  Patellar mobs GIII TIB fem mobs and oscillations GII-IV with PROM EXT/FLX/STM 25'    Seated MWM for KF      Massage stick to R HS, gastroc with use of hotpack for tissue extensibility                   NMR re-education (32830)  CUES NEEDED   Gait training with rolling walker/cane/no AD x3' Symmetrical step length, KE in stance phase, heel strike   painful           SLR MWM for KE 2x10  Good tolerance                        Therapeutic Activity (17973)                                         Therapeutic Exercise and NMR EXR  [x] (37202) Provided verbal/tactile cueing for activities related to strengthening, flexibility, endurance, ROM for improvements in LE, proximal hip, and core control with self care, mobility, lifting, ambulation. [x] (72579) Provided verbal/tactile cueing for activities related to improving balance, coordination, kinesthetic sense, posture, motor skill, proprioception  to assist with LE, proximal hip, and core control in self care, mobility, lifting, ambulation and eccentric single leg control.      NMR and Therapeutic Activities:    [] (49126 or 93153) Provided verbal/tactile cueing for activities related to improving balance, coordination, kinesthetic sense, posture, motor skill, proprioception and motor activation to allow for proper function of core, proximal hip and LE with self care and ADLs  [x] (20467) Gait Re-education- Provided training and instruction to the patient for proper LE, core and proximal hip recruitment and positioning and eccentric body weight control with ambulation re-education including up and down stairs     Home Exercise Program:    [x] (18760) Reviewed/Progressed HEP activities related to strengthening, flexibility, endurance, ROM of core, proximal hip and LE for functional self-care, mobility, lifting and ambulation/stair navigation [] (92924)Reviewed/Progressed HEP activities related to improving balance, coordination, kinesthetic sense, posture, motor skill, proprioception of core, proximal hip and LE for self care, mobility, lifting, and ambulation/stair navigation      Manual Treatments:  PROM / STM / Oscillations-Mobs:  G-I, II, III, IV (PA's, Inf., Post.)  [x] (54626) Provided manual therapy to mobilize LE, proximal hip and/or LS spine soft tissue/joints for the purpose of modulating pain, promoting relaxation,  increasing ROM, reducing/eliminating soft tissue swelling/inflammation/restriction, improving soft tissue extensibility and allowing for proper ROM for normal function with self care, mobility, lifting and ambulation. Modalities:     [x] GAME READY (VASO)- for significant edema, swelling, pain control. x10'    Charges:  Timed Code Treatment Minutes: 61'   Total Treatment Minutes: 79'         [] EVAL (LOW) 455 1011   [] EVAL (MOD) 39039  [] EVAL (HIGH) 92162   [] RE-EVAL     [x] BQ(33488) x 2    [] IONTO  [] NMR (67307)  x    [x] VASO  [x] Manual (68197) x2      [] Other:  [] TA x      [] Mech Traction (10543)  [] ES(attended) (19810)      [] ES (un) (10852):         GOALS:  Patient stated goal: be able to squat; bend knee and no pain   [x] Progressing: [] Met: [] Not Met: [] Adjusted    Therapist goals for Patient:   Short Term Goals: To be achieved in: 2 weeks  1. Independent in HEP and progression per patient tolerance, in order to prevent re-injury. [x] Progressing: [] Met: [] Not Met: [] Adjusted  2. Patient will have a decrease in pain to facilitate improvement in movement, function, and ADLs as indicated by Functional Deficits. [x] Progressing: [] Met: [] Not Met: [] Adjusted    Long Term Goals: To be achieved in: 8-10 weeks  1. Disability index score of 40% or less for the LEFS to assist with reaching prior level of function.    [] Progressing: [] Met: [] Not Met: [] Adjusted 2. Patient will demonstrate increased AROM to R knee ext to -2 and flex to 110  to allow for proper joint functioning as indicated by patients Functional Deficits. [x] Progressing: [] Met: [] Not Met: [] Adjusted  3. Patient will demonstrate an increase in Strength to good proximal hip strength and control, within 5lb HHD in LE to allow for proper functional mobility as indicated by patients Functional Deficits. [] Progressing: [] Met: [] Not Met: [] Adjusted  4. Patient will return to being able to ambulate with proper gait without an AD; ascend and descend stairs to basement with reciprocal gait  without increased symptoms or restriction. [] Progressing: [] Met: [] Not Met: [] Adjusted    Progression Towards Functional goals:  [] Patient is progressing as expected towards functional goals listed. [x] Progression is slowed due to complexities listed. [] Progression has been slowed due to co-morbidities. [] Plan just implemented, too soon to assess goals progression  [] Other:         Overall Progression Towards Functional goals/ Treatment Progress Update:  [] Patient is progressing as expected towards functional goals listed. [] Progression is slowed due to complexities/Impairments listed. [] Progression has been slowed due to co-morbidities.   [x] Plan just implemented, too soon to assess goals progression <30days   [] Goals require adjustment due to lack of progress  [] Patient is not progressing as expected and requires additional follow up with physician  [] Other :     Prognosis for POC: [x] Good [] Fair  [] Poor      Patient requires continued skilled intervention: [x] Yes  [] No    Treatment/Activity Tolerance:  [] Patient able to complete treatment  [] Patient limited by fatigue  [x] Patient limited by pain    [] Patient limited by other medical complications  [] Other: ASSESSMENT:  Pt is now being seen s/p joint manipulation day 7. She had a slight regression in her ROM over the weekend from what was achieved on Friday, lacking 8 deg today of KE and only getting 90 deg of KF. She was educated on performing her HEP multiple x/day and positioning techniques for LLLD stretching. She would continue to benefit from skilled physical therapy to maximize her functional outcomes and progress towards goals. PLAN: Cont R knee ROM and strength and normalization of gait mechanics. [] Continue per plan of care [x] Alter current plan (see comments above)  [] Plan of care initiated [] Hold pending MD visit [] Discharge      Electronically signed by:  Reena Guillory, PT, DPT, [facility] [id number]     Note: If patient does not return for scheduled/ recommended follow up visits, this note will serve as a discharge from care along with most recent update on progress.

## 2021-01-27 ENCOUNTER — HOSPITAL ENCOUNTER (OUTPATIENT)
Dept: PHYSICAL THERAPY | Age: 71
Setting detail: THERAPIES SERIES
Discharge: HOME | End: 2021-01-27
Payer: MEDICARE

## 2021-01-27 PROCEDURE — G0283 ELEC STIM OTHER THAN WOUND: HCPCS | Performed by: PHYSICAL THERAPIST

## 2021-01-27 PROCEDURE — 97110 THERAPEUTIC EXERCISES: CPT | Performed by: PHYSICAL THERAPIST

## 2021-01-27 PROCEDURE — 97016 VASOPNEUMATIC DEVICE THERAPY: CPT | Performed by: PHYSICAL THERAPIST

## 2021-01-27 PROCEDURE — 97140 MANUAL THERAPY 1/> REGIONS: CPT | Performed by: PHYSICAL THERAPIST

## 2021-01-27 NOTE — FLOWSHEET NOTE
[facility], [address]  Phone: [phone]  Fax [phone]        Physical Therapy Treatment Note/ Progress Report:           Date:  2021    Patient Name:  Haris Tafoya    :  1950  MRN: [id number]  Restrictions/Precautions:    Medical/Treatment Diagnosis Information:  Diagnosis: s/p r TKR sx date 2020 M25.561; M17.11  Treatment Diagnosis: R knee pain J65.597  Insurance/Certification information:  PT Insurance Information: /[employer]  Physician Information:  Referring Practitioner: Dr. Chace Moreau  Has the plan of care been signed (Y/N):        []  Yes  [x]  No     Date of Patient follow up with Physician: 2021      Is this a Progress Report:     []  Yes  [x]  No        If Yes:  Date Range for reporting period:  Beginning 2021  Ending     Progress report will be due (10 Rx or 30 days whichever is less):       Recertification will be due (POC Duration  / 90 days whichever is less): 2021       Visit # Date Range Insurance Allowable Requires auth   12 2020 BMN    [x]no        []yes:           SUBJECTIVE:   Pt notes general soreness and stiffness from the bruising and the surgery. Pain: 10    OBJECTIVE: See below     Observation:        PT Practice Pattern:H  Co morbidities:1-2  SURgical R TKR 20  INITIAL VISIT 20 CURRENT VISIT 20 CURRENT VISIT 20   PAIN 0-10/10   0/10   FUNCTIONAL SCALE LEFS () 84%  NT   ROM KE -7 -12 -8 deg    KF 43 55 90 deg (on EZ stretch)                               STRENGHT (MMT) Quad tone  trace NT                                              RESTRICTIONS/PRECAUTIONS: OA; osteopenia     Exercises/Interventions:   HEP instruction was provided and handouts given to include:  Access Code: [email or web address]   Patient Portal: [employer]/      Therapeutic Ex (11222) Reps/Sets/sec Notes/CUES Prone LLLD KE Towel roll 5'     2x10x3\"        Prone LLLD stretch with ESU and HP  x10'    Manual stabilization to try to straighten knee. 4x15\" MC   Seated self FLX EOB 5x20\"                                       Pt education x5' Pain, progression, HEP,    Manual Intervention (93156)     STM quad, PFJ, gastroc and HS 6'    IASTM pot ESU and MHP HS 8'    prone manual stretch for KE 4'    prone R ant hip mobs GIII 3'   GII-III tib/fem mobs for KE/KF 3'         NMR re-education (47850)  CUES NEEDED   Gait training with rolling walker/cane/no AD x3' Symmetrical step length, KE in stance phase, heel strike   painful         Good tolerance                        Therapeutic Activity (96795)                                         Therapeutic Exercise and NMR EXR  [x] (27529) Provided verbal/tactile cueing for activities related to strengthening, flexibility, endurance, ROM for improvements in LE, proximal hip, and core control with self care, mobility, lifting, ambulation. [x] (87733) Provided verbal/tactile cueing for activities related to improving balance, coordination, kinesthetic sense, posture, motor skill, proprioception  to assist with LE, proximal hip, and core control in self care, mobility, lifting, ambulation and eccentric single leg control.      NMR and Therapeutic Activities:    [] (43699 or 57419) Provided verbal/tactile cueing for activities related to improving balance, coordination, kinesthetic sense, posture, motor skill, proprioception and motor activation to allow for proper function of core, proximal hip and LE with self care and ADLs  [x] (13500) Gait Re-education- Provided training and instruction to the patient for proper LE, core and proximal hip recruitment and positioning and eccentric body weight control with ambulation re-education including up and down stairs     Home Exercise Program:    [x] (88677) Reviewed/Progressed HEP activities related to strengthening, flexibility, endurance, less for the LEFS to assist with reaching prior level of function. [] Progressing: [] Met: [] Not Met: [] Adjusted  2. Patient will demonstrate increased AROM to R knee ext to -2 and flex to 110  to allow for proper joint functioning as indicated by patients Functional Deficits. [x] Progressing: [] Met: [] Not Met: [] Adjusted  3. Patient will demonstrate an increase in Strength to good proximal hip strength and control, within 5lb HHD in LE to allow for proper functional mobility as indicated by patients Functional Deficits. [] Progressing: [] Met: [] Not Met: [] Adjusted  4. Patient will return to being able to ambulate with proper gait without an AD; ascend and descend stairs to basement with reciprocal gait  without increased symptoms or restriction. [] Progressing: [] Met: [] Not Met: [] Adjusted    Progression Towards Functional goals:  [] Patient is progressing as expected towards functional goals listed. [x] Progression is slowed due to complexities listed. [] Progression has been slowed due to co-morbidities. [] Plan just implemented, too soon to assess goals progression  [] Other:         Overall Progression Towards Functional goals/ Treatment Progress Update:  [] Patient is progressing as expected towards functional goals listed. [] Progression is slowed due to complexities/Impairments listed. [] Progression has been slowed due to co-morbidities.   [x] Plan just implemented, too soon to assess goals progression <30days   [] Goals require adjustment due to lack of progress  [] Patient is not progressing as expected and requires additional follow up with physician  [] Other :     Prognosis for POC: [x] Good [] Fair  [] Poor      Patient requires continued skilled intervention: [x] Yes  [] No    Treatment/Activity Tolerance:  [] Patient able to complete treatment  [] Patient limited by fatigue  [x] Patient limited by pain    [] Patient limited by other medical complications  [] Other: ASSESSMENT:  Pt is now being seen s/p joint manipulation day 8. She had a slight regression in her ROM over the weekend from what was achieved on Friday. Her ROM did not improve this visit. Tino Macdonald appears to have almost spasming of the HS and quads with ROM. She did respond well to the premod used in a stretch position. Pt is ideal for TDN HOWEVER she is too close to the date of sx. She would continue to benefit from skilled physical therapy to maximize her functional outcomes and progress towards goals. PLAN: Cont R knee ROM and strength and normalization of gait mechanics. [] Continue per plan of care [x] Alter current plan (see comments above)  [] Plan of care initiated [] Hold pending MD visit [] Discharge      Electronically signed by:  Mi Bautista, PT, [id number]     Note: If patient does not return for scheduled/ recommended follow up visits, this note will serve as a discharge from care along with most recent update on progress.

## 2021-01-28 ENCOUNTER — HOSPITAL ENCOUNTER (OUTPATIENT)
Dept: PHYSICAL THERAPY | Age: 71
Setting detail: THERAPIES SERIES
Discharge: HOME | End: 2021-01-28
Payer: MEDICARE

## 2021-01-28 PROCEDURE — G0283 ELEC STIM OTHER THAN WOUND: HCPCS

## 2021-01-28 PROCEDURE — 97112 NEUROMUSCULAR REEDUCATION: CPT

## 2021-01-28 PROCEDURE — 97016 VASOPNEUMATIC DEVICE THERAPY: CPT

## 2021-01-28 PROCEDURE — 97110 THERAPEUTIC EXERCISES: CPT

## 2021-01-28 PROCEDURE — 97140 MANUAL THERAPY 1/> REGIONS: CPT

## 2021-01-28 NOTE — FLOWSHEET NOTE
[facility], [address]  Phone: [phone]  Fax [phone]        Physical Therapy Treatment Note/ Progress Report:           Date:  2021    Patient Name:  Bhavana Colon    :  1950  MRN: [id number]  Restrictions/Precautions:    Medical/Treatment Diagnosis Information:  Diagnosis: s/p r TKR sx date 2020 M25.561; M17.11  Treatment Diagnosis: R knee pain D20.533  Insurance/Certification information:  PT Insurance Information: /[employer]  Physician Information:  Referring Practitioner: Dr. Sofi Tyler  Has the plan of care been signed (Y/N):        []  Yes  [x]  No     Date of Patient follow up with Physician: 2021      Is this a Progress Report:     []  Yes  [x]  No        If Yes:  Date Range for reporting period:  Beginning 2021  Ending     Progress report will be due (10 Rx or 30 days whichever is less):       Recertification will be due (POC Duration  / 90 days whichever is less): 2021       Visit # Date Range Insurance Allowable Requires auth   13 2020 BMN    [x]no        []yes:           SUBJECTIVE:   Pt reports the expected soreness in her knee after PT yesterday. Pain: 4/10    OBJECTIVE: See below    ? Observation:   ? PT Practice Pattern:H  Co morbidities:1-2  SURgical R TKR 20  INITIAL VISIT 20 CURRENT VISIT 20 CURRENT VISIT 20   PAIN 0-10/10   0/10   FUNCTIONAL SCALE LEFS () 84%  NT   ROM KE -7 -12 -15 deg    KF 43 55 90 deg (on EZ stretch)                               STRENGHT (MMT) Quad tone  trace NT                                              RESTRICTIONS/PRECAUTIONS: OA; osteopenia     Exercises/Interventions:   HEP instruction was provided and handouts given to include:  Access Code: [email or web address]   Patient Portal: [email or web address]/      Therapeutic Ex (84626) Reps/Sets/sec Notes/CUES Sitting gastroc stretch with belt  3x30\"               Prone LLLD KE Towel roll 5'     2x10x3\"    Seated HP gastroc stretch with strap combo HS stretch    Prone LLLD stretch with ESU and HP  x10'    Manual stabilization to try to straighten knee. 4x15\" MC    5x20\"                                       Pt education x5' Pain, progression, HEP,    Manual Intervention ([id number])     STM quad, PFJ, gastroc and HS 6'    IASTM pot ESU and MHP HS 8'    prone manual stretch for KE 4'     3'   GII-IV tib/fem mobs for KE 3'         NMR re-education (31326)  CUES NEEDED   Gait training with rolling walker/cane/no AD x3' Symmetrical step length, KE in stance phase, heel strike   NMES    Seated QS  SLRBiphasic 10\"/10\" 2.0 ramp  5'  5'           Good tolerance         Reformer KF, KE with OP for KE 20x ea PT OP for KE             Therapeutic Activity (25749)                                         Therapeutic Exercise and NMR EXR  [x] (05044) Provided verbal/tactile cueing for activities related to strengthening, flexibility, endurance, ROM for improvements in LE, proximal hip, and core control with self care, mobility, lifting, ambulation. [x] (37573) Provided verbal/tactile cueing for activities related to improving balance, coordination, kinesthetic sense, posture, motor skill, proprioception  to assist with LE, proximal hip, and core control in self care, mobility, lifting, ambulation and eccentric single leg control.      NMR and Therapeutic Activities:    [] (65061 or 76891) Provided verbal/tactile cueing for activities related to improving balance, coordination, kinesthetic sense, posture, motor skill, proprioception and motor activation to allow for proper function of core, proximal hip and LE with self care and ADLs [x] (01705) Gait Re-education- Provided training and instruction to the patient for proper LE, core and proximal hip recruitment and positioning and eccentric body weight control with ambulation re-education including up and down stairs     Home Exercise Program:    [x] (65748) Reviewed/Progressed HEP activities related to strengthening, flexibility, endurance, ROM of core, proximal hip and LE for functional self-care, mobility, lifting and ambulation/stair navigation   [] (50523)Reviewed/Progressed HEP activities related to improving balance, coordination, kinesthetic sense, posture, motor skill, proprioception of core, proximal hip and LE for self care, mobility, lifting, and ambulation/stair navigation      Manual Treatments:  PROM / STM / Oscillations-Mobs:  G-I, II, III, IV (PA's, Inf., Post.)  [x] (27777) Provided manual therapy to mobilize LE, proximal hip and/or LS spine soft tissue/joints for the purpose of modulating pain, promoting relaxation,  increasing ROM, reducing/eliminating soft tissue swelling/inflammation/restriction, improving soft tissue extensibility and allowing for proper ROM for normal function with self care, mobility, lifting and ambulation. Modalities: prone premod Low with progressive KE with MHP x10'    [x] GAME READY (VASO)- for significant edema, swelling, pain control. x10' VASO + HVPC for edema/swelling/pain    Charges:  Timed Code Treatment Minutes: 55   Total Treatment Minutes: 65         [] EVAL (LOW) 42225   [] EVAL (MOD) 80562  [] EVAL (HIGH) 89425   [] RE-EVAL     [x] DI(95229) x 1    [] IONTO  [x] NMR (66813)  x 1   [x] VASO  [x] Manual (47937) x2      [] Other:  [] TA x      [] Mech Traction (87668)  [] ES(attended) (86499)      [x] ES (un) (47085): NMES and HVPC        GOALS:  Patient stated goal: be able to squat; bend knee and no pain   [x] Progressing: [] Met: [] Not Met: [] Adjusted    Therapist goals for Patient:   Short Term Goals:  To be achieved in: 2 weeks 1. Independent in HEP and progression per patient tolerance, in order to prevent re-injury. [x] Progressing: [] Met: [] Not Met: [] Adjusted  2. Patient will have a decrease in pain to facilitate improvement in movement, function, and ADLs as indicated by Functional Deficits. [x] Progressing: [] Met: [] Not Met: [] Adjusted    Long Term Goals: To be achieved in: 8-10 weeks  1. Disability index score of 40% or less for the LEFS to assist with reaching prior level of function. [] Progressing: [] Met: [] Not Met: [] Adjusted  2. Patient will demonstrate increased AROM to R knee ext to -2 and flex to 110  to allow for proper joint functioning as indicated by patients Functional Deficits. [x] Progressing: [] Met: [] Not Met: [] Adjusted  3. Patient will demonstrate an increase in Strength to good proximal hip strength and control, within 5lb HHD in LE to allow for proper functional mobility as indicated by patients Functional Deficits. [] Progressing: [] Met: [] Not Met: [] Adjusted  4. Patient will return to being able to ambulate with proper gait without an AD; ascend and descend stairs to basement with reciprocal gait  without increased symptoms or restriction. [] Progressing: [] Met: [] Not Met: [] Adjusted    Progression Towards Functional goals:  [] Patient is progressing as expected towards functional goals listed. [x] Progression is slowed due to complexities listed. [] Progression has been slowed due to co-morbidities. [] Plan just implemented, too soon to assess goals progression  [] Other:         Overall Progression Towards Functional goals/ Treatment Progress Update:  [] Patient is progressing as expected towards functional goals listed. [] Progression is slowed due to complexities/Impairments listed. [] Progression has been slowed due to co-morbidities.   [x] Plan just implemented, too soon to assess goals progression <30days   [] Goals require adjustment due to lack of progress [] Patient is not progressing as expected and requires additional follow up with physician  [] Other :     Prognosis for POC: [x] Good [] Fair  [] Poor      Patient requires continued skilled intervention: [x] Yes  [] No    Treatment/Activity Tolerance:  [] Patient able to complete treatment  [] Patient limited by fatigue  [x] Patient limited by pain    [] Patient limited by other medical complications  [] Other:     ASSESSMENT:  Pt is now being seen s/p joint manipulation day 9. She had a slight regression in her ROM over the weekend from what was achieved last Friday. Focus today was on improving KE with manual therapy followed by functional strengthening of her quads. Unfortunately, her ROM did not improve this visit and she is lacking 15 deg of KE. Patient was educated on continued aggressive stretching at home. She would continue to benefit from skilled physical therapy to maximize her functional outcomes and progress towards goals. PLAN: Cont R knee ROM and strength and normalization of gait mechanics. Op note NV.   [] Continue per plan of care [x] Alter current plan (see comments above)  [] Plan of care initiated [] Hold pending MD visit [] Discharge      Electronically signed by:  Marlyn Iqbal, PT, DPT, [facility] [id number]      Note: If patient does not return for scheduled/ recommended follow up visits, this note will serve as a discharge from care along with most recent update on progress.

## 2021-01-29 ENCOUNTER — OFFICE VISIT (OUTPATIENT)
Dept: ORTHOPEDIC SURGERY | Age: 71
End: 2021-01-29

## 2021-01-29 ENCOUNTER — APPOINTMENT (OUTPATIENT)
Dept: PHYSICAL THERAPY | Age: 71
End: 2021-01-29
Payer: MEDICARE

## 2021-01-29 ENCOUNTER — HOSPITAL ENCOUNTER (OUTPATIENT)
Dept: PHYSICAL THERAPY | Age: 71
Setting detail: THERAPIES SERIES
Discharge: HOME | End: 2021-01-29
Payer: MEDICARE

## 2021-01-29 VITALS — BODY MASS INDEX: 31.1 KG/M2 | WEIGHT: 169 LBS | HEIGHT: 62 IN

## 2021-01-29 DIAGNOSIS — M24.661 FIBROSIS OF RIGHT KNEE JOINT: Primary | ICD-10-CM

## 2021-01-29 PROCEDURE — 97140 MANUAL THERAPY 1/> REGIONS: CPT

## 2021-01-29 PROCEDURE — 97110 THERAPEUTIC EXERCISES: CPT

## 2021-01-29 PROCEDURE — 99024 POSTOP FOLLOW-UP VISIT: CPT | Performed by: ORTHOPAEDIC SURGERY

## 2021-01-29 PROCEDURE — 97016 VASOPNEUMATIC DEVICE THERAPY: CPT

## 2021-01-29 PROCEDURE — 97112 NEUROMUSCULAR REEDUCATION: CPT

## 2021-01-29 NOTE — PROGRESS NOTES
ADMIT DATE: 01/19/2021  PROVIDER:     Jerardo Cummings MD     DATE OF PROCEDURE:  01/19/2021     SERVICE:  Orthopedic Surgery.     SURGEON:  Excell Blizzard, MD     FIRST ASSISTANT:  Reinaldo Antonio     PREOPERATIVE DIAGNOSIS:  Arthrofibrosis, right knee, status post right  total knee replacement of 11/20/2020.     POSTOPERATIVE DIAGNOSIS:  Arthrofibrosis, right knee, status post right  total knee replacement of 11/20/2020.     PROCEDURE:  1. Exam under anesthesia and video arthroscopy, right knee. 2.  Extensive tricompartmental synovectomy with resection of adhesions  and release of contracture of peripatellar region medially and  laterally. 3.  Manipulation under anesthesia. Patient returns after her 1/19/2021 manipulation of the right knee. She which is doing better. Taking occasional pain pill. Is been working every day and physical therapy. Portals are completely benign. Her range of motion today is 7-90. Signs of infection or DVT. The calf is soft and nontender. She needs more physical therapy and aggressive physical therapy. I reiterated the extreme importance of getting moving both in flexion and extension. She needs to be seen in 2 weeks. I have personally performed and/or participated in the history, exam and medical decision making and agree with all pertinent clinical information. I have also reviewed and agree with the past medical, family and social history unless otherwise noted. This dictation was performed with a verbal recognition program (DRAGON) and it was checked for errors. It is possible that there are still dictated errors within this office note. If so, please bring any errors to my attention for an addendum. All efforts were made to ensure that this office note is accurate.           Jerardo Cummings MD

## 2021-01-29 NOTE — OP NOTE
[facility],  [address]  Phone: [phone]  Fax [phone]        Physical Therapy Treatment Note/ Progress Report:           Date:  2021    Patient Name:  Ananya Blake    :  1950  MRN: [id number]  Restrictions/Precautions:    Medical/Treatment Diagnosis Information:  Diagnosis: s/p r TKR sx date 2020 M25.561; M17.11  Treatment Diagnosis: R knee pain O02.590  Insurance/Certification information:  PT Insurance Information: /[employer]  Physician Information:  Referring Practitioner: Dr. Israel Edu  Has the plan of care been signed (Y/N):        []  Yes  [x]  No     Date of Patient follow up with Physician: 2021      Is this a Progress Report:     []  Yes  [x]  No        If Yes:  Date Range for reporting period:  Beginning 2021  Ending     Progress report will be due (10 Rx or 30 days whichever is less): 559      Recertification will be due (POC Duration  / 90 days whichever is less): 2021       Visit # Date Range Insurance Allowable Requires auth   14 2020 BMN    [x]no        []yes:           SUBJECTIVE:   Pt reports continued soreness in her R knee but not too bad this morning. Pain: 10    OBJECTIVE: See below    ? Observation:   ?      PT Practice Pattern:H  Co morbidities:1-2  SURgical R TKR 20  INITIAL VISIT 20 CURRENT VISIT 20 CURRENT VISIT 20   PAIN 0-10/10   0/10   FUNCTIONAL SCALE LEFS () 84%  NT   ROM KE -7 -12 -10-15 deg    KF 43 55 90 deg (on EZ stretch)                               STRENGHT (MMT) Quad tone  trace NT                                              RESTRICTIONS/PRECAUTIONS: OA; osteopenia       Therapeutic Exercise and NMR EXR [x] (89814) Provided verbal/tactile cueing for activities related to strengthening, flexibility, endurance, ROM for improvements in LE, proximal hip, and core control with self care, mobility, lifting, ambulation. [x] (49783) Provided verbal/tactile cueing for activities related to improving balance, coordination, kinesthetic sense, posture, motor skill, proprioception  to assist with LE, proximal hip, and core control in self care, mobility, lifting, ambulation and eccentric single leg control.      NMR and Therapeutic Activities:    [] (66413 or 02183) Provided verbal/tactile cueing for activities related to improving balance, coordination, kinesthetic sense, posture, motor skill, proprioception and motor activation to allow for proper function of core, proximal hip and LE with self care and ADLs  [x] (52714) Gait Re-education- Provided training and instruction to the patient for proper LE, core and proximal hip recruitment and positioning and eccentric body weight control with ambulation re-education including up and down stairs     Home Exercise Program:    [x] (81808) Reviewed/Progressed HEP activities related to strengthening, flexibility, endurance, ROM of core, proximal hip and LE for functional self-care, mobility, lifting and ambulation/stair navigation   [] (73655)Reviewed/Progressed HEP activities related to improving balance, coordination, kinesthetic sense, posture, motor skill, proprioception of core, proximal hip and LE for self care, mobility, lifting, and ambulation/stair navigation      Manual Treatments:  PROM / STM / Oscillations-Mobs:  G-I, II, III, IV (PA's, Inf., Post.) [x] (18517) Provided manual therapy to mobilize LE, proximal hip and/or LS spine soft tissue/joints for the purpose of modulating pain, promoting relaxation,  increasing ROM, reducing/eliminating soft tissue swelling/inflammation/restriction, improving soft tissue extensibility and allowing for proper ROM for normal function with self care, mobility, lifting and ambulation. Modalities:   [x] GAME READY (VASO)- for significant edema, swelling, pain control. x10' VASO with R heel prop    Charges:  Timed Code Treatment Minutes: 46   Total Treatment Minutes: 56         [] EVAL (LOW) 43795   [] EVAL (MOD) 39131  [] EVAL (HIGH) 68714   [] RE-EVAL     [x] AH(17416) x 1    [] IONTO  [x] NMR (92071)  x 1   [x] VASO  [x] Manual (42913) x1      [] Other:  [] TA x      [] Mech Traction (98954)  [] ES(attended) (86856)      [] ES (un) (15577): NMES and HVPC        GOALS:  Patient stated goal: be able to squat; bend knee and no pain   [x] Progressing: [] Met: [] Not Met: [] Adjusted    Therapist goals for Patient:   Short Term Goals: To be achieved in: 2 weeks  1. Independent in HEP and progression per patient tolerance, in order to prevent re-injury. [x] Progressing: [] Met: [] Not Met: [] Adjusted  2. Patient will have a decrease in pain to facilitate improvement in movement, function, and ADLs as indicated by Functional Deficits. [x] Progressing: [] Met: [] Not Met: [] Adjusted    Long Term Goals: To be achieved in: 8-10 weeks  1. Disability index score of 40% or less for the LEFS to assist with reaching prior level of function. [] Progressing: [] Met: [] Not Met: [] Adjusted  2. Patient will demonstrate increased AROM to R knee ext to -2 and flex to 110  to allow for proper joint functioning as indicated by patients Functional Deficits.    [x] Progressing: [] Met: [] Not Met: [] Adjusted 3. Patient will demonstrate an increase in Strength to good proximal hip strength and control, within 5lb HHD in LE to allow for proper functional mobility as indicated by patients Functional Deficits. [] Progressing: [] Met: [] Not Met: [] Adjusted  4. Patient will return to being able to ambulate with proper gait without an AD; ascend and descend stairs to basement with reciprocal gait  without increased symptoms or restriction. [] Progressing: [] Met: [] Not Met: [] Adjusted    Progression Towards Functional goals:  [] Patient is progressing as expected towards functional goals listed. [x] Progression is slowed due to complexities listed. [] Progression has been slowed due to co-morbidities. [] Plan just implemented, too soon to assess goals progression  [] Other:         Overall Progression Towards Functional goals/ Treatment Progress Update:  [] Patient is progressing as expected towards functional goals listed. [] Progression is slowed due to complexities/Impairments listed. [] Progression has been slowed due to co-morbidities.   [x] Plan just implemented, too soon to assess goals progression <30days   [] Goals require adjustment due to lack of progress  [] Patient is not progressing as expected and requires additional follow up with physician  [] Other :     Prognosis for POC: [x] Good [] Fair  [] Poor      Patient requires continued skilled intervention: [x] Yes  [] No    Treatment/Activity Tolerance:  [] Patient able to complete treatment  [] Patient limited by fatigue  [x] Patient limited by pain    [] Patient limited by other medical complications  [] Other: ASSESSMENT:  Pt is now being seen s/p joint manipulation day 10. She had a slight regression in her ROM over the weekend from what was achieved last Friday. Focus today was on improving KE with manual therapy followed by functional strengthening of her quads. Unfortunately, her ROM did not improve this visit and she is lacking 10-15 deg of KE. Patient was educated on continued aggressive stretching at home. Next week she is scheduled at 3x/week but has her follow up today with the surgeon who may recommend otherwise. She would continue to benefit from skilled physical therapy to maximize her functional outcomes and progress towards goals. PLAN: Cont R knee ROM and strength and normalization of gait mechanics. [] Continue per plan of care [x] Alter current plan (see comments above)  [] Plan of care initiated [] Hold pending MD visit [] Discharge      Electronically signed by:  Ronald Redding PT, DPT, [facility] [id number]      Note: If patient does not return for scheduled/ recommended follow up visits, this note will serve as a discharge from care along with most recent update on progress.

## 2021-01-29 NOTE — FLOWSHEET NOTE
[facility],  [address]  Phone: [phone]  Fax [phone]        Physical Therapy Treatment Note/ Progress Report:           Date:  2021    Patient Name:  Giuliana Batista    :  1950  MRN: [id number]  Restrictions/Precautions:    Medical/Treatment Diagnosis Information:  Diagnosis: s/p r TKR sx date 2020 M25.561; M17.11  Treatment Diagnosis: R knee pain T92.495  Insurance/Certification information:  PT Insurance Information: /[employer]  Physician Information:  Referring Practitioner: Dr. Abdullahi Kerr  Has the plan of care been signed (Y/N):        []  Yes  [x]  No     Date of Patient follow up with Physician: 2021      Is this a Progress Report:     []  Yes  [x]  No        If Yes:  Date Range for reporting period:  Beginning 2021  Ending     Progress report will be due (10 Rx or 30 days whichever is less):       Recertification will be due (POC Duration  / 90 days whichever is less): 2021       Visit # Date Range Insurance Allowable Requires auth   14 2020 BMN    [x]no        []yes:           SUBJECTIVE:   Pt reports continued soreness in her R knee but not too bad this morning. Pain: 4/10    OBJECTIVE: See below    ? Observation:   ? PT Practice Pattern:H  Co morbidities:1-2  SURgical R TKR 20  INITIAL VISIT 20 CURRENT VISIT 20 CURRENT VISIT 20   PAIN 0-10/10   0/10   FUNCTIONAL SCALE LEFS () 84%  NT   ROM KE -7 -12 -10-15 deg    KF 43 55 90 deg (on EZ stretch)                               STRENGHT (MMT) Quad tone  trace NT                                              RESTRICTIONS/PRECAUTIONS: OA; osteopenia     Exercises/Interventions:   HEP instruction was provided and handouts given to include:  Access Code: [email or web address]   Patient Portal: [employer]. Standardized Safety/      Therapeutic Ex (81101) Reps/Sets/sec Notes/CUES Sitting gastroc stretch with belt  3x30\"               Prone LLLD KE  5' On 1/2 foam roll    2x10x3\"    Seated gastroc stretch with strap combo HS stretch with heel prop1'x5    Prone LLLD stretch with ESU and HP       Manual stabilization to try to straighten knee. 4x15\" MC    5x20\"                         EZ stretch chair  Flex  Ext   6'  6'   85 deg @ finish  - 22 deg @ finish                 Pt education x5' Pain, progression, HEP,    Manual Intervention ([id number])     STM quad, PFJ, gastroc and HS 6'    IASTM R HS, gastroc 8'    prone manual stretch for KE with contract/relax stetch 4'     3'   GII-IV tib/fem mobs for KE 3'         NMR re-education (04724)  CUES NEEDED   Gait training with rolling walker/cane/no AD x3' Symmetrical step length, KE in stance phase, heel strike   Biphasic 10\"/10\" 2.0 ramp  5'  5'           Good tolerance         Reformer KF, KE with OP for KE 20x ea PT OP for KE   Step up fwd with TKE focus 10x, 5\"H         Therapeutic Activity (96067)                                         Therapeutic Exercise and NMR EXR  [x] (14125) Provided verbal/tactile cueing for activities related to strengthening, flexibility, endurance, ROM for improvements in LE, proximal hip, and core control with self care, mobility, lifting, ambulation. [x] (83198) Provided verbal/tactile cueing for activities related to improving balance, coordination, kinesthetic sense, posture, motor skill, proprioception  to assist with LE, proximal hip, and core control in self care, mobility, lifting, ambulation and eccentric single leg control.      NMR and Therapeutic Activities:    [] (53546 or 67666) Provided verbal/tactile cueing for activities related to improving balance, coordination, kinesthetic sense, posture, motor skill, proprioception and motor activation to allow for proper function of core, proximal hip and LE with self care and ADLs [x] (95250) Gait Re-education- Provided training and instruction to the patient for proper LE, core and proximal hip recruitment and positioning and eccentric body weight control with ambulation re-education including up and down stairs     Home Exercise Program:    [x] (80727) Reviewed/Progressed HEP activities related to strengthening, flexibility, endurance, ROM of core, proximal hip and LE for functional self-care, mobility, lifting and ambulation/stair navigation   [] (34285)Reviewed/Progressed HEP activities related to improving balance, coordination, kinesthetic sense, posture, motor skill, proprioception of core, proximal hip and LE for self care, mobility, lifting, and ambulation/stair navigation      Manual Treatments:  PROM / STM / Oscillations-Mobs:  G-I, II, III, IV (PA's, Inf., Post.)  [x] (38221) Provided manual therapy to mobilize LE, proximal hip and/or LS spine soft tissue/joints for the purpose of modulating pain, promoting relaxation,  increasing ROM, reducing/eliminating soft tissue swelling/inflammation/restriction, improving soft tissue extensibility and allowing for proper ROM for normal function with self care, mobility, lifting and ambulation. Modalities:   [x] GAME READY (VASO)- for significant edema, swelling, pain control. x10' VASO with R heel prop    Charges:  Timed Code Treatment Minutes: 46   Total Treatment Minutes: 56         [] EVAL (LOW) 49658   [] EVAL (MOD) 73883  [] EVAL (HIGH) 51623   [] RE-EVAL     [x] DD(62078) x 1    [] IONTO  [x] NMR (85715)  x 1   [x] VASO  [x] Manual (98044) x1      [] Other:  [] TA x      [] Mech Traction (00656)  [] ES(attended) (48857)      [] ES (un) (91804): NMES and HVPC        GOALS:  Patient stated goal: be able to squat; bend knee and no pain   [x] Progressing: [] Met: [] Not Met: [] Adjusted    Therapist goals for Patient:   Short Term Goals:  To be achieved in: 2 weeks [] Patient is not progressing as expected and requires additional follow up with physician  [] Other :     Prognosis for POC: [x] Good [] Fair  [] Poor      Patient requires continued skilled intervention: [x] Yes  [] No    Treatment/Activity Tolerance:  [] Patient able to complete treatment  [] Patient limited by fatigue  [x] Patient limited by pain    [] Patient limited by other medical complications  [] Other:     ASSESSMENT:  Pt is now being seen s/p joint manipulation day 10. She had a slight regression in her ROM over the weekend from what was achieved last Friday. Focus today was on improving KE with manual therapy followed by functional strengthening of her quads. Unfortunately, her ROM did not improve this visit and she is lacking 10-15 deg of KE. Patient was educated on continued aggressive stretching at home. Next week she is scheduled at 3x/week but has her follow up today with the surgeon who may recommend otherwise. She would continue to benefit from skilled physical therapy to maximize her functional outcomes and progress towards goals. PLAN: Cont R knee ROM and strength and normalization of gait mechanics. [] Continue per plan of care [x] Alter current plan (see comments above)  [] Plan of care initiated [] Hold pending MD visit [] Discharge      Electronically signed by:  Marlyn Iqbal, PT, DPT, [facility] [id number]      Note: If patient does not return for scheduled/ recommended follow up visits, this note will serve as a discharge from care along with most recent update on progress.

## 2021-02-01 ENCOUNTER — HOSPITAL ENCOUNTER (OUTPATIENT)
Dept: PHYSICAL THERAPY | Age: 71
Setting detail: THERAPIES SERIES
Discharge: HOME | End: 2021-02-01
Payer: MEDICARE

## 2021-02-01 PROCEDURE — 97140 MANUAL THERAPY 1/> REGIONS: CPT

## 2021-02-01 PROCEDURE — 97112 NEUROMUSCULAR REEDUCATION: CPT

## 2021-02-01 PROCEDURE — 97110 THERAPEUTIC EXERCISES: CPT

## 2021-02-01 NOTE — FLOWSHEET NOTE
[facility], [address]  Phone: [phone]  Fax [phone]        Physical Therapy Treatment Note/ Progress Report:           Date:  2021    Patient Name:  Scott Hopper    :  1950  MRN: [id number]  Restrictions/Precautions:    Medical/Treatment Diagnosis Information:  Diagnosis: s/p r TKR sx date 2020 M25.561; M17.11  Treatment Diagnosis: R knee pain G42.120  Insurance/Certification information:  PT Insurance Information: /[employer]  Physician Information:  Referring Practitioner: Dr. Wilfrid Barnes  Has the plan of care been signed (Y/N):        []  Yes  [x]  No     Date of Patient follow up with Physician: 2021      Is this a Progress Report:     []  Yes  [x]  No        If Yes:  Date Range for reporting period:  Beginning 2021  Ending     Progress report will be due (10 Rx or 30 days whichever is less):       Recertification will be due (POC Duration  / 90 days whichever is less): 2021       Visit # Date Range Insurance Allowable Requires auth   15 2020 BMN    [x]no        []yes:           SUBJECTIVE:   Pt reports she feels her knee has improved some and she feels she is able to walk with more ease. She saw the MD Friday who is recommending continued aggressive PT. Pain: 4/10    OBJECTIVE: See below    ? Observation:   ?      PT Practice Pattern:H  Co morbidities:1-2  SURgical R TKR 20  INITIAL VISIT 20 CURRENT VISIT 20 CURRENT VISIT 20   PAIN 0-10/10   0/10   FUNCTIONAL SCALE LEFS () 84%  NT   ROM KE -7 -12 -15 deg    KF 43 55 87 deg (on EZ stretch)                               STRENGHT (MMT) Quad tone  trace NT                                              RESTRICTIONS/PRECAUTIONS: OA; osteopenia     Exercises/Interventions:   HEP instruction was provided and handouts given to include:  Access Code: [email or web address] Patient Portal: [email or web address]/      Therapeutic Ex (49797) Reps/Sets/sec Notes/CUES                  Sitting gastroc stretch with belt  3x30\"               Prone LLLD KE  7' On 1/2 foam roll 10#    2x10x3\"    Seated gastroc stretch with strap combo HS stretch with heel prop1'x5    Prone LLLD stretch with ESU and HP       ncline gastroc stretch Manual stabilization to try to straighten knee. 4x15\" MC    5x20\"                      Seated hamstring stretch 5x30\"    EZ stretch chair  Flex  Ext   6'  6'   85 deg @ finish  - 22 deg @ finish              Recumbent bike for ROM 6' (F/B)    Pt education x5' Pain, progression, HEP,    Manual Intervention (83481)     STM quad, PFJ, gastroc and HS 6'    IASTM R HS, gastroc 8'    prone manual stretch for KE with contract/relax stetch 4'     3'   GIII-IV tib/fem mobs for KE 3'         NMR re-education (83237)  CUES NEEDED   Gait training with rolling walker x5' Symmetrical step length, KE in stance phase, heel strike   Biphasic 10\"/10\" 2.0 ramp  5'  5'           Good tolerance          20x ea PT OP for KE   Step up fwd with TKE focus 10x, 5\"H         Therapeutic Activity (52762)                                         Therapeutic Exercise and NMR EXR  [x] (23027) Provided verbal/tactile cueing for activities related to strengthening, flexibility, endurance, ROM for improvements in LE, proximal hip, and core control with self care, mobility, lifting, ambulation. [x] (97357) Provided verbal/tactile cueing for activities related to improving balance, coordination, kinesthetic sense, posture, motor skill, proprioception  to assist with LE, proximal hip, and core control in self care, mobility, lifting, ambulation and eccentric single leg control.      NMR and Therapeutic Activities: [] (19602 or 34365) Provided verbal/tactile cueing for activities related to improving balance, coordination, kinesthetic sense, posture, motor skill, proprioception and motor activation to allow for proper function of core, proximal hip and LE with self care and ADLs  [x] (93929) Gait Re-education- Provided training and instruction to the patient for proper LE, core and proximal hip recruitment and positioning and eccentric body weight control with ambulation re-education including up and down stairs     Home Exercise Program:    [x] (52963) Reviewed/Progressed HEP activities related to strengthening, flexibility, endurance, ROM of core, proximal hip and LE for functional self-care, mobility, lifting and ambulation/stair navigation   [] (27925)Reviewed/Progressed HEP activities related to improving balance, coordination, kinesthetic sense, posture, motor skill, proprioception of core, proximal hip and LE for self care, mobility, lifting, and ambulation/stair navigation      Manual Treatments:  PROM / STM / Oscillations-Mobs:  G-I, II, III, IV (PA's, Inf., Post.)  [x] (56400) Provided manual therapy to mobilize LE, proximal hip and/or LS spine soft tissue/joints for the purpose of modulating pain, promoting relaxation,  increasing ROM, reducing/eliminating soft tissue swelling/inflammation/restriction, improving soft tissue extensibility and allowing for proper ROM for normal function with self care, mobility, lifting and ambulation. Modalities:   [x] GAME READY (VASO)- for significant edema, swelling, pain control. x10' VASO with R heel prop    Charges:  Timed Code Treatment Minutes: 54'   Total Treatment Minutes: 72'         [] EVAL (LOW) 455 1011   [] EVAL (MOD) 82148  [] EVAL (HIGH) 14233   [] RE-EVAL     [x] BJ(56438) x 2    [] IONTO  [x] NMR (11067)  x  1  [] VASO  [x] Manual (68684) x1      [] Other:  [] TA x      [] Mech Traction (87963)  [] ES(attended) (21278)      [] ES (un) (34981):  NMES and HVPC [] Progression is slowed due to complexities/Impairments listed. [] Progression has been slowed due to co-morbidities. [x] Plan just implemented, too soon to assess goals progression <30days   [] Goals require adjustment due to lack of progress  [] Patient is not progressing as expected and requires additional follow up with physician  [] Other :     Prognosis for POC: [x] Good [] Fair  [] Poor      Patient requires continued skilled intervention: [x] Yes  [] No    Treatment/Activity Tolerance:  [] Patient able to complete treatment  [] Patient limited by fatigue  [x] Patient limited by pain    [] Patient limited by other medical complications  [] Other:     ASSESSMENT:  Pt is now being seen s/p joint manipulation day 13. She is continuing to lack significant motion in her R knee in both KE and KF: KE lacking 15 deg, KF to 87 deg today. She was seen for f/u with her surgeon on Friday who is continuing to recommend aggressive PT- . She is scheduled at 3x/week but PT recommends she continue 4-5x/week due to continued significant ROM limitations that impair normal gait mechanics and put patient at risk for falls or further injury. She would continue to benefit from skilled physical therapy to maximize her functional outcomes and progress towards goals. PLAN: Cont R knee ROM and strength and normalization of gait mechanics. [] Continue per plan of care [x] Alter current plan (see comments above)  [] Plan of care initiated [] Hold pending MD visit [] Discharge      Electronically signed by:  Gisela Huff, PT, DPT, hospitals [id number]      Note: If patient does not return for scheduled/ recommended follow up visits, this note will serve as a discharge from care along with most recent update on progress.

## 2021-02-02 ENCOUNTER — HOSPITAL ENCOUNTER (OUTPATIENT)
Dept: PHYSICAL THERAPY | Age: 71
Setting detail: THERAPIES SERIES
Discharge: HOME | End: 2021-02-02
Payer: MEDICARE

## 2021-02-02 PROCEDURE — 97140 MANUAL THERAPY 1/> REGIONS: CPT

## 2021-02-02 PROCEDURE — 97016 VASOPNEUMATIC DEVICE THERAPY: CPT

## 2021-02-02 PROCEDURE — 97110 THERAPEUTIC EXERCISES: CPT

## 2021-02-02 PROCEDURE — 97112 NEUROMUSCULAR REEDUCATION: CPT

## 2021-02-02 NOTE — FLOWSHEET NOTE
[facility], [address]  Phone: [phone]  Fax [phone]        Physical Therapy Treatment Note/ Progress Report:           Date:  2021    Patient Name:  Dereck Iniguez    :  1950  MRN: [id number]  Restrictions/Precautions:    Medical/Treatment Diagnosis Information:  Diagnosis: s/p r TKR sx date 2020 M25.561; M17.11  Treatment Diagnosis: R knee pain Y37.217  Insurance/Certification information:  PT Insurance Information: /[employer]  Physician Information:  Referring Practitioner: Dr. Tinsley Bi  Has the plan of care been signed (Y/N):        []  Yes  [x]  No     Date of Patient follow up with Physician: 2021      Is this a Progress Report:     []  Yes  [x]  No        If Yes:  Date Range for reporting period:  Beginning 2021  Ending     Progress report will be due (10 Rx or 30 days whichever is less):       Recertification will be due (POC Duration  / 90 days whichever is less): 2021       Visit # Date Range Insurance Allowable Requires auth   15 2020 BMN    [x]no        []yes:           SUBJECTIVE:   Pt notes she is working on her walking and feels improvement with it. Pain: 4/10    OBJECTIVE: See below     Observation:        PT Practice Pattern:H  Co morbidities:1-2  SURgical R TKR 20  INITIAL VISIT 20 CURRENT VISIT 20 CURRENT VISIT 21   PAIN 0-10/10   10   FUNCTIONAL SCALE LEFS () 84%  NT   ROM KE -7 -12 -5 deg    KF 43 55 98 deg (on reformer)                               STRENGHT (MMT) Quad tone  trace NT                                              RESTRICTIONS/PRECAUTIONS: OA; osteopenia     Exercises/Interventions:   HEP instruction was provided and handouts given to include:  Access Code: [email or web address]   Patient Portal: [employer]      Therapeutic Ex (56666) Reps/Sets/sec Notes/CUES Prone LLLD KE w HP on HS  7' On 1/2 foam roll 10#    2x10x3\"    Seated gastroc stretch with strap combo HS stretch with heel prop1'x5    Prone LLLD stretch with ESU and HP       ncline gastroc stretch Manual stabilization to try to straighten knee. 4x15\" MC    5x20\"                      Seated hamstring stretch 5x30\"    EZ stretch chair  Flex  Ext   5'  5'                 Recumbent bike for ROM 6' (F/B)    Pt education x5' Pain, progression, HEP,    Manual Intervention (71973)     Massage stick to L quad, PFJ mobs GrIII-IV, gastroc and HS 6'          prone manual stretch for KE with contract/relax stetch  Sitting KF MWM + end range prolonged stretch 4'    4'         GIII-IV tib/fem mobs for KE with heel prop followed by prolonged manual stretching for KE 10'         NMR re-education (78848)  CUES NEEDED   Gait training with rolling walker x5' Symmetrical step length, KE in stance phase, heel strike   Biphasic 10\"/10\" 2.0 ramp  5'  5'           Good tolerance    Seated LAQ focused on KE, KF  5\"Hx15 VC for preventing hip and trunk compensations   Reformer KF stretching 2 red, 1 blue spring 1'x5       Step up fwd with TKE focus 10x2, 5\"H Manual cueing for hip ext, KE, prevention of forward trunk lean        Therapeutic Activity (30907)                                         Therapeutic Exercise and NMR EXR  [x] (70861) Provided verbal/tactile cueing for activities related to strengthening, flexibility, endurance, ROM for improvements in LE, proximal hip, and core control with self care, mobility, lifting, ambulation. [x] (78836) Provided verbal/tactile cueing for activities related to improving balance, coordination, kinesthetic sense, posture, motor skill, proprioception  to assist with LE, proximal hip, and core control in self care, mobility, lifting, ambulation and eccentric single leg control.      NMR and Therapeutic Activities:    [] (41738 or ) Provided verbal/tactile cueing for activities related to improving balance, coordination, kinesthetic sense, posture, motor skill, proprioception and motor activation to allow for proper function of core, proximal hip and LE with self care and ADLs  [x] (70258) Gait Re-education- Provided training and instruction to the patient for proper LE, core and proximal hip recruitment and positioning and eccentric body weight control with ambulation re-education including up and down stairs     Home Exercise Program:    [x] (99112) Reviewed/Progressed HEP activities related to strengthening, flexibility, endurance, ROM of core, proximal hip and LE for functional self-care, mobility, lifting and ambulation/stair navigation   [] (52508)Reviewed/Progressed HEP activities related to improving balance, coordination, kinesthetic sense, posture, motor skill, proprioception of core, proximal hip and LE for self care, mobility, lifting, and ambulation/stair navigation      Manual Treatments:  PROM / STM / Oscillations-Mobs:  G-I, II, III, IV (PA's, Inf., Post.)  [x] (00121) Provided manual therapy to mobilize LE, proximal hip and/or LS spine soft tissue/joints for the purpose of modulating pain, promoting relaxation,  increasing ROM, reducing/eliminating soft tissue swelling/inflammation/restriction, improving soft tissue extensibility and allowing for proper ROM for normal function with self care, mobility, lifting and ambulation. Modalities:   [x] GAME READY (VASO)- for significant edema, swelling, pain control. x10' VASO with R heel prop    Charges:  Timed Code Treatment Minutes: 54'   Total Treatment Minutes: 76' (10' ind ex)         [] EVAL (LOW) 455 1011   [] EVAL (MOD) 64041  [] EVAL (HIGH) [id number]   [] RE-EVAL     [x] WQ(47169) x 1    [] IONTO  [x] NMR (98071)  x  1  [x] VASO  [x] Manual (63242) x2      [] Other:  [] TA x      [] Mech Traction (11344)  [] ES(attended) (88628)      [] ES (un) (24308):  NMES and HVPC        GOALS:  Patient stated goal: be able to squat; bend knee and no pain   [x] Progressing: [] Met: [] Not Met: [] Adjusted    Therapist goals for Patient:   Short Term Goals: To be achieved in: 2 weeks  1. Independent in HEP and progression per patient tolerance, in order to prevent re-injury. [x] Progressing: [] Met: [] Not Met: [] Adjusted  2. Patient will have a decrease in pain to facilitate improvement in movement, function, and ADLs as indicated by Functional Deficits. [x] Progressing: [] Met: [] Not Met: [] Adjusted    Long Term Goals: To be achieved in: 8-10 weeks  1. Disability index score of 40% or less for the LEFS to assist with reaching prior level of function. [] Progressing: [] Met: [] Not Met: [] Adjusted  2. Patient will demonstrate increased AROM to R knee ext to -2 and flex to 110  to allow for proper joint functioning as indicated by patients Functional Deficits. [x] Progressing: [] Met: [] Not Met: [] Adjusted  3. Patient will demonstrate an increase in Strength to good proximal hip strength and control, within 5lb HHD in LE to allow for proper functional mobility as indicated by patients Functional Deficits. [] Progressing: [] Met: [] Not Met: [] Adjusted  4. Patient will return to being able to ambulate with proper gait without an AD; ascend and descend stairs to basement with reciprocal gait  without increased symptoms or restriction. [] Progressing: [] Met: [] Not Met: [] Adjusted    Progression Towards Functional goals:  [] Patient is progressing as expected towards functional goals listed. [x] Progression is slowed due to complexities listed. [] Progression has been slowed due to co-morbidities. [] Plan just implemented, too soon to assess goals progression  [] Other:         Overall Progression Towards Functional goals/ Treatment Progress Update:  [] Patient is progressing as expected towards functional goals listed. [] Progression is slowed due to complexities/Impairments listed. [] Progression has been slowed due to co-morbidities. [x] Plan just implemented, too soon to assess goals progression <30days   [] Goals require adjustment due to lack of progress  [] Patient is not progressing as expected and requires additional follow up with physician  [] Other :     Prognosis for POC: [x] Good [] Fair  [] Poor      Patient requires continued skilled intervention: [x] Yes  [] No    Treatment/Activity Tolerance:  [] Patient able to complete treatment  [] Patient limited by fatigue  [x] Patient limited by pain    [] Patient limited by other medical complications  [] Other:     ASSESSMENT:  Pt is now being seen s/p joint manipulation day 14. She is continuing to lack significant motion in her R knee in both KE and KF however with significant PT OP she was able to achieve -5 deg KE and 98 deg of KF today which is more improvement than we have seen the last few visits. She was seen for f/u with her surgeon on Friday who is continuing to recommend aggressive PT- . She is scheduled at 4-5x/week due to continued significant ROM limitations that impair normal gait mechanics and put patient at risk for falls or further injury. She would continue to benefit from skilled physical therapy to maximize her functional outcomes and progress towards goals. PLAN: Cont R knee ROM and strength and normalization of gait mechanics. [] Continue per plan of care [x] Alter current plan (see comments above)  [] Plan of care initiated [] Hold pending MD visit [] Discharge      Electronically signed by:  Keith Lord, PT, DPT, [facility] [id number]      Note: If patient does not return for scheduled/ recommended follow up visits, this note will serve as a discharge from care along with most recent update on progress.

## 2021-02-03 ENCOUNTER — HOSPITAL ENCOUNTER (OUTPATIENT)
Dept: PHYSICAL THERAPY | Age: 71
Setting detail: THERAPIES SERIES
Discharge: HOME | End: 2021-02-03
Payer: MEDICARE

## 2021-02-03 PROCEDURE — 97110 THERAPEUTIC EXERCISES: CPT

## 2021-02-03 PROCEDURE — 97140 MANUAL THERAPY 1/> REGIONS: CPT

## 2021-02-03 NOTE — FLOWSHEET NOTE
[facility], [address]  Phone: [phone]  Fax [phone]        Physical Therapy Treatment Note/ Progress Report:           Date:  2/3/2021    Patient Name:  Ladonna Borja    :  1950  MRN: [id number]  Restrictions/Precautions:    Medical/Treatment Diagnosis Information:  Diagnosis: s/p r TKR sx date 2020 M25.561; M17.11  Treatment Diagnosis: R knee pain P43.042  Insurance/Certification information:  PT Insurance Information: /[employer]  Physician Information:  Referring Practitioner: Dr. Supriya Gallagher  Has the plan of care been signed (Y/N):        []  Yes  [x]  No     Date of Patient follow up with Physician: 2021      Is this a Progress Report:     []  Yes  [x]  No        If Yes:  Date Range for reporting period:  Beginning 2021  Ending     Progress report will be due (10 Rx or 30 days whichever is less): 3011      Recertification will be due (POC Duration  / 90 days whichever is less): 2021       Visit # Date Range Insurance Allowable Requires auth   16 2020 BMN    [x]no        []yes:           SUBJECTIVE:   Pt reports that she does feel like she has improved a lot since her manipulation and that she is hoping that she has much better outcomes this time. Pain:  2/10    OBJECTIVE: See below    ? ? PT Practice Pattern:H  Co morbidities:1-2  SURgical R TKR 20  INITIAL VISIT 20 CURRENT VISIT 2/3/21   PAIN 0-10/10   210   FUNCTIONAL SCALE LEFS () 84%  NT   ROM KE -7 -12 -15    KF 43 55 95 @ EOB                               STRENGHT (MMT) Quad tone  trace NT                                              RESTRICTIONS/PRECAUTIONS: OA; osteopenia     Exercises/Interventions:   HEP instruction was provided and handouts given to include:  Access Code: [email or web address]   Patient Portal: [employer]/ Therapeutic Ex (14512) Reps/Sets/sec Notes/CUES               Rocking into flex/ext                   7' On 1/2 foam roll 10#    2x10x3\"    1'x5          ncline gastroc stretch Manual stabilization to try to straighten knee. 4x15\" MC    5x20\"                       5x30\"    EZ stretch chair  Flex  Ext   5'  5'   86-90  28-        Flexion/ext on swiss ball w/OP 10\"x10       Recumbent bike for ROM 6' (F/B)    Pt education x5' Pain, progression, HEP,    Manual Intervention (49101)     STM to L quad, PFJ mobs GrIII-IV, gastroc and HS 6'          Sitting KF MWM + end range prolonged stretch 4'    4'         GIII-IV tib/fem mobs for KE with heel prop followed by prolonged manual stretching for KE  Prolong heel prop KE stretch w/ moist heat  10'      5'         NMR re-education (91533)  CUES NEEDED    x5' Symmetrical step length, KE in stance phase, heel strike   Biphasic 10\"/10\" 2.0 ramp  5'  5'           Good tolerance    Seated LAQ focused on KE, KF w/ manual OP at both end ranges 2x10x5\" VC for preventing hip and trunk compensations   1'x5       10x2, 5\"H Manual cueing for hip ext, KE, prevention of forward trunk lean        Therapeutic Activity (44461)                                         Therapeutic Exercise and NMR EXR  [x] (10450) Provided verbal/tactile cueing for activities related to strengthening, flexibility, endurance, ROM for improvements in LE, proximal hip, and core control with self care, mobility, lifting, ambulation. [x] (89913) Provided verbal/tactile cueing for activities related to improving balance, coordination, kinesthetic sense, posture, motor skill, proprioception  to assist with LE, proximal hip, and core control in self care, mobility, lifting, ambulation and eccentric single leg control.      NMR and Therapeutic Activities: [] Progression is slowed due to complexities/Impairments listed. [] Progression has been slowed due to co-morbidities. [x] Plan just implemented, too soon to assess goals progression <30days   [] Goals require adjustment due to lack of progress  [] Patient is not progressing as expected and requires additional follow up with physician  [] Other :     Prognosis for POC: [x] Good [] Fair  [] Poor      Patient requires continued skilled intervention: [x] Yes  [] No    Treatment/Activity Tolerance:  [] Patient able to complete treatment  [] Patient limited by fatigue  [x] Patient limited by pain    [] Patient limited by other medical complications  [] Other:     ASSESSMENT:  Pt continued to demonstrate significantly decreased extension range of motion, tolerating OP to lacking 15 degrees full extension. She does demonstrate better quality of movement in her mid range however and does tolerate end range with less pain overall. She would continue to benefit from skilled physical therapy to maximize her functional outcomes and progress towards goals. PLAN: Cont R knee ROM and strength and normalization of gait mechanics. [] Continue per plan of care [x] Alter current plan (see comments above)  [] Plan of care initiated [] Hold pending MD visit [] Discharge      Electronically signed by:  Lyric Goldman PT      Note: If patient does not return for scheduled/ recommended follow up visits, this note will serve as a discharge from care along with most recent update on progress.

## 2021-02-04 ENCOUNTER — HOSPITAL ENCOUNTER (OUTPATIENT)
Dept: PHYSICAL THERAPY | Age: 71
Setting detail: THERAPIES SERIES
Discharge: HOME | End: 2021-02-04
Payer: MEDICARE

## 2021-02-04 PROCEDURE — 97110 THERAPEUTIC EXERCISES: CPT | Performed by: PHYSICAL THERAPIST

## 2021-02-04 PROCEDURE — 97140 MANUAL THERAPY 1/> REGIONS: CPT | Performed by: PHYSICAL THERAPIST

## 2021-02-04 PROCEDURE — 97112 NEUROMUSCULAR REEDUCATION: CPT | Performed by: PHYSICAL THERAPIST

## 2021-02-04 PROCEDURE — 97016 VASOPNEUMATIC DEVICE THERAPY: CPT | Performed by: PHYSICAL THERAPIST

## 2021-02-04 NOTE — FLOWSHEET NOTE
[facility], [address]  Phone: [phone]  Fax [phone]        Physical Therapy Treatment Note/ Progress Report:           Date:  2021    Patient Name:  Torres Thomas    :  1950  MRN: [id number]  Restrictions/Precautions:    Medical/Treatment Diagnosis Information:  Diagnosis: s/p r TKR sx date 2020 M25.561; M17.11  Treatment Diagnosis: R knee pain R02.865  Insurance/Certification information:  PT Insurance Information: /[employer]  Physician Information:  Referring Practitioner: Dr. Refugio Smart  Has the plan of care been signed (Y/N):        []  Yes  [x]  No     Date of Patient follow up with Physician: 2/15/21    Is this a Progress Report:     []  Yes  [x]  No        If Yes:  Date Range for reporting period:  Beginning 2021  Ending     Progress report will be due (10 Rx or 30 days whichever is less): 7557      Recertification will be due (POC Duration  / 90 days whichever is less): 2021       Visit # Date Range Insurance Allowable Requires auth   17 2020 BMN    [x]no        []yes:           SUBJECTIVE:     Feels stiff due to weather    Pain:  2/10    OBJECTIVE: See below    ? Observation:   ? -15 measured on EZ stretch and seated    PT Practice Pattern:H  Co morbidities:1-2  SURgical R TKR 20  INITIAL VISIT 20 CURRENT VISIT 2/3/21   PAIN 0-10/10   210   FUNCTIONAL SCALE LEFS () 84%  NT   ROM KE -7 -12 -15    KF 43 55 95 @ EOB                               STRENGHT (MMT) Quad tone  trace NT                                              RESTRICTIONS/PRECAUTIONS: OA; osteopenia     Exercises/Interventions:   HEP instruction was provided and handouts given to include:  Access Code: [email or web address]   Patient Portal: [email or web address]      Therapeutic Ex (86909) Reps/Sets/sec Notes/CUES               Rocking into flex/ext 7' On 1/2 foam roll 10#    2x10x3\"    1'x5          ncline gastroc stretch Manual stabilization to try to straighten knee. 4x15\" MC    5x20\"                       5x30\"    EZ stretch chair  Ext     5'   86-90  23-   Stair HS stretch 10x10\"             Pt education x5' Pain, progression, HEP,    Manual Intervention ([id number])     STM to L quad, PFJ mobs GrIII-IV, gastroc and HS 6'          prone manual stretch for KE with contract/relax stetch  Sitting KF MWM + end range prolonged stretch 4'    4'         GIII-IV tib/fem mobs for KE with heel prop followed by prolonged manual stretching for KE    10'          Step lunge on bottom step with RLE planted and pull into ext by PT  10x5\"                   NMR re-education (73428)  CUES NEEDED    x5' Symmetrical step length, KE in stance phase, heel strike   Biphasic 10\"/10\" 2.0 ramp  5'  5'           Good tolerance    1'x5       10x2, 5\"H Manual cueing for hip ext, KE, prevention of forward trunk lean   Biphasic NMES QS with contract relax HS supine for KE 12'  10\"/10\"  2.0    Standing HS stretch with EXT pull by PT on stairs 15x5\"                   Therapeutic Activity (77918)                                         Therapeutic Exercise and NMR EXR  [x] (37951) Provided verbal/tactile cueing for activities related to strengthening, flexibility, endurance, ROM for improvements in LE, proximal hip, and core control with self care, mobility, lifting, ambulation. [x] (44581) Provided verbal/tactile cueing for activities related to improving balance, coordination, kinesthetic sense, posture, motor skill, proprioception  to assist with LE, proximal hip, and core control in self care, mobility, lifting, ambulation and eccentric single leg control.      NMR and Therapeutic Activities: [] (26094 or 49878) Provided verbal/tactile cueing for activities related to improving balance, coordination, kinesthetic sense, posture, motor skill, proprioception and motor activation to allow for proper function of core, proximal hip and LE with self care and ADLs  [x] (48260) Gait Re-education- Provided training and instruction to the patient for proper LE, core and proximal hip recruitment and positioning and eccentric body weight control with ambulation re-education including up and down stairs     Home Exercise Program:    [x] (63563) Reviewed/Progressed HEP activities related to strengthening, flexibility, endurance, ROM of core, proximal hip and LE for functional self-care, mobility, lifting and ambulation/stair navigation   [] (31715)Reviewed/Progressed HEP activities related to improving balance, coordination, kinesthetic sense, posture, motor skill, proprioception of core, proximal hip and LE for self care, mobility, lifting, and ambulation/stair navigation      Manual Treatments:  PROM / STM / Oscillations-Mobs:  G-I, II, III, IV (PA's, Inf., Post.)  [x] (78378) Provided manual therapy to mobilize LE, proximal hip and/or LS spine soft tissue/joints for the purpose of modulating pain, promoting relaxation,  increasing ROM, reducing/eliminating soft tissue swelling/inflammation/restriction, improving soft tissue extensibility and allowing for proper ROM for normal function with self care, mobility, lifting and ambulation. Modalities: NMES Biphasic with contract relax   [x] GAME READY (VASO)- for significant edema, swelling, pain control.  x10' VASO with R heel prop    Charges:  Timed Code Treatment Minutes: 55   Total Treatment Minutes: 65         [] EVAL (LOW) 88299   [] EVAL (MOD) 84348  [] EVAL (HIGH) 95372   [] RE-EVAL     [x] XN(80828) x 1    [] IONTO  [x] NMR (42222)  x 2   [x] VASO  [x] Manual (05545) x1      [] Other:  [] TA x      [] Mech Traction (73572) [] ES(attended) (57122)      [] ES (un) (87274): NMES and HVPC        GOALS:  Patient stated goal: be able to squat; bend knee and no pain   [x] Progressing: [] Met: [] Not Met: [] Adjusted    Therapist goals for Patient:   Short Term Goals: To be achieved in: 2 weeks  1. Independent in HEP and progression per patient tolerance, in order to prevent re-injury. [x] Progressing: [] Met: [] Not Met: [] Adjusted  2. Patient will have a decrease in pain to facilitate improvement in movement, function, and ADLs as indicated by Functional Deficits. [x] Progressing: [] Met: [] Not Met: [] Adjusted    Long Term Goals: To be achieved in: 8-10 weeks  1. Disability index score of 40% or less for the LEFS to assist with reaching prior level of function. [] Progressing: [] Met: [] Not Met: [] Adjusted  2. Patient will demonstrate increased AROM to R knee ext to -2 and flex to 110  to allow for proper joint functioning as indicated by patients Functional Deficits. [x] Progressing: [] Met: [] Not Met: [] Adjusted  3. Patient will demonstrate an increase in Strength to good proximal hip strength and control, within 5lb HHD in LE to allow for proper functional mobility as indicated by patients Functional Deficits. [] Progressing: [] Met: [] Not Met: [] Adjusted  4. Patient will return to being able to ambulate with proper gait without an AD; ascend and descend stairs to basement with reciprocal gait  without increased symptoms or restriction. [] Progressing: [] Met: [] Not Met: [] Adjusted    Progression Towards Functional goals:  [] Patient is progressing as expected towards functional goals listed. [x] Progression is slowed due to complexities listed. [] Progression has been slowed due to co-morbidities.   [] Plan just implemented, too soon to assess goals progression  [] Other:         Overall Progression Towards Functional goals/ Treatment Progress Update:

## 2021-02-05 ENCOUNTER — HOSPITAL ENCOUNTER (OUTPATIENT)
Dept: PHYSICAL THERAPY | Age: 71
Setting detail: THERAPIES SERIES
Discharge: HOME | End: 2021-02-05
Payer: MEDICARE

## 2021-02-05 PROCEDURE — 97016 VASOPNEUMATIC DEVICE THERAPY: CPT | Performed by: PHYSICAL THERAPIST

## 2021-02-05 PROCEDURE — 97140 MANUAL THERAPY 1/> REGIONS: CPT | Performed by: PHYSICAL THERAPIST

## 2021-02-05 PROCEDURE — 97112 NEUROMUSCULAR REEDUCATION: CPT | Performed by: PHYSICAL THERAPIST

## 2021-02-05 PROCEDURE — 97110 THERAPEUTIC EXERCISES: CPT | Performed by: PHYSICAL THERAPIST

## 2021-02-05 NOTE — FLOWSHEET NOTE
[facility],  [address]  Phone: [phone]  Fax [phone]        Physical Therapy Treatment Note/ Progress Report:           Date:  2021    Patient Name:  Jia Kirkpatrick    :  1950  MRN: [id number]  Restrictions/Precautions:    Medical/Treatment Diagnosis Information:  Diagnosis: s/p r TKR sx date 2020 M25.561; M17.11  Treatment Diagnosis: R knee pain J69.693  Insurance/Certification information:  PT Insurance Information: /[employer]  Physician Information:  Referring Practitioner: Dr. Mukesh Pimentel  Has the plan of care been signed (Y/N):        []  Yes  [x]  No     Date of Patient follow up with Physician: 2/15/21    Is this a Progress Report:     []  Yes  [x]  No        If Yes:  Date Range for reporting period:  Beginning 2021  Ending     Progress report will be due (10 Rx or 30 days whichever is less): 3/10/7725      Recertification will be due (POC Duration  / 90 days whichever is less): 2021       Visit # Date Range Insurance Allowable Requires auth   17 2020 BMN    [x]no        []yes:           SUBJECTIVE:     Feels stiff due to weather. Feels like it may be a little better, and wasn't too sore after yesterday. Pain:  2/10    OBJECTIVE: See below    ? Observation: significantly increased patellar mobility all directions, decreased tightness quad and HS.    ? 97 measured FLX EOB with PT OP    PT Practice Pattern:H  Co morbidities:1-2  SURgical R TKR 20  INITIAL VISIT 20 CURRENT VISIT 21   PAIN 0-10/10   2/10   FUNCTIONAL SCALE LEFS () 84%  NT   ROM KE -7 -12 -20 a PT/-10 post    KF 37 55 70 a PT/97 post                               STRENGHT (MMT) Quad tone  trace NT                                              RESTRICTIONS/PRECAUTIONS: OA; osteopenia     Exercises/Interventions:   HEP instruction was provided and handouts given to include: Access Code: [email or web address]   Patient Portal: [email or web address]      Therapeutic Ex (45939) Reps/Sets/sec Notes/CUES               Rocking into flex/ext                   7' On 1/2 foam roll 10#    2x10x3\"    1'x5          ncline gastroc stretch Manual stabilization to try to straighten knee. 4x15\" MC    5x20\"                   Trustretch KF stretch standing on LLE 10\"x10      5x30\"    EZ stretch chair  Flex   5'     82     Stair HS stretch 10x10\"             Pt education x5' Pain, progression, HEP,    Manual Intervention (00136)     STM to L quad, PFJ mobs GrIII-IV, gastroc and HS 6'            Sitting KF MWM + end range prolonged stretch  Seated PROM KF EOB     4'    3'         GIII-IV tib/fem mobs for KE/KF with heel prop with prolonged manual stretching for KE    10'                            NMR re-education (43352)  CUES NEEDED    x5' Symmetrical step length, KE in stance phase, heel strike   Biphasic 10\"/10\" 2.0 ramp  5'  5'           Good tolerance    1'x5       10x2, 5\"H Manual cueing for hip ext, KE, prevention of forward trunk lean   Biphasic NMES QS with contract relax HS supine for KE 12'  10\"/10\"  2.0                      Therapeutic Activity (20803)                                         Therapeutic Exercise and NMR EXR  [x] (54813) Provided verbal/tactile cueing for activities related to strengthening, flexibility, endurance, ROM for improvements in LE, proximal hip, and core control with self care, mobility, lifting, ambulation. [x] (03363) Provided verbal/tactile cueing for activities related to improving balance, coordination, kinesthetic sense, posture, motor skill, proprioception  to assist with LE, proximal hip, and core control in self care, mobility, lifting, ambulation and eccentric single leg control.      NMR and Therapeutic Activities: [x] (80120 or 18645) Provided verbal/tactile cueing for activities related to improving balance, coordination, kinesthetic sense, posture, motor skill, proprioception and motor activation to allow for proper function of core, proximal hip and LE with self care and ADLs  [] (92680) Gait Re-education- Provided training and instruction to the patient for proper LE, core and proximal hip recruitment and positioning and eccentric body weight control with ambulation re-education including up and down stairs     Home Exercise Program:    [x] (46536) Reviewed/Progressed HEP activities related to strengthening, flexibility, endurance, ROM of core, proximal hip and LE for functional self-care, mobility, lifting and ambulation/stair navigation   [] (33421)Reviewed/Progressed HEP activities related to improving balance, coordination, kinesthetic sense, posture, motor skill, proprioception of core, proximal hip and LE for self care, mobility, lifting, and ambulation/stair navigation      Manual Treatments:  PROM / STM / Oscillations-Mobs:  G-I, II, III, IV (PA's, Inf., Post.)  [x] (97440) Provided manual therapy to mobilize LE, proximal hip and/or LS spine soft tissue/joints for the purpose of modulating pain, promoting relaxation,  increasing ROM, reducing/eliminating soft tissue swelling/inflammation/restriction, improving soft tissue extensibility and allowing for proper ROM for normal function with self care, mobility, lifting and ambulation. Modalities: NMES Biphasic with contract relax   [x] GAME READY (VASO)- for significant edema, swelling, pain control.  x10' VASO with R heel prop    Charges:  Timed Code Treatment Minutes: 55   Total Treatment Minutes: 65         [] EVAL (LOW) 76452   [] EVAL (MOD) 80830  [] EVAL (HIGH) 96418   [] RE-EVAL     [x] OT(01257) x 1    [] IONTO  [x] NMR (02160)  x 2   [x] VASO  [x] Manual (30535) x1      [] Other:  [] TA x      [] Mech Traction (35477) [] ES(attended) (74844)      [] ES (un) (99246): NMES and HVPC        GOALS:  Patient stated goal: be able to squat; bend knee and no pain   [x] Progressing: [] Met: [] Not Met: [] Adjusted    Therapist goals for Patient:   Short Term Goals: To be achieved in: 2 weeks  1. Independent in HEP and progression per patient tolerance, in order to prevent re-injury. [x] Progressing: [] Met: [] Not Met: [] Adjusted  2. Patient will have a decrease in pain to facilitate improvement in movement, function, and ADLs as indicated by Functional Deficits. [x] Progressing: [] Met: [] Not Met: [] Adjusted    Long Term Goals: To be achieved in: 8-10 weeks  1. Disability index score of 40% or less for the LEFS to assist with reaching prior level of function. [] Progressing: [] Met: [] Not Met: [] Adjusted  2. Patient will demonstrate increased AROM to R knee ext to -2 and flex to 110  to allow for proper joint functioning as indicated by patients Functional Deficits. [x] Progressing: [] Met: [] Not Met: [] Adjusted  3. Patient will demonstrate an increase in Strength to good proximal hip strength and control, within 5lb HHD in LE to allow for proper functional mobility as indicated by patients Functional Deficits. [] Progressing: [] Met: [] Not Met: [] Adjusted  4. Patient will return to being able to ambulate with proper gait without an AD; ascend and descend stairs to basement with reciprocal gait  without increased symptoms or restriction. [] Progressing: [] Met: [] Not Met: [] Adjusted    Progression Towards Functional goals:  [] Patient is progressing as expected towards functional goals listed. [x] Progression is slowed due to complexities listed. [] Progression has been slowed due to co-morbidities.   [] Plan just implemented, too soon to assess goals progression  [] Other:         Overall Progression Towards Functional goals/ Treatment Progress Update:

## 2021-02-08 ENCOUNTER — HOSPITAL ENCOUNTER (OUTPATIENT)
Dept: PHYSICAL THERAPY | Age: 71
Setting detail: THERAPIES SERIES
Discharge: HOME | End: 2021-02-08
Payer: MEDICARE

## 2021-02-08 PROCEDURE — 97140 MANUAL THERAPY 1/> REGIONS: CPT | Performed by: PHYSICAL THERAPIST

## 2021-02-08 PROCEDURE — 97112 NEUROMUSCULAR REEDUCATION: CPT | Performed by: PHYSICAL THERAPIST

## 2021-02-08 PROCEDURE — 97016 VASOPNEUMATIC DEVICE THERAPY: CPT | Performed by: PHYSICAL THERAPIST

## 2021-02-08 PROCEDURE — 97110 THERAPEUTIC EXERCISES: CPT | Performed by: PHYSICAL THERAPIST

## 2021-02-08 NOTE — FLOWSHEET NOTE
[facility], [address]  Phone: [phone]  Fax [phone]        Physical Therapy Treatment Note/ Progress Report:           Date:  2021    Patient Name:  Afia Morgan    :  1950  MRN: [id number]  Restrictions/Precautions:    Medical/Treatment Diagnosis Information:  Diagnosis: s/p r TKR sx date 2020 M25.561; M17.11  Treatment Diagnosis: R knee pain I25.407  Insurance/Certification information:  PT Insurance Information: /[employer]  Physician Information:  Referring Practitioner: Dr. Livia Westfall  Has the plan of care been signed (Y/N):        []  Yes  [x]  No     Date of Patient follow up with Physician: 2/15/21    Is this a Progress Report:     []  Yes  [x]  No        If Yes:  Date Range for reporting period:  Beginning 2021  Ending     Progress report will be due (10 Rx or 30 days whichever is less):       Recertification will be due (POC Duration  / 90 days whichever is less): 2021       Visit # Date Range Insurance Allowable Requires auth   17 2020 BMN    [x]no        []yes:           SUBJECTIVE:     Feels stiff due weekend. Pain:  10    OBJECTIVE: See below    ? Observation: significantly increased patellar mobility all directions, decreased tightness quad and HS. Medial HS cont to be very tight.    ? PROM KE with PT OP 10 degrees 21    PT Practice Pattern:H  Co morbidities:1-2  SURgical R TKR 20  INITIAL VISIT 20 CURRENT VISIT 21   PAIN 0-10/10   210   FUNCTIONAL SCALE LEFS () 84%  NT   ROM KE -7 -12 -20 a PT/-10 post    KF 37 55 70 a PT/97 post                               STRENGHT (MMT) Quad tone  trace NT                                              RESTRICTIONS/PRECAUTIONS: OA; osteopenia     Exercises/Interventions:   HEP instruction was provided and handouts given to include:  Access Code: [email or web address] [x] (33148 or 47280) Provided verbal/tactile cueing for activities related to improving balance, coordination, kinesthetic sense, posture, motor skill, proprioception and motor activation to allow for proper function of core, proximal hip and LE with self care and ADLs  [] (41936) Gait Re-education- Provided training and instruction to the patient for proper LE, core and proximal hip recruitment and positioning and eccentric body weight control with ambulation re-education including up and down stairs     Home Exercise Program:    [x] (09066) Reviewed/Progressed HEP activities related to strengthening, flexibility, endurance, ROM of core, proximal hip and LE for functional self-care, mobility, lifting and ambulation/stair navigation   [] (01543)Reviewed/Progressed HEP activities related to improving balance, coordination, kinesthetic sense, posture, motor skill, proprioception of core, proximal hip and LE for self care, mobility, lifting, and ambulation/stair navigation      Manual Treatments:  PROM / STM / Oscillations-Mobs:  G-I, II, III, IV (PA's, Inf., Post.)  [x] (14970) Provided manual therapy to mobilize LE, proximal hip and/or LS spine soft tissue/joints for the purpose of modulating pain, promoting relaxation,  increasing ROM, reducing/eliminating soft tissue swelling/inflammation/restriction, improving soft tissue extensibility and allowing for proper ROM for normal function with self care, mobility, lifting and ambulation. Modalities: NMES Biphasic with contract relax   [x] GAME READY (VASO)- for significant edema, swelling, pain control.  x10' VASO with R heel prop    Charges:  Timed Code Treatment Minutes: 55   Total Treatment Minutes: 65         [] EVAL (LOW) 10773   [] EVAL (MOD) 51323  [] EVAL (HIGH) 11013   [] RE-EVAL     [x] HO(32058) x 1    [] IONTO  [x] NMR (55923)  x 1   [x] VASO  [x] Manual (42735) x1      [] Other:  [] TA x      [] Mech Traction (23666) [] ES(attended) (44912)      [] ES (un) (63926): NMES and HVPC        GOALS:  Patient stated goal: be able to squat; bend knee and no pain   [x] Progressing: [] Met: [] Not Met: [] Adjusted    Therapist goals for Patient:   Short Term Goals: To be achieved in: 2 weeks  1. Independent in HEP and progression per patient tolerance, in order to prevent re-injury. [x] Progressing: [] Met: [] Not Met: [] Adjusted  2. Patient will have a decrease in pain to facilitate improvement in movement, function, and ADLs as indicated by Functional Deficits. [x] Progressing: [] Met: [] Not Met: [] Adjusted    Long Term Goals: To be achieved in: 8-10 weeks  1. Disability index score of 40% or less for the LEFS to assist with reaching prior level of function. [] Progressing: [] Met: [] Not Met: [] Adjusted  2. Patient will demonstrate increased AROM to R knee ext to -2 and flex to 110  to allow for proper joint functioning as indicated by patients Functional Deficits. [x] Progressing: [] Met: [] Not Met: [] Adjusted  3. Patient will demonstrate an increase in Strength to good proximal hip strength and control, within 5lb HHD in LE to allow for proper functional mobility as indicated by patients Functional Deficits. [] Progressing: [] Met: [] Not Met: [] Adjusted  4. Patient will return to being able to ambulate with proper gait without an AD; ascend and descend stairs to basement with reciprocal gait  without increased symptoms or restriction. [] Progressing: [] Met: [] Not Met: [] Adjusted    Progression Towards Functional goals:  [] Patient is progressing as expected towards functional goals listed. [x] Progression is slowed due to complexities listed. [] Progression has been slowed due to co-morbidities.   [] Plan just implemented, too soon to assess goals progression  [] Other:         Overall Progression Towards Functional goals/ Treatment Progress Update: [] Patient is progressing as expected towards functional goals listed. [] Progression is slowed due to complexities/Impairments listed. [] Progression has been slowed due to co-morbidities. [x] Plan just implemented, too soon to assess goals progression <30days   [] Goals require adjustment due to lack of progress  [] Patient is not progressing as expected and requires additional follow up with physician  [] Other :     Prognosis for POC: [x] Good [] Fair  [] Poor      Patient requires continued skilled intervention: [x] Yes  [] No    Treatment/Activity Tolerance:  [] Patient able to complete treatment  [] Patient limited by fatigue  [x] Patient limited by pain    [] Patient limited by other medical complications  [] Other:     ASSESSMENT:  Pt continued to demonstrate significantly decreased extension/flexion range of motion, however she did show improvement over the last 2 visit. She does demonstrate better quality of movement in her mid range however and does tolerate end range with less pain overall. She would continue to benefit from skilled physical therapy to maximize her functional outcomes and progress towards goals. PLAN: Cont R knee ROM and strength and normalization of gait mechanics. Cont to see daily for 1 more week at least then 3x weekly if pt tolerates well.  [] Continue per plan of care [x] Alter current plan (see comments above)  [] Plan of care initiated [] Hold pending MD visit [] Discharge      Electronically signed by:  Nadia Beck, PT  [id number]    Note: If patient does not return for scheduled/ recommended follow up visits, this note will serve as a discharge from care along with most recent update on progress.

## 2021-02-09 ENCOUNTER — HOSPITAL ENCOUNTER (OUTPATIENT)
Dept: PHYSICAL THERAPY | Age: 71
Setting detail: THERAPIES SERIES
Discharge: HOME | End: 2021-02-09
Payer: MEDICARE

## 2021-02-09 PROCEDURE — 97140 MANUAL THERAPY 1/> REGIONS: CPT

## 2021-02-09 PROCEDURE — 97112 NEUROMUSCULAR REEDUCATION: CPT

## 2021-02-09 PROCEDURE — 97110 THERAPEUTIC EXERCISES: CPT

## 2021-02-09 PROCEDURE — G0283 ELEC STIM OTHER THAN WOUND: HCPCS

## 2021-02-09 PROCEDURE — 97016 VASOPNEUMATIC DEVICE THERAPY: CPT

## 2021-02-09 NOTE — FLOWSHEET NOTE
[facility], [address] [place]  Phone: [phone]  Fax [phone]        Physical Therapy Treatment Note/ Progress Report:           Date:  2021    Patient Name:  Julius Medina    :  1950  MRN: [id number]  Restrictions/Precautions:    Medical/Treatment Diagnosis Information:  Diagnosis: s/p r TKR sx date 2020 M25.561; M17.11  Treatment Diagnosis: R knee pain D65.693  Insurance/Certification information:  PT Insurance Information: /[employer]  Physician Information:  Referring Practitioner: Dr. Robles Prim  Has the plan of care been signed (Y/N):        []  Yes  [x]  No     Date of Patient follow up with Physician: 2/15/21    Is this a Progress Report:     []  Yes  [x]  No        If Yes:  Date Range for reporting period:  Beginning 2021  Ending     Progress report will be due (10 Rx or 30 days whichever is less):       Recertification will be due (POC Duration  / 90 days whichever is less): 2021       Visit # Date Range Insurance Allowable Requires auth   18 2020 BMN    [x]no        []yes:           SUBJECTIVE:    Patient reports the usual stiffness and soreness last night along the inside of the knee. Pain:  2/10    OBJECTIVE: See below    ? Observation: significantly increased patellar mobility all directions, decreased tightness quad and HS. Medial HS cont to be very tight.    ? PROM KE with PT OP 10 degrees 21    PT Practice Pattern:H  Co morbidities:1-2  SURgical R TKR 20  INITIAL VISIT 20 CURRENT VISIT 21   PAIN 0-10/10   2/10   FUNCTIONAL SCALE LEFS () 84%  NT   ROM KE -7 - -20 a PT/-10 post    KF 43 55 70 a PT/97 post                               STRENGHT (MMT) Quad tone  trace NT                                              RESTRICTIONS/PRECAUTIONS: OA; osteopenia     Exercises/Interventions: HEP instruction was provided and handouts given to include:  Access Code: [email or web address]   Patient Portal: [email or web address]      Therapeutic Ex (33646) Reps/Sets/sec Notes/CUES               Rocking into flex/ext                   7' On 1/2 foam roll 10#    2x10x3\"    Seated gastroc stretch with strap combo HS stretch with heel prop 6x30\"          ncline gastroc stretch 5x15\"   4x15\" MC    5x20\"                   Trustretch KE stretch standing on RLE 10\"x10      5x30\"    EZ stretch chair  FlexExt   5'  5'     23   Stair HS stretch 10x10\"          Recumbent bike for ROM 6' (F/B)    Pt education x5' Pain, progression, HEP,    Manual Intervention (74765)     STM to L quad, PFJ mobs GrIII-IV, gastroc and HS, ITB 10'    IASTM R quads, adductors, HS, gastroc 5'             GIII-IV tib/fem mobs for KE/KF with heel prop with prolonged manual stretching for KE    8'                            NMR re-education (52691)  CUES NEEDED   Gait training with rolling walker  Stair retraining B UE support x5'  x10 reps Symmetrical step length, KE in stance phase, heel strike   Biphasic 10\"/10\" 2.0 ramp  5'  5'           Good tolerance    1'x5       10x2, 5\"H Manual cueing for hip ext, KE, prevention of forward trunk lean   Biphasic NMES QS with contract relax HS supine for KE 10'  10\"/10\"  2.0  5' with towel roll  5' without roll                      Therapeutic Activity (60662)                                         Therapeutic Exercise and NMR EXR  [x] (75262) Provided verbal/tactile cueing for activities related to strengthening, flexibility, endurance, ROM for improvements in LE, proximal hip, and core control with self care, mobility, lifting, ambulation. [x] (02106) Provided verbal/tactile cueing for activities related to improving balance, coordination, kinesthetic sense, posture, motor skill, proprioception  to assist with LE, proximal hip, and core control in self care, mobility, lifting, ambulation and eccentric single leg control. NMR and Therapeutic Activities:    [x] (99009 or 33370) Provided verbal/tactile cueing for activities related to improving balance, coordination, kinesthetic sense, posture, motor skill, proprioception and motor activation to allow for proper function of core, proximal hip and LE with self care and ADLs  [] (21444) Gait Re-education- Provided training and instruction to the patient for proper LE, core and proximal hip recruitment and positioning and eccentric body weight control with ambulation re-education including up and down stairs     Home Exercise Program:    [x] (15662) Reviewed/Progressed HEP activities related to strengthening, flexibility, endurance, ROM of core, proximal hip and LE for functional self-care, mobility, lifting and ambulation/stair navigation   [] (87608)Reviewed/Progressed HEP activities related to improving balance, coordination, kinesthetic sense, posture, motor skill, proprioception of core, proximal hip and LE for self care, mobility, lifting, and ambulation/stair navigation      Manual Treatments:  PROM / STM / Oscillations-Mobs:  G-I, II, III, IV (PA's, Inf., Post.)  [x] (02425) Provided manual therapy to mobilize LE, proximal hip and/or LS spine soft tissue/joints for the purpose of modulating pain, promoting relaxation,  increasing ROM, reducing/eliminating soft tissue swelling/inflammation/restriction, improving soft tissue extensibility and allowing for proper ROM for normal function with self care, mobility, lifting and ambulation. Modalities: NMES Biphasic with contract relax   [x] GAME READY (VASO)- for significant edema, swelling, pain control.  x10' VASO with R heel prop    Charges: Timed Code Treatment Minutes: 65   Total Treatment Minutes: 75         [] EVAL (LOW) 68832   [] EVAL (MOD) 58238  [] EVAL (HIGH) 38285   [] RE-EVAL     [x] DD(49464) x 1    [] IONTO  [x] NMR (56323)  x 1   [x] VASO  [x] Manual (98690) x2      [] Other:  [] TA x      [] Mech Traction (58161)  [] ES(attended) (72293)      [x] ES (un) (34322): NMES          GOALS:  Patient stated goal: be able to squat; bend knee and no pain   [x] Progressing: [] Met: [] Not Met: [] Adjusted    Therapist goals for Patient:   Short Term Goals: To be achieved in: 2 weeks  1. Independent in HEP and progression per patient tolerance, in order to prevent re-injury. [x] Progressing: [] Met: [] Not Met: [] Adjusted  2. Patient will have a decrease in pain to facilitate improvement in movement, function, and ADLs as indicated by Functional Deficits. [x] Progressing: [] Met: [] Not Met: [] Adjusted    Long Term Goals: To be achieved in: 8-10 weeks  1. Disability index score of 40% or less for the LEFS to assist with reaching prior level of function. [] Progressing: [] Met: [] Not Met: [] Adjusted  2. Patient will demonstrate increased AROM to R knee ext to -2 and flex to 110  to allow for proper joint functioning as indicated by patients Functional Deficits. [x] Progressing: [] Met: [] Not Met: [] Adjusted  3. Patient will demonstrate an increase in Strength to good proximal hip strength and control, within 5lb HHD in LE to allow for proper functional mobility as indicated by patients Functional Deficits. [] Progressing: [] Met: [] Not Met: [] Adjusted  4. Patient will return to being able to ambulate with proper gait without an AD; ascend and descend stairs to basement with reciprocal gait  without increased symptoms or restriction. [] Progressing: [] Met: [] Not Met: [] Adjusted    Progression Towards Functional goals:  [] Patient is progressing as expected towards functional goals listed. [x] Progression is slowed due to complexities listed. [] Progression has been slowed due to co-morbidities. [] Plan just implemented, too soon to assess goals progression  [] Other:         Overall Progression Towards Functional goals/ Treatment Progress Update:  [] Patient is progressing as expected towards functional goals listed. [] Progression is slowed due to complexities/Impairments listed. [] Progression has been slowed due to co-morbidities. [x] Plan just implemented, too soon to assess goals progression <30days   [] Goals require adjustment due to lack of progress  [] Patient is not progressing as expected and requires additional follow up with physician  [] Other :     Prognosis for POC: [x] Good [] Fair  [] Poor      Patient requires continued skilled intervention: [x] Yes  [] No    Treatment/Activity Tolerance:  [] Patient able to complete treatment  [] Patient limited by fatigue  [x] Patient limited by pain    [] Patient limited by other medical complications  [] Other:     ASSESSMENT:  Pt continued to demonstrate significantly decreased extension/flexion range of motion, however she did show improvement again this session with PROM near start of session to be 11 deg of KE and KF to 90 deg. She does demonstrate better quality of movement in her mid range however and does tolerate end range with less pain overall. End of session patient demonstrated improved gait mechanics and ease of R knee motion with gait. She would continue to benefit from skilled physical therapy to maximize her functional outcomes and progress towards goals. PLAN: Cont R knee ROM and strength and normalization of gait mechanics.   Cont to see daily for 1 more week at least then 3x weekly if pt tolerates well.  [] Continue per plan of care [x] Alter current plan (see comments above)  [] Plan of care initiated [] Hold pending MD visit [] Discharge      Electronically signed by:  Saad Hodge, PT , DPT, [address] [address] [id number] Note: If patient does not return for scheduled/ recommended follow up visits, this note will serve as a discharge from care along with most recent update on progress.

## 2021-02-10 ENCOUNTER — HOSPITAL ENCOUNTER (OUTPATIENT)
Dept: PHYSICAL THERAPY | Age: 71
Setting detail: THERAPIES SERIES
Discharge: HOME | End: 2021-02-10
Payer: MEDICARE

## 2021-02-10 PROCEDURE — 97112 NEUROMUSCULAR REEDUCATION: CPT | Performed by: PHYSICAL THERAPIST

## 2021-02-10 PROCEDURE — G0283 ELEC STIM OTHER THAN WOUND: HCPCS | Performed by: PHYSICAL THERAPIST

## 2021-02-10 PROCEDURE — 97016 VASOPNEUMATIC DEVICE THERAPY: CPT | Performed by: PHYSICAL THERAPIST

## 2021-02-10 PROCEDURE — 97140 MANUAL THERAPY 1/> REGIONS: CPT | Performed by: PHYSICAL THERAPIST

## 2021-02-10 PROCEDURE — 97110 THERAPEUTIC EXERCISES: CPT | Performed by: PHYSICAL THERAPIST

## 2021-02-10 NOTE — FLOWSHEET NOTE
[facility], [address]  Phone: [phone]  Fax [phone]        Physical Therapy Treatment Note/ Progress Report:           Date:  2/10/2021    Patient Name:  Chavez Mcneill    :  1950  MRN: [id number]  Restrictions/Precautions:    Medical/Treatment Diagnosis Information:  Diagnosis: s/p r TKR sx date 2020 M25.561; M17.11  Treatment Diagnosis: R knee pain W24.188  Insurance/Certification information:  PT Insurance Information: /[employer]  Physician Information:  Referring Practitioner: Dr. Fredy Awad  Has the plan of care been signed (Y/N):        []  Yes  [x]  No     Date of Patient follow up with Physician: 2/15/21    Is this a Progress Report:     []  Yes  [x]  No        If Yes:  Date Range for reporting period:  Beginning 2021  Ending     Progress report will be due (10 Rx or 30 days whichever is less):       Recertification will be due (POC Duration  / 90 days whichever is less): 2021       Visit # Date Range Insurance Allowable Requires auth   19 2020 BMN    [x]no        []yes:           SUBJECTIVE:         Pain:  10    OBJECTIVE: See below    ? Observation: significantly increased patellar mobility all directions, decreased tightness quad and HS. ? PROM KF with PT  degrees 2/10/21    PT Practice Pattern:H  Co morbidities:1-2  SURgical R TKR 20  INITIAL VISIT 20 CURRENT VISIT 21   PAIN 0-10/10   210   FUNCTIONAL SCALE LEFS () 84%  NT   ROM KE -7 -12 -20 a PT/-10 post    KF 37 55 70 a PT/97 post                               STRENGHT (MMT) Quad tone  trace NT                                              RESTRICTIONS/PRECAUTIONS: OA; osteopenia     Exercises/Interventions:   HEP instruction was provided and handouts given to include:  Access Code: [email or web address]   Patient Portal: [email or web address] [] Progressing: [x] Met: [] Not Met: [] Adjusted    Long Term Goals: To be achieved in: 8-10 weeks  1. Disability index score of 40% or less for the LEFS to assist with reaching prior level of function. [] Progressing: [] Met: [] Not Met: [] Adjusted  2. Patient will demonstrate increased AROM to R knee ext to -2 and flex to 110  to allow for proper joint functioning as indicated by patients Functional Deficits. [x] Progressing: [] Met: [] Not Met: [] Adjusted  3. Patient will demonstrate an increase in Strength to good proximal hip strength and control, within 5lb HHD in LE to allow for proper functional mobility as indicated by patients Functional Deficits. [x] Progressing: [] Met: [] Not Met: [] Adjusted  4. Patient will return to being able to ambulate with proper gait without an AD; ascend and descend stairs to basement with reciprocal gait  without increased symptoms or restriction. [x] Progressing: [] Met: [] Not Met: [] Adjusted    Progression Towards Functional goals:  [] Patient is progressing as expected towards functional goals listed. [x] Progression is slowed due to complexities listed. [] Progression has been slowed due to co-morbidities. [] Plan just implemented, too soon to assess goals progression  [] Other:         Overall Progression Towards Functional goals/ Treatment Progress Update:  [x] Patient is progressing as expected towards functional goals listed. [] Progression is slowed due to complexities/Impairments listed. [] Progression has been slowed due to co-morbidities.   [] Plan just implemented, too soon to assess goals progression <30days   [] Goals require adjustment due to lack of progress  [] Patient is not progressing as expected and requires additional follow up with physician  [] Other :     Prognosis for POC: [x] Good [] Fair  [] Poor      Patient requires continued skilled intervention: [x] Yes  [] No    Treatment/Activity Tolerance: [x] Patient able to complete treatment  [] Patient limited by fatigue  [] Patient limited by pain    [] Patient limited by other medical complications  [] Other:     ASSESSMENT:  Pt demonstrated increased ability for PROM FLX this visit. Her quad and HS tone is decreasing as is her guarding and tightness. Scar mobility is improved this visit. She is responding well to the NMES contract relax. Pt will benefit from continued skilled intervention for ROM, strength and return to function. PLAN: Cont R knee ROM and strength and normalization of gait mechanics. Cont to see daily for 1 more week at least then 3x weekly if pt tolerates well. MD NOTE NV  [] Continue per plan of care [x] Alter current plan (see comments above)  [] Plan of care initiated [] Hold pending MD visit [] Discharge      Electronically signed by:  Billye Jeans, PT , [id number]    Note: If patient does not return for scheduled/ recommended follow up visits, this note will serve as a discharge from care along with most recent update on progress.

## 2021-02-11 ENCOUNTER — HOSPITAL ENCOUNTER (OUTPATIENT)
Dept: PHYSICAL THERAPY | Age: 71
Setting detail: THERAPIES SERIES
Discharge: HOME | End: 2021-02-11
Payer: MEDICARE

## 2021-02-11 PROCEDURE — G0283 ELEC STIM OTHER THAN WOUND: HCPCS | Performed by: PHYSICAL THERAPIST

## 2021-02-11 PROCEDURE — 97110 THERAPEUTIC EXERCISES: CPT | Performed by: PHYSICAL THERAPIST

## 2021-02-11 PROCEDURE — 97112 NEUROMUSCULAR REEDUCATION: CPT | Performed by: PHYSICAL THERAPIST

## 2021-02-11 PROCEDURE — 97016 VASOPNEUMATIC DEVICE THERAPY: CPT | Performed by: PHYSICAL THERAPIST

## 2021-02-11 PROCEDURE — 97140 MANUAL THERAPY 1/> REGIONS: CPT | Performed by: PHYSICAL THERAPIST

## 2021-02-11 NOTE — OP NOTE
[facility],  [address]  Phone: [phone]  Fax [phone]        Physical Therapy Treatment Note/ Progress Report:           Date:  2021    Patient Name:  Bhavana Colon    :  1950  MRN: [id number]  Restrictions/Precautions:    Medical/Treatment Diagnosis Information:  Diagnosis: s/p r TKR sx date 2020 M25.561; M17.11 MINH 20  Treatment Diagnosis: R knee pain R68.864  Insurance/Certification information:  PT Insurance Information: /[employer]  Physician Information:  Referring Practitioner: Dr. Sofi Tyler  Has the plan of care been signed (Y/N):        []  Yes  [x]  No     Date of Patient follow up with Physician: 2/15/21    Is this a Progress Report:     [x]  Yes  []  No        If Yes:  Date Range for reporting period:  Beginning 2021  Ending :21    Progress report will be due (10 Rx or 30 days whichever is less): 2021 (done 21 visit 20)      Recertification will be due (POC Duration  / 90 days whichever is less): 2021       Visit # Date Range Insurance Allowable Requires auth   20 2020- BMN    [x]no        []yes:           SUBJECTIVE:    Pt reports feeling a little sore today but went to  the grocery for the first time since before the TKR yesterday after PT. Pain:  2-3/10    OBJECTIVE: See below    ? Observation: slightly increased tightness in quad today that dissipated with STM. Good patellar mobility slight decrease superiorly. Medial HS and ADD tight and TTP, TPs present. ? PROM KF with PT  degrees 2/10/21  ?  PROM KE with PT    PT Practice Pattern:H  Co morbidities:1-2  SURgical R TKR 20  INITIAL VISIT 20 CURRENT VISIT 21   PAIN 0-10/10   2/10   FUNCTIONAL SCALE LEFS () 84%  (36/) 55%   PROM KE -7 - -10    KF 43 55 101                               STRENGHT (MMT) Quad tone  trace Fair RESTRICTIONS/PRECAUTIONS: OA; osteopenia       GOALS:  Patient stated goal: be able to squat; bend knee and no pain   [x] Progressing: [] Met: [] Not Met: [] Adjusted    Therapist goals for Patient:   Short Term Goals: To be achieved in: 2 weeks  1. Independent in HEP and progression per patient tolerance, in order to prevent re-injury. [] Progressing: [x] Met: [] Not Met: [] Adjusted  2. Patient will have a decrease in pain to facilitate improvement in movement, function, and ADLs as indicated by Functional Deficits. [] Progressing: [x] Met: [] Not Met: [] Adjusted    Long Term Goals: To be achieved in: 8-10 weeks  1. Disability index score of 40% or less for the LEFS to assist with reaching prior level of function. [x] Progressing: [] Met: [] Not Met: [] Adjusted  2. Patient will demonstrate increased AROM to R knee ext to -2 and flex to 110  to allow for proper joint functioning as indicated by patients Functional Deficits. [x] Progressing: [x] Met:50%  [] Not Met: [] Adjusted  3. Patient will demonstrate an increase in Strength to good proximal hip strength and control, within 5lb HHD in LE to allow for proper functional mobility as indicated by patients Functional Deficits. [x] Progressing: [] Met: [] Not Met: [] Adjusted  4. Patient will return to being able to ambulate with proper gait without an AD; ascend and descend stairs to basement with reciprocal gait  without increased symptoms or restriction. [x] Progressing: [] Met: [] Not Met: [] Adjusted      Overall Progression Towards Functional goals/ Treatment Progress Update:  [] Patient is progressing as expected towards functional goals listed. [x] Progression is slowed due to complexities/Impairments listed. [] Progression has been slowed due to co-morbidities.   [] Plan just implemented, too soon to assess goals progression <30days   [] Goals require adjustment due to lack of progress [] Patient is not progressing as expected and requires additional follow up with physician  [] Other :     Prognosis for POC: [x] Good [x] Fair  [] Poor      Patient requires continued skilled intervention: [x] Yes  [] No    Treatment/Activity Tolerance:  [x] Patient able to complete treatment  [] Patient limited by fatigue  [] Patient limited by pain    [] Patient limited by other medical complications  [] Other:     ASSESSMENT:  Pt quickly relaxed and was able to get full soft tissue EXT with little effort at 10 degrees by the end of the session. PROM for EXT feels capsular at 10 degrees and unable to passively move past that degree. Daniella tolerated new contract relax activities well. Pt will benefit from continued skilled intervention for ROM, strength and return to function. PLAN: Cont R knee ROM and strength and normalization of gait mechanics. Cont to see daily for 1 more week at least then 3x weekly if pt tolerates well. [x] Continue per plan of care [] Alter current plan (see comments above)  [] Plan of care initiated [] Hold pending MD visit [] Discharge      Electronically signed by:  Randolph Rowe PT , [id number]    Note: If patient does not return for scheduled/ recommended follow up visits, this note will serve as a discharge from care along with most recent update on progress.

## 2021-02-11 NOTE — FLOWSHEET NOTE
[facility],  [address]  Phone: [phone]  Fax [phone]        Physical Therapy Treatment Note/ Progress Report:           Date:  2021    Patient Name:  Danya Ramsay    :  1950  MRN: [id number]  Restrictions/Precautions:    Medical/Treatment Diagnosis Information:  Diagnosis: s/p r TKR sx date 2020 M25.561; M17.11 MINH 20  Treatment Diagnosis: R knee pain P27.445  Insurance/Certification information:  PT Insurance Information: /humana  Physician Information:  Referring Practitioner: Dr. Lulu Cueto  Has the plan of care been signed (Y/N):        []  Yes  [x]  No     Date of Patient follow up with Physician: 2/15/21    Is this a Progress Report:     [x]  Yes  []  No        If Yes:  Date Range for reporting period:  Beginning 2021  Ending :21    Progress report will be due (10 Rx or 30 days whichever is less): 2021 (done 21 visit 20)      Recertification will be due (POC Duration  / 90 days whichever is less): 2021       Visit # Date Range Insurance Allowable Requires auth   20 2020- BMN    [x]no        []yes:           SUBJECTIVE:    Pt reports feeling a little sore today but went to  the grocery for the first time since before the TKR yesterday after PT. Pain:  2-3/10    OBJECTIVE: See below     Observation: slightly increased tightness in quad today that dissipated with STM. Good patellar mobility slight decrease superiorly. Medial HS and ADD tight and TTP, TPs present.    PROM KF with PT  degrees 2/10/21   PROM KE with PT    PT Practice Pattern:H  Co morbidities:1-2  SURgical R TKR 20  INITIAL VISIT 20 CURRENT VISIT 21   PAIN 0-10/10   2/10   FUNCTIONAL SCALE LEFS () 84%  () 55%   PROM KE -7 -12 -10    KF 43 55 101                               STRENGHT (MMT) Quad tone  trace Fair RESTRICTIONS/PRECAUTIONS: OA; osteopenia     Exercises/Interventions:   HEP instruction was provided and handouts given to include:  Access Code: [email or web address]   Patient Portal: [email or web address]      Therapeutic Ex (62624) Reps/Sets/sec Notes/CUES   april stretch KF/KE stretch 10x10\" ea    Recumbent bike 7' for ROM    Incline stretch 5x30\"    EZ stretch EXT 5'                                                                                                                                 Manual Intervention (06971)     Patellar mobs GII, STM quad ITB HS, ant/post tib mobs GIII, PROM EXT,  x15' Performed prone                                      NMR re-education (42129)  CUES NEEDED                             Biphasic NMES QS with prone HS activation contract relax and supine SAQ quad activation contract relax 12'  10\"/10\"  2.0  5' each  5' each                      Therapeutic Activity (97207)                                         Therapeutic Exercise and NMR EXR  [x] (52784) Provided verbal/tactile cueing for activities related to strengthening, flexibility, endurance, ROM for improvements in LE, proximal hip, and core control with self care, mobility, lifting, ambulation.  [] (02222) Provided verbal/tactile cueing for activities related to improving balance, coordination, kinesthetic sense, posture, motor skill, proprioception  to assist with LE, proximal hip, and core control in self care, mobility, lifting, ambulation and eccentric single leg control.      NMR and Therapeutic Activities:    [x] (15112 or 75893) Provided verbal/tactile cueing for activities related to improving balance, coordination, kinesthetic sense, posture, motor skill, proprioception and motor activation to allow for proper function of core, proximal hip and LE with self care and ADLs  [] (90490) Gait Re-education- Provided training and instruction to the patient for proper LE, core and proximal hip recruitment and positioning and eccentric body weight control with ambulation re-education including up and down stairs     Home Exercise Program:    [x] (77318) Reviewed/Progressed HEP activities related to strengthening, flexibility, endurance, ROM of core, proximal hip and LE for functional self-care, mobility, lifting and ambulation/stair navigation   [] (85591)Reviewed/Progressed HEP activities related to improving balance, coordination, kinesthetic sense, posture, motor skill, proprioception of core, proximal hip and LE for self care, mobility, lifting, and ambulation/stair navigation      Manual Treatments:  PROM / STM / Oscillations-Mobs:  G-I, II, III, IV (PA's, Inf., Post.)  [x] (77607) Provided manual therapy to mobilize LE, proximal hip and/or LS spine soft tissue/joints for the purpose of modulating pain, promoting relaxation,  increasing ROM, reducing/eliminating soft tissue swelling/inflammation/restriction, improving soft tissue extensibility and allowing for proper ROM for normal function with self care, mobility, lifting and ambulation. Modalities: NMES Biphasic with contract relax x12'   [x] GAME READY (VASO)- for significant edema, swelling, pain control. x10' VASO with R heel prop    Charges:  Timed Code Treatment Minutes: 50   Total Treatment Minutes: 60         [] EVAL (LOW) 34065   [] EVAL (MOD) 06802  [] EVAL (HIGH) 73200   [] RE-EVAL     [x] DL(76833) x 1    [] IONTO  [x] NMR (10587)  x 1   [x] VASO  [x] Manual (88335) x1    [] Other:  [] TA x      [] Mech Traction (61586)  [] ES(attended) (90186)      [x] ES (un) (18352): NMES      MEDICARE CAP EXCEPTION DOCUMENTATION      I certify that this patient meets one of the below criteria necessary for becoming an exception to the Medicare cap on therapy services:    [x]  The patient has a condition identified by an ICD-10 code that has a direct and significant impact on the need for therapy.  (Significantly impacts the rate of recovery.) [x]  The patient has a complexity identified by an ICD-10 code that has a direct and significant impact on the need for therapy. (Significantly impacts the rate of recovery and is associated with a primary condition.)         []  The patient has associated variables that influence the amount of treatment to include:  Social support, self-efficacy/motivation, prognosis, time since onset/acuity. []  The patient has generalized musculoskeletal conditions or a condition affecting multiple sites that will have a direct impact on the rate of recovery. []  The patient had a prior episode of outpatient therapy during this calendar year for a different condition. []  The patient has a mental or cognitive disorder in addition to the condition being treated that will have a direct and significant impact on the rate of recovery. GOALS:  Patient stated goal: be able to squat; bend knee and no pain   [x] Progressing: [] Met: [] Not Met: [] Adjusted    Therapist goals for Patient:   Short Term Goals: To be achieved in: 2 weeks  1. Independent in HEP and progression per patient tolerance, in order to prevent re-injury. [] Progressing: [x] Met: [] Not Met: [] Adjusted  2. Patient will have a decrease in pain to facilitate improvement in movement, function, and ADLs as indicated by Functional Deficits. [] Progressing: [x] Met: [] Not Met: [] Adjusted    Long Term Goals: To be achieved in: 8-10 weeks  1. Disability index score of 40% or less for the LEFS to assist with reaching prior level of function. [x] Progressing: [] Met: [] Not Met: [] Adjusted  2. Patient will demonstrate increased AROM to R knee ext to -2 and flex to 110  to allow for proper joint functioning as indicated by patients Functional Deficits. [x] Progressing: [x] Met:50%  [] Not Met: [] Adjusted  3.  Patient will demonstrate an increase in Strength to good proximal hip strength and control, within 5lb HHD in LE to allow for proper functional mobility as indicated by patients Functional Deficits. [x] Progressing: [] Met: [] Not Met: [] Adjusted  4. Patient will return to being able to ambulate with proper gait without an AD; ascend and descend stairs to basement with reciprocal gait  without increased symptoms or restriction. [x] Progressing: [] Met: [] Not Met: [] Adjusted      Overall Progression Towards Functional goals/ Treatment Progress Update:  [] Patient is progressing as expected towards functional goals listed. [x] Progression is slowed due to complexities/Impairments listed. [] Progression has been slowed due to co-morbidities. [] Plan just implemented, too soon to assess goals progression <30days   [] Goals require adjustment due to lack of progress  [] Patient is not progressing as expected and requires additional follow up with physician  [] Other :     Prognosis for POC: [x] Good [x] Fair  [] Poor      Patient requires continued skilled intervention: [x] Yes  [] No    Treatment/Activity Tolerance:  [x] Patient able to complete treatment  [] Patient limited by fatigue  [] Patient limited by pain    [] Patient limited by other medical complications  [] Other:     ASSESSMENT:  Pt quickly relaxed and was able to get full soft tissue EXT with little effort at 10 degrees by the end of the session. PROM for EXT feels capsular at 10 degrees and unable to passively move past that degree. Daniella tolerated new contract relax activities well. Pt will benefit from continued skilled intervention for ROM, strength and return to function. PLAN: Cont R knee ROM and strength and normalization of gait mechanics. Cont to see daily for 1 more week at least then 3x weekly if pt tolerates well.    [x] Continue per plan of care [] Alter current plan (see comments above)  [] Plan of care initiated [] Hold pending MD visit [] Discharge      Electronically signed by:  Laureano Feng, PT , [id number]    Note: If patient does not return for scheduled/ recommended follow up visits, this note will serve as a discharge from care along with most recent update on progress.

## 2021-02-11 NOTE — PROGRESS NOTES
[facility],  [address]  Phone: [phone]  Fax [phone]        Physical Therapy Treatment Note/ Progress Report:           Date:  2021    Patient Name:  Carolyn Hayes    :  1950  MRN: [id number]  Restrictions/Precautions:    Medical/Treatment Diagnosis Information:  Diagnosis: s/p r TKR sx date 2020 M25.561; M17.11 MINH 20  Treatment Diagnosis: R knee pain S34.338  Insurance/Certification information:  PT Insurance Information: /humana  Physician Information:  Referring Practitioner: Dr. Domingo Gonzalez  Has the plan of care been signed (Y/N):        []  Yes  [x]  No     Date of Patient follow up with Physician: 2/15/21    Is this a Progress Report:     [x]  Yes  []  No        If Yes:  Date Range for reporting period:  Beginning 2021  Ending :21    Progress report will be due (10 Rx or 30 days whichever is less): 2021 (done 21 visit 20)      Recertification will be due (POC Duration  / 90 days whichever is less): 2021       Visit # Date Range Insurance Allowable Requires auth   20 2020- BMN    [x]no        []yes:           SUBJECTIVE:    Pt reports feeling a little sore today but went to  the grocery for the first time since before the TKR yesterday after PT. Pain:  2-310    OBJECTIVE: See below    ? Observation: slightly increased tightness in quad today that dissipated with STM. Good patellar mobility slight decrease superiorly. Medial HS and ADD tight and TTP, TPs present. ? PROM KF with PT  degrees 2/10/21  ?  PROM KE with PT    PT Practice Pattern:H  Co morbidities:1-2  SURgical R TKR 20  INITIAL VISIT 20 CURRENT VISIT 21   PAIN 0-10/10   2/10   FUNCTIONAL SCALE LEFS () 84%  (36/) 55%   PROM KE -7 - -10    KF 43 55 101                               STRENGHT (MMT) Quad tone  trace Fair RESTRICTIONS/PRECAUTIONS: OA; osteopenia       GOALS:  Patient stated goal: be able to squat; bend knee and no pain   [x] Progressing: [] Met: [] Not Met: [] Adjusted    Therapist goals for Patient:   Short Term Goals: To be achieved in: 2 weeks  1. Independent in HEP and progression per patient tolerance, in order to prevent re-injury. [] Progressing: [x] Met: [] Not Met: [] Adjusted  2. Patient will have a decrease in pain to facilitate improvement in movement, function, and ADLs as indicated by Functional Deficits. [] Progressing: [x] Met: [] Not Met: [] Adjusted    Long Term Goals: To be achieved in: 8-10 weeks  1. Disability index score of 40% or less for the LEFS to assist with reaching prior level of function. [x] Progressing: [] Met: [] Not Met: [] Adjusted  2. Patient will demonstrate increased AROM to R knee ext to -2 and flex to 110  to allow for proper joint functioning as indicated by patients Functional Deficits. [x] Progressing: [x] Met:50%  [] Not Met: [] Adjusted  3. Patient will demonstrate an increase in Strength to good proximal hip strength and control, within 5lb HHD in LE to allow for proper functional mobility as indicated by patients Functional Deficits. [x] Progressing: [] Met: [] Not Met: [] Adjusted  4. Patient will return to being able to ambulate with proper gait without an AD; ascend and descend stairs to basement with reciprocal gait  without increased symptoms or restriction. [x] Progressing: [] Met: [] Not Met: [] Adjusted      Overall Progression Towards Functional goals/ Treatment Progress Update:  [] Patient is progressing as expected towards functional goals listed. [x] Progression is slowed due to complexities/Impairments listed. [] Progression has been slowed due to co-morbidities.   [] Plan just implemented, too soon to assess goals progression <30days   [] Goals require adjustment due to lack of progress [] Patient is not progressing as expected and requires additional follow up with physician  [] Other :     Prognosis for POC: [x] Good [x] Fair  [] Poor      Patient requires continued skilled intervention: [x] Yes  [] No    Treatment/Activity Tolerance:  [x] Patient able to complete treatment  [] Patient limited by fatigue  [] Patient limited by pain    [] Patient limited by other medical complications  [] Other:     ASSESSMENT:  Pt quickly relaxed and was able to get full soft tissue EXT with little effort at 10 degrees by the end of the session. PROM for EXT feels capsular at 10 degrees and unable to passively move past that degree. Daniella tolerated new contract relax activities well. Pt will benefit from continued skilled intervention for ROM, strength and return to function. PLAN: Cont R knee ROM and strength and normalization of gait mechanics. Cont to see daily for 1 more week at least then 3x weekly if pt tolerates well. [x] Continue per plan of care [] Alter current plan (see comments above)  [] Plan of care initiated [] Hold pending MD visit [] Discharge      Electronically signed by:  Shelly Chowdhury, PT , [id number]    Note: If patient does not return for scheduled/ recommended follow up visits, this note will serve as a discharge from care along with most recent update on progress.

## 2021-02-12 ENCOUNTER — HOSPITAL ENCOUNTER (OUTPATIENT)
Dept: PHYSICAL THERAPY | Age: 71
Setting detail: THERAPIES SERIES
Discharge: HOME | End: 2021-02-12
Payer: MEDICARE

## 2021-02-12 PROCEDURE — 97110 THERAPEUTIC EXERCISES: CPT

## 2021-02-12 PROCEDURE — 97140 MANUAL THERAPY 1/> REGIONS: CPT

## 2021-02-12 PROCEDURE — 97016 VASOPNEUMATIC DEVICE THERAPY: CPT

## 2021-02-12 PROCEDURE — G0283 ELEC STIM OTHER THAN WOUND: HCPCS

## 2021-02-12 PROCEDURE — 97112 NEUROMUSCULAR REEDUCATION: CPT

## 2021-02-12 NOTE — FLOWSHEET NOTE
[facility],  [address]  Phone: [phone]  Fax [phone]        Physical Therapy Treatment Note/ Progress Report:           Date:  2021    Patient Name:  Nazario Roberts    :  1950  MRN: [id number]  Restrictions/Precautions:    Medical/Treatment Diagnosis Information:  Diagnosis: s/p r TKR sx date 2020 M25.561; M17.11 MINH 20  Treatment Diagnosis: R knee pain V00.266  Insurance/Certification information:  PT Insurance Information: /humana  Physician Information:  Referring Practitioner: Dr. Eric Kelsey  Has the plan of care been signed (Y/N):        []  Yes  [x]  No     Date of Patient follow up with Physician: 2/15/21    Is this a Progress Report:     []  Yes  [x]  No        If Yes:  Date Range for reporting period:  Beginning 2021  Ending :21    Progress report will be due (10 Rx or 30 days whichever is less): 2021 (done 21 visit 20)      Recertification will be due (POC Duration  / 90 days whichever is less): 2021       Visit # Date Range Insurance Allowable Requires auth   21 2020- BMN    [x]no        []yes:           SUBJECTIVE:    Pt reports feeling pretty good today. Notes her knee feels it is moving better. Pain:  2-3/10    OBJECTIVE: See below     Observation: slightly increased tightness in quad today that dissipated with STM. Good patellar mobility slight decrease superiorly. Medial HS and ADD tight and TTP, TPs present.    PROM KF with PT  degrees 2/10/21   PROM KE with PT    PT Practice Pattern:H  Co morbidities:1-2  SURgical R TKR 20  INITIAL VISIT 20 CURRENT VISIT 21   PAIN 0-10/10   2/10   FUNCTIONAL SCALE LEFS () 84%  (36/80) 55%   PROM KE -7 -12 -5 deg    KF 43 55 101                               STRENGHT (MMT) Quad tone  trace Fair                                              RESTRICTIONS/PRECAUTIONS: OA; osteopenia     Exercises/Interventions:   HEP instruction was provided and handouts given to include:  Access Code: [email or web address]   Patient Portal: [email or web address]      Therapeutic Ex (16671) Reps/Sets/sec Notes/CUES   april stretch KF/KE stretch 10x10\" ea    Recumbent bike 8' for ROM    Incline stretch 5x30\"    EZ stretch FLX 5'                    Manual Intervention (79702) x30'    Patellar mobs GIII, IASTM quad ITB HS, ant/post tib mobs GIII, PROM EXT, manual HS stretching   Performed prone   Supine SLR MWM for KE     Seated MWM for KF EOP               NMR re-education (83465)  CUES NEEDED   Biphasic NMES QS in long sitting HS activation contract relax and seated SAQ quad activation contract relax 15'  10\"/10\"  2.0 ramp  5' each  5' each       Toe taps on 6\" step followed by stair retraining with reciprocal pattern 3'    Gait retraining 3'         Therapeutic Activity (24005)                                         Therapeutic Exercise and NMR EXR  [x] (17805) Provided verbal/tactile cueing for activities related to strengthening, flexibility, endurance, ROM for improvements in LE, proximal hip, and core control with self care, mobility, lifting, ambulation.  [] (11807) Provided verbal/tactile cueing for activities related to improving balance, coordination, kinesthetic sense, posture, motor skill, proprioception  to assist with LE, proximal hip, and core control in self care, mobility, lifting, ambulation and eccentric single leg control.      NMR and Therapeutic Activities:    [x] (80415 or 25782) Provided verbal/tactile cueing for activities related to improving balance, coordination, kinesthetic sense, posture, motor skill, proprioception and motor activation to allow for proper function of core, proximal hip and LE with self care and ADLs  [] (32510) Gait Re-education- Provided training and instruction to the patient for proper LE, core and proximal hip recruitment and positioning and eccentric body weight control with ambulation re-education including up and down stairs     Home Exercise Program:    [x] (83213) Reviewed/Progressed HEP activities related to strengthening, flexibility, endurance, ROM of core, proximal hip and LE for functional self-care, mobility, lifting and ambulation/stair navigation   [] (28568)Reviewed/Progressed HEP activities related to improving balance, coordination, kinesthetic sense, posture, motor skill, proprioception of core, proximal hip and LE for self care, mobility, lifting, and ambulation/stair navigation      Manual Treatments:  PROM / STM / Oscillations-Mobs:  G-I, II, III, IV (PA's, Inf., Post.)  [x] (13459) Provided manual therapy to mobilize LE, proximal hip and/or LS spine soft tissue/joints for the purpose of modulating pain, promoting relaxation,  increasing ROM, reducing/eliminating soft tissue swelling/inflammation/restriction, improving soft tissue extensibility and allowing for proper ROM for normal function with self care, mobility, lifting and ambulation. Modalities: NMES Biphasic with contract relax x15'   [x] GAME READY (VASO)- for significant edema, swelling, pain control. x10' VASO with R heel prop    Charges:  Timed Code Treatment Minutes: 76'   Total Treatment Minutes: 66'         [] EVAL (LOW) 455 1011   [] EVAL (MOD) 30411  [] EVAL (HIGH) 98684   [] RE-EVAL     [x] HV(86713) x 1    [] IONTO  [x] NMR (95498)  x 1   [x] VASO  [x] Manual (89785) x2    [] Other:  [] TA x      [] Mech Traction (96687)  [] ES(attended) (01781)      [x] ES (un) (66357): NMES      MEDICARE CAP EXCEPTION DOCUMENTATION      I certify that this patient meets one of the below criteria necessary for becoming an exception to the Medicare cap on therapy services:    [x]  The patient has a condition identified by an ICD-10 code that has a direct and significant impact on the need for therapy.  (Significantly impacts the rate of recovery.)                   [x]  The patient has a complexity identified by an ICD-10 code that has a direct and significant impact on the need for therapy. (Significantly impacts the rate of recovery and is associated with a primary condition.)         []  The patient has associated variables that influence the amount of treatment to include:  Social support, self-efficacy/motivation, prognosis, time since onset/acuity. []  The patient has generalized musculoskeletal conditions or a condition affecting multiple sites that will have a direct impact on the rate of recovery. []  The patient had a prior episode of outpatient therapy during this calendar year for a different condition. []  The patient has a mental or cognitive disorder in addition to the condition being treated that will have a direct and significant impact on the rate of recovery. GOALS:  Patient stated goal: be able to squat; bend knee and no pain   [x] Progressing: [] Met: [] Not Met: [] Adjusted    Therapist goals for Patient:   Short Term Goals: To be achieved in: 2 weeks  1. Independent in HEP and progression per patient tolerance, in order to prevent re-injury. [] Progressing: [x] Met: [] Not Met: [] Adjusted  2. Patient will have a decrease in pain to facilitate improvement in movement, function, and ADLs as indicated by Functional Deficits. [] Progressing: [x] Met: [] Not Met: [] Adjusted    Long Term Goals: To be achieved in: 8-10 weeks  1. Disability index score of 40% or less for the LEFS to assist with reaching prior level of function. [x] Progressing: [] Met: [] Not Met: [] Adjusted  2. Patient will demonstrate increased AROM to R knee ext to -2 and flex to 110  to allow for proper joint functioning as indicated by patients Functional Deficits. [x] Progressing: [x] Met:50%  [] Not Met: [] Adjusted  3.  Patient will demonstrate an increase in Strength to good proximal hip strength and control, within 5lb HHD in LE to allow for proper functional mobility as indicated by patients Functional Deficits. [x] Progressing: [] Met: [] Not Met: [] Adjusted  4. Patient will return to being able to ambulate with proper gait without an AD; ascend and descend stairs to basement with reciprocal gait  without increased symptoms or restriction. [x] Progressing: [] Met: [] Not Met: [] Adjusted      Overall Progression Towards Functional goals/ Treatment Progress Update:  [] Patient is progressing as expected towards functional goals listed. [x] Progression is slowed due to complexities/Impairments listed. [] Progression has been slowed due to co-morbidities. [] Plan just implemented, too soon to assess goals progression <30days   [] Goals require adjustment due to lack of progress  [] Patient is not progressing as expected and requires additional follow up with physician  [] Other :     Prognosis for POC: [x] Good [x] Fair  [] Poor      Patient requires continued skilled intervention: [x] Yes  [] No    Treatment/Activity Tolerance:  [x] Patient able to complete treatment  [] Patient limited by fatigue  [] Patient limited by pain    [] Patient limited by other medical complications  [] Other:     ASSESSMENT:  Pt is demonstrating improved KE beginning of session with greater ease with ROM. PROM for EXT feels capsular, following mobilizations lacking 5 deg was achieved this date. Franky Johnson was then able to move through increased knee ROM during functional gait and stair retraining with greater ease and more normalized mechanics. Pt will benefit from continued skilled intervention for ROM, strength and return to function. PLAN: Cont R knee ROM and strength and normalization of gait mechanics. Cont to see daily for 1 more week at least then 3x weekly if pt tolerates well.    [x] Continue per plan of care [] Alter current plan (see comments above)  [] Plan of care initiated [] Hold pending MD visit [] Discharge      Electronically signed by:  Odilia Negro, PT , DPT, [facility] [id number]     Note: If patient does not return for scheduled/ recommended follow up visits, this note will serve as a discharge from care along with most recent update on progress.

## 2021-02-15 ENCOUNTER — HOSPITAL ENCOUNTER (OUTPATIENT)
Dept: PHYSICAL THERAPY | Age: 71
Setting detail: THERAPIES SERIES
Discharge: HOME | End: 2021-02-15
Payer: MEDICARE

## 2021-02-15 PROCEDURE — 97110 THERAPEUTIC EXERCISES: CPT

## 2021-02-15 PROCEDURE — 97112 NEUROMUSCULAR REEDUCATION: CPT

## 2021-02-15 PROCEDURE — G0283 ELEC STIM OTHER THAN WOUND: HCPCS

## 2021-02-15 PROCEDURE — 97140 MANUAL THERAPY 1/> REGIONS: CPT

## 2021-02-15 NOTE — FLOWSHEET NOTE
[facility],  [address] [place]  Phone: [phone]  Fax [phone]        Physical Therapy Treatment Note/ Progress Report:           Date:  2/15/2021    Patient Name:  Julius Medina    :  1950  MRN: [id number]  Restrictions/Precautions:    Medical/Treatment Diagnosis Information:  Diagnosis: s/p r TKR sx date 2020 M25.561; M17.11 MINH 20  Treatment Diagnosis: R knee pain R09.998  Insurance/Certification information:  PT Insurance Information: /humana  Physician Information:  Referring Practitioner: Dr. Robles Prim  Has the plan of care been signed (Y/N):        []  Yes  [x]  No     Date of Patient follow up with Physician: 2/15/21    Is this a Progress Report:     []  Yes  [x]  No        If Yes:  Date Range for reporting period:  Beginning 2021  Ending :21    Progress report will be due (10 Rx or 30 days whichever is less): 2021 (done 21 visit 20)      Recertification will be due (POC Duration  / 90 days whichever is less): 2021       Visit # Date Range Insurance Allowable Requires auth   22 2020- BMN    [x]no        []yes:           SUBJECTIVE:    Pt reports no significant issues since her last session. She has her next follow up this morning with her surgeon. Pain:  2-3/10    OBJECTIVE: See below     Observation: slightly increased tightness in quad today that dissipated with STM. Good patellar mobility slight decrease superiorly. Medial HS and ADD tight and TTP, TPs present.     Test measurements: see below    PT Practice Pattern:H  Co morbidities:1-2  SURgical R TKR 20  INITIAL VISIT 20 CURRENT VISIT 2/15/21   PAIN 0-10/10   2/10   FUNCTIONAL SCALE LEFS () 84%  (36/) 55%   PROM KE -7 -12 -4 deg    KF 43 55 101                               STRENGHT (MMT) Quad tone  trace Fair RESTRICTIONS/PRECAUTIONS: OA; osteopenia     Exercises/Interventions:   HEP instruction was provided and handouts given to include:  Access Code: [email or web address]   Patient Portal: [email or web address]      Therapeutic Ex (41369) Reps/Sets/sec Notes/CUES   april stretch KF/KE stretch 10x10\" ea    Recumbent bike 6' for ROM    Incline stretch 5x30\"    EZ stretch FLX 5'                    Manual Intervention (68921) x30'    Patellar mobs GIII, IASTM quad ITB HS, ant/post tib mobs GIII, PROM EXT, manual HS stretching   Performed prone   Supine SLR MWM for KE     Seated MWM for KF EOP               NMR re-education (96067)  CUES NEEDED   Biphasic NMES QS in long sitting HS activation contract relax and seated SAQ quad activation contract relax 14'  10\"/10\"  2.0 ramp  7' each  7' each       Toe taps on 6\" step followed by stair retraining with reciprocal pattern 3'    Gait retraining 3'         Therapeutic Activity (66984)                                         Therapeutic Exercise and NMR EXR  [x] (24288) Provided verbal/tactile cueing for activities related to strengthening, flexibility, endurance, ROM for improvements in LE, proximal hip, and core control with self care, mobility, lifting, ambulation.  [] (43733) Provided verbal/tactile cueing for activities related to improving balance, coordination, kinesthetic sense, posture, motor skill, proprioception  to assist with LE, proximal hip, and core control in self care, mobility, lifting, ambulation and eccentric single leg control.      NMR and Therapeutic Activities:    [x] (92235 or 32638) Provided verbal/tactile cueing for activities related to improving balance, coordination, kinesthetic sense, posture, motor skill, proprioception and motor activation to allow for proper function of core, proximal hip and LE with self care and ADLs  [] (62731) Gait Re-education- Provided training and instruction to the patient for proper LE, core and proximal hip recruitment and positioning and eccentric body weight control with ambulation re-education including up and down stairs     Home Exercise Program:    [x] (50409) Reviewed/Progressed HEP activities related to strengthening, flexibility, endurance, ROM of core, proximal hip and LE for functional self-care, mobility, lifting and ambulation/stair navigation   [] (86171)Reviewed/Progressed HEP activities related to improving balance, coordination, kinesthetic sense, posture, motor skill, proprioception of core, proximal hip and LE for self care, mobility, lifting, and ambulation/stair navigation      Manual Treatments:  PROM / STM / Oscillations-Mobs:  G-I, II, III, IV (PA's, Inf., Post.)  [x] (13261) Provided manual therapy to mobilize LE, proximal hip and/or LS spine soft tissue/joints for the purpose of modulating pain, promoting relaxation,  increasing ROM, reducing/eliminating soft tissue swelling/inflammation/restriction, improving soft tissue extensibility and allowing for proper ROM for normal function with self care, mobility, lifting and ambulation. Modalities: NMES Biphasic with contract relax x14'   [] GAME READY (VASO)- for significant edema, swelling, pain control. Did not ice today due to time constraints with patient having her next MD follow up appt. Charges:  Timed Code Treatment Minutes: 61'   Total Treatment Minutes: 61'         [] EVAL (LOW) 455 1011   [] EVAL (MOD) 42033  [] EVAL (HIGH) 96383   [] RE-EVAL     [x] AW(86376) x 1    [] IONTO  [x] NMR (41713)  x 1   [] VASO  [x] Manual (65816) x2    [] Other:  [] TA x      [] Mech Traction (51602)  [] ES(attended) (69627)      [x] ES (un) (92780): NMES      MEDICARE CAP EXCEPTION DOCUMENTATION      I certify that this patient meets one of the below criteria necessary for becoming an exception to the Medicare cap on therapy services:    [x]  The patient has a condition identified by an ICD-10 code that has a direct and significant impact on the need for therapy. (Significantly impacts the rate of recovery.)                   [x]  The patient has a complexity identified by an ICD-10 code that has a direct and significant impact on the need for therapy. (Significantly impacts the rate of recovery and is associated with a primary condition.)         []  The patient has associated variables that influence the amount of treatment to include:  Social support, self-efficacy/motivation, prognosis, time since onset/acuity. []  The patient has generalized musculoskeletal conditions or a condition affecting multiple sites that will have a direct impact on the rate of recovery. []  The patient had a prior episode of outpatient therapy during this calendar year for a different condition. []  The patient has a mental or cognitive disorder in addition to the condition being treated that will have a direct and significant impact on the rate of recovery. GOALS:  Patient stated goal: be able to squat; bend knee and no pain   [x] Progressing: [] Met: [] Not Met: [] Adjusted    Therapist goals for Patient:   Short Term Goals: To be achieved in: 2 weeks  1. Independent in HEP and progression per patient tolerance, in order to prevent re-injury. [] Progressing: [x] Met: [] Not Met: [] Adjusted  2. Patient will have a decrease in pain to facilitate improvement in movement, function, and ADLs as indicated by Functional Deficits. [] Progressing: [x] Met: [] Not Met: [] Adjusted    Long Term Goals: To be achieved in: 8-10 weeks  1. Disability index score of 40% or less for the LEFS to assist with reaching prior level of function. [x] Progressing: [] Met: [] Not Met: [] Adjusted  2. Patient will demonstrate increased AROM to R knee ext to -2 and flex to 110  to allow for proper joint functioning as indicated by patients Functional Deficits. [x] Progressing: [x] Met:50%  [] Not Met: [] Adjusted  3.  Patient will demonstrate an increase in Strength to good proximal 1 more week at least then 3x weekly if pt tolerates well. [x] Continue per plan of care [] Alter current plan (see comments above)  [] Plan of care initiated [] Hold pending MD visit [] Discharge      Electronically signed by:  John Reeder, PT , DPT, [facility] [id number]     Note: If patient does not return for scheduled/ recommended follow up visits, this note will serve as a discharge from care along with most recent update on progress.

## 2021-02-15 NOTE — OP NOTE
[facility],  [address] [place]  Phone: [phone]  Fax [phone]        Physical Therapy Treatment Note/ Op Note:           Date:  2/15/2021    Patient Name:  Chavez Mcneill    :  1950  MRN: [id number]  Restrictions/Precautions:    Medical/Treatment Diagnosis Information:  Diagnosis: s/p r TKR sx date 2020 M25.561; M17.11 MINH 20  Treatment Diagnosis: R knee pain E76.455  Insurance/Certification information:  PT Insurance Information: /humana  Physician Information:  Referring Practitioner: Dr. Fredy Awad  Has the plan of care been signed (Y/N):        []  Yes  [x]  No     Date of Patient follow up with Physician: 2/15/21    Is this a Progress Report:     []  Yes  [x]  No        If Yes:  Date Range for reporting period:  Beginning 2021  Ending :21    Progress report will be due (10 Rx or 30 days whichever is less): 2021 (done 21 visit 20)      Recertification will be due (POC Duration  / 90 days whichever is less): 2021       Visit # Date Range Insurance Allowable Requires auth   22 2020- BMN    [x]no        []yes:           SUBJECTIVE:    Pt reports no significant issues since her last session. She has her next follow up this morning with her surgeon. Pain:  2-3/10    OBJECTIVE: See below    ? Observation: slightly increased tightness in quad today that dissipated with STM. Good patellar mobility slight decrease superiorly. Medial HS and ADD tight and TTP, TPs present. ?  Test measurements: see below    PT Practice Pattern:H  Co morbidities:1-2  SURgical R TKR 20  INITIAL VISIT 20 CURRENT VISIT 2/15/21   PAIN 0-10/10   2/10   FUNCTIONAL SCALE LEFS () 84%  (36/) 55%   PROM KE -7 -12 -4 deg    KF 43 55 101                               STRENGHT (MMT) Quad tone  trace Fair RESTRICTIONS/PRECAUTIONS: OA; osteopenia         Therapeutic Exercise and NMR EXR  [x] (34595) Provided verbal/tactile cueing for activities related to strengthening, flexibility, endurance, ROM for improvements in LE, proximal hip, and core control with self care, mobility, lifting, ambulation.  [] (09163) Provided verbal/tactile cueing for activities related to improving balance, coordination, kinesthetic sense, posture, motor skill, proprioception  to assist with LE, proximal hip, and core control in self care, mobility, lifting, ambulation and eccentric single leg control.      NMR and Therapeutic Activities:    [x] (58714 or 20195) Provided verbal/tactile cueing for activities related to improving balance, coordination, kinesthetic sense, posture, motor skill, proprioception and motor activation to allow for proper function of core, proximal hip and LE with self care and ADLs  [] (15834) Gait Re-education- Provided training and instruction to the patient for proper LE, core and proximal hip recruitment and positioning and eccentric body weight control with ambulation re-education including up and down stairs     Home Exercise Program:    [x] (59294) Reviewed/Progressed HEP activities related to strengthening, flexibility, endurance, ROM of core, proximal hip and LE for functional self-care, mobility, lifting and ambulation/stair navigation   [] (84017)Reviewed/Progressed HEP activities related to improving balance, coordination, kinesthetic sense, posture, motor skill, proprioception of core, proximal hip and LE for self care, mobility, lifting, and ambulation/stair navigation      Manual Treatments:  PROM / STM / Oscillations-Mobs:  G-I, II, III, IV (PA's, Inf., Post.) [x] (48861) Provided manual therapy to mobilize LE, proximal hip and/or LS spine soft tissue/joints for the purpose of modulating pain, promoting relaxation,  increasing ROM, reducing/eliminating soft tissue swelling/inflammation/restriction, improving soft tissue extensibility and allowing for proper ROM for normal function with self care, mobility, lifting and ambulation. Modalities: NMES Biphasic with contract relax x14'   [] GAME READY (VASO)- for significant edema, swelling, pain control. Did not ice today due to time constraints with patient having her next MD follow up appt. Charges:  Timed Code Treatment Minutes: 61'   Total Treatment Minutes: 61'         [] EVAL (LOW) 455 1011   [] EVAL (MOD) 35099  [] EVAL (HIGH) 58522   [] RE-EVAL     [x] LQ(19867) x 1    [] IONTO  [x] NMR (67969)  x 1   [] VASO  [x] Manual (69494) x2    [] Other:  [] TA x      [] Mech Traction (90389)  [] ES(attended) (49519)      [x] ES (un) (51565): NMES           GOALS:  Patient stated goal: be able to squat; bend knee and no pain   [x] Progressing: [] Met: [] Not Met: [] Adjusted    Therapist goals for Patient:   Short Term Goals: To be achieved in: 2 weeks  1. Independent in HEP and progression per patient tolerance, in order to prevent re-injury. [] Progressing: [x] Met: [] Not Met: [] Adjusted  2. Patient will have a decrease in pain to facilitate improvement in movement, function, and ADLs as indicated by Functional Deficits. [] Progressing: [x] Met: [] Not Met: [] Adjusted    Long Term Goals: To be achieved in: 8-10 weeks  1. Disability index score of 40% or less for the LEFS to assist with reaching prior level of function. [x] Progressing: [] Met: [] Not Met: [] Adjusted  2. Patient will demonstrate increased AROM to R knee ext to -2 and flex to 110  to allow for proper joint functioning as indicated by patients Functional Deficits.    [x] Progressing: [x] Met:50%  [] Not Met: [] Adjusted 3. Patient will demonstrate an increase in Strength to good proximal hip strength and control, within 5lb HHD in LE to allow for proper functional mobility as indicated by patients Functional Deficits. [x] Progressing: [] Met: [] Not Met: [] Adjusted  4. Patient will return to being able to ambulate with proper gait without an AD; ascend and descend stairs to basement with reciprocal gait  without increased symptoms or restriction. [x] Progressing: [] Met: [] Not Met: [] Adjusted      Overall Progression Towards Functional goals/ Treatment Progress Update:  [] Patient is progressing as expected towards functional goals listed. [x] Progression is slowed due to complexities/Impairments listed. [] Progression has been slowed due to co-morbidities. [] Plan just implemented, too soon to assess goals progression <30days   [] Goals require adjustment due to lack of progress  [] Patient is not progressing as expected and requires additional follow up with physician  [] Other :     Prognosis for POC: [x] Good [x] Fair  [] Poor      Patient requires continued skilled intervention: [x] Yes  [] No    Treatment/Activity Tolerance:  [x] Patient able to complete treatment  [] Patient limited by fatigue  [] Patient limited by pain    [] Patient limited by other medical complications  [] Other:     ASSESSMENT:  Pt is demonstrating improved KE beginning of session with greater ease with ROM. PROM is now lacking 4 degrees of knee extension and knee flexion to approximately 100 degrees. Payton Gowers was then able to move through increased knee ROM during functional gait and stair retraining with greater ease and more normalized mechanics. Patient is currently scheduled daily for this week and next and appears to be tolerating treatment sessions well. We will continue to assess her need and tolerance for PT. Pt will benefit from continued skilled intervention for ROM, strength and return to function.

## 2021-02-16 ENCOUNTER — HOSPITAL ENCOUNTER (OUTPATIENT)
Dept: PHYSICAL THERAPY | Age: 71
Setting detail: THERAPIES SERIES
Discharge: HOME | End: 2021-02-16
Payer: MEDICARE

## 2021-02-16 NOTE — FLOWSHEET NOTE
[facility],  [address]        Physical Therapy  Cancellation/No-show Note  Patient Name:  Adalgisa Sage  :  1950   Date:  2021  Cancelled visits to date: 1  No-shows to date: 0    For today's appointment patient:  [x]  Cancelled  []  Rescheduled appointment  []  No-show     Reason given by patient:  []  Patient ill  []  Conflicting appointment  []  No transportation    []  Conflict with work  []  No reason given  [x]  Other:   Weather    Comments:      Electronically signed by:  Reena Guillory, PT, DPT, [id number]

## 2021-02-17 ENCOUNTER — HOSPITAL ENCOUNTER (OUTPATIENT)
Dept: PHYSICAL THERAPY | Age: 71
Setting detail: THERAPIES SERIES
Discharge: HOME | End: 2021-02-17
Payer: MEDICARE

## 2021-02-17 PROCEDURE — 97112 NEUROMUSCULAR REEDUCATION: CPT | Performed by: PHYSICAL THERAPIST

## 2021-02-17 PROCEDURE — 97016 VASOPNEUMATIC DEVICE THERAPY: CPT | Performed by: PHYSICAL THERAPIST

## 2021-02-17 PROCEDURE — 97140 MANUAL THERAPY 1/> REGIONS: CPT | Performed by: PHYSICAL THERAPIST

## 2021-02-17 PROCEDURE — 97110 THERAPEUTIC EXERCISES: CPT | Performed by: PHYSICAL THERAPIST

## 2021-02-17 NOTE — FLOWSHEET NOTE
[facility], [address] [place]  Phone: [phone]  Fax [phone]        Physical Therapy Treatment Note/ Progress Report:           Date:  2021    Patient Name:  Bhavana Colon    :  1950  MRN: [id number]  Restrictions/Precautions:    Medical/Treatment Diagnosis Information:  Diagnosis: s/p r TKR sx date 2020 M25.561; M17.11 MINH 20  Treatment Diagnosis: R knee pain D42.188  Insurance/Certification information:  PT Insurance Information: /humana  Physician Information:  Referring Practitioner: Dr. Sofi Tyler  Has the plan of care been signed (Y/N):        []  Yes  [x]  No     Date of Patient follow up with Physician:     Is this a Progress Report:     []  Yes  [x]  No        If Yes:  Date Range for reporting period:  Beginning 2021  Ending :21    Progress report will be due (10 Rx or 30 days whichever is less): 2021 (done 21 visit 20) Due 3/11/21 visit 30      Recertification will be due (POC Duration  / 90 days whichever is less): 2021       Visit # Date Range Insurance Allowable Requires auth   23 2020- BMN    [x]no        []yes:           SUBJECTIVE:     Pt reports she saw the doctor and he is pleased. She will see him in 2 weeks. Pain:  0-2/10    OBJECTIVE: See below     Observation: slightly increased tightness in quad today that dissipated with STM. Good patellar mobility slight decrease superiorly. Medial HS and ADD tight and TTP, TPs present.     Test measurements: see below    PT Practice Pattern:H  Co morbidities:1-2  SURgical R TKR 20  INITIAL VISIT 20 CURRENT VISIT 2/15/21   PAIN 0-10/10   2/10   FUNCTIONAL SCALE LEFS () 84%  (36/) 55%   PROM KE -7 -12 -4 deg    KF 43 55 101                               STRENGHT (MMT) Quad tone  trace Fair                                              RESTRICTIONS/PRECAUTIONS: OA; osteopenia Exercises/Interventions:   HEP instruction was provided and handouts given to include:  Access Code: [email or web address]   Patient Portal: [email or web address]      Therapeutic Ex (82259) Reps/Sets/sec Notes/CUES   april stretch KF/KE stretch 10x10\" ea    Recumbent bike 5' for ROM    Incline stretch 5x30\"    EZ stretch EXT/FLX 5'                    Manual Intervention (82932)     Patellar mobs GIII, IASTM quad ITB HS, ant/post tib mobs GIII, PROM EXT, manual HS stretching  x20' prone                     NMR re-education (90600)  CUES NEEDED      Reformer walking and squat to full KE 1R1B 20x ea               Therapeutic Activity (93306)                                         Therapeutic Exercise and NMR EXR  [x] (89330) Provided verbal/tactile cueing for activities related to strengthening, flexibility, endurance, ROM for improvements in LE, proximal hip, and core control with self care, mobility, lifting, ambulation.  [] (44157) Provided verbal/tactile cueing for activities related to improving balance, coordination, kinesthetic sense, posture, motor skill, proprioception  to assist with LE, proximal hip, and core control in self care, mobility, lifting, ambulation and eccentric single leg control.      NMR and Therapeutic Activities:    [x] (13015 or 71791) Provided verbal/tactile cueing for activities related to improving balance, coordination, kinesthetic sense, posture, motor skill, proprioception and motor activation to allow for proper function of core, proximal hip and LE with self care and ADLs  [] (27485) Gait Re-education- Provided training and instruction to the patient for proper LE, core and proximal hip recruitment and positioning and eccentric body weight control with ambulation re-education including up and down stairs     Home Exercise Program:    [x] (91451) Reviewed/Progressed HEP activities related to strengthening, flexibility, endurance, ROM of core, proximal hip and LE for functional self-care, mobility, lifting and ambulation/stair navigation   [] (15835)Reviewed/Progressed HEP activities related to improving balance, coordination, kinesthetic sense, posture, motor skill, proprioception of core, proximal hip and LE for self care, mobility, lifting, and ambulation/stair navigation      Manual Treatments:  PROM / STM / Oscillations-Mobs:  G-I, II, III, IV (PA's, Inf., Post.)  [x] (56742) Provided manual therapy to mobilize LE, proximal hip and/or LS spine soft tissue/joints for the purpose of modulating pain, promoting relaxation,  increasing ROM, reducing/eliminating soft tissue swelling/inflammation/restriction, improving soft tissue extensibility and allowing for proper ROM for normal function with self care, mobility, lifting and ambulation. Modalities:  [] GAME READY (VASO)- for significant edema, swelling, pain control. x10' VASO with R heel prop 5'  Charges:  Timed Code Treatment Minutes: 50   Total Treatment Minutes: 60         [] EVAL (LOW) 59735   [] EVAL (MOD) 39367  [] EVAL (HIGH) 51952   [] RE-EVAL     [x] WW(99603) x 1    [] IONTO  [x] NMR (75338)  x 1   [x] VASO  [x] Manual (64352) x1    [] Other:  [] TA x      [] Mech Traction (35504)  [] ES(attended) (43938)      [] ES (un) (40360): NMES      MEDICARE CAP EXCEPTION DOCUMENTATION      I certify that this patient meets one of the below criteria necessary for becoming an exception to the Medicare cap on therapy services:    [x]  The patient has a condition identified by an ICD-10 code that has a direct and significant impact on the need for therapy. (Significantly impacts the rate of recovery.)                   [x]  The patient has a complexity identified by an ICD-10 code that has a direct and significant impact on the need for therapy.   (Significantly impacts the rate of recovery and is associated with a primary condition.)         []  The patient has associated variables that influence the amount of treatment to include:  Social support, self-efficacy/motivation, prognosis, time since onset/acuity. []  The patient has generalized musculoskeletal conditions or a condition affecting multiple sites that will have a direct impact on the rate of recovery. []  The patient had a prior episode of outpatient therapy during this calendar year for a different condition. []  The patient has a mental or cognitive disorder in addition to the condition being treated that will have a direct and significant impact on the rate of recovery. GOALS:  Patient stated goal: be able to squat; bend knee and no pain   [x] Progressing: [] Met: [] Not Met: [] Adjusted    Therapist goals for Patient:   Short Term Goals: To be achieved in: 2 weeks  1. Independent in HEP and progression per patient tolerance, in order to prevent re-injury. [] Progressing: [x] Met: [] Not Met: [] Adjusted  2. Patient will have a decrease in pain to facilitate improvement in movement, function, and ADLs as indicated by Functional Deficits. [] Progressing: [x] Met: [] Not Met: [] Adjusted    Long Term Goals: To be achieved in: 8-10 weeks  1. Disability index score of 40% or less for the LEFS to assist with reaching prior level of function. [x] Progressing: [] Met: [] Not Met: [] Adjusted  2. Patient will demonstrate increased AROM to R knee ext to -2 and flex to 110  to allow for proper joint functioning as indicated by patients Functional Deficits. [x] Progressing: [x] Met:50%  [] Not Met: [] Adjusted  3. Patient will demonstrate an increase in Strength to good proximal hip strength and control, within 5lb HHD in LE to allow for proper functional mobility as indicated by patients Functional Deficits. [x] Progressing: [] Met: [] Not Met: [] Adjusted  4. Patient will return to being able to ambulate with proper gait without an AD; ascend and descend stairs to basement with reciprocal gait  without increased symptoms or restriction.    [x] Progressing: [] Met: [] Not Met: [] Adjusted      Overall Progression Towards Functional goals/ Treatment Progress Update:  [] Patient is progressing as expected towards functional goals listed. [x] Progression is slowed due to complexities/Impairments listed. [] Progression has been slowed due to co-morbidities. [] Plan just implemented, too soon to assess goals progression <30days   [] Goals require adjustment due to lack of progress  [] Patient is not progressing as expected and requires additional follow up with physician  [] Other :     Prognosis for POC: [x] Good [x] Fair  [] Poor      Patient requires continued skilled intervention: [x] Yes  [] No    Treatment/Activity Tolerance:  [x] Patient able to complete treatment  [] Patient limited by fatigue  [] Patient limited by pain    [] Patient limited by other medical complications  [] Other:     ASSESSMENT:  P Patient is currently scheduled daily for this week and next and appears to be tolerating treatment sessions well. We will continue to assess her need and tolerance for PT. Pt will benefit from continued skilled intervention for ROM, strength and return to function and continues to necessitate skilled care. Jael Kent PLAN: Cont R knee ROM and strength and normalization of gait mechanics. Cont to see daily for 1 more week at least then 3x weekly if pt tolerates well. [x] Continue per plan of care [] Alter current plan (see comments above)  [] Plan of care initiated [] Hold pending MD visit [] Discharge      Electronically signed by:  Kahty Terrazas PT , [id number]    Note: If patient does not return for scheduled/ recommended follow up visits, this note will serve as a discharge from care along with most recent update on progress.

## 2021-02-18 ENCOUNTER — HOSPITAL ENCOUNTER (OUTPATIENT)
Dept: PHYSICAL THERAPY | Age: 71
Setting detail: THERAPIES SERIES
Discharge: HOME | End: 2021-02-18
Payer: MEDICARE

## 2021-02-18 PROCEDURE — 97110 THERAPEUTIC EXERCISES: CPT

## 2021-02-18 PROCEDURE — 97112 NEUROMUSCULAR REEDUCATION: CPT

## 2021-02-18 PROCEDURE — 97016 VASOPNEUMATIC DEVICE THERAPY: CPT

## 2021-02-18 PROCEDURE — G0283 ELEC STIM OTHER THAN WOUND: HCPCS

## 2021-02-18 PROCEDURE — 97140 MANUAL THERAPY 1/> REGIONS: CPT

## 2021-02-18 NOTE — FLOWSHEET NOTE
[facility],  [address]  Phone: [phone]  Fax [phone]        Physical Therapy Treatment Note/ Progress Report:           Date:  2021    Patient Name:  Finn Duval    :  1950  MRN: [id number]  Restrictions/Precautions:    Medical/Treatment Diagnosis Information:  Diagnosis: s/p r TKR sx date 2020 M25.561; M17.11 MINH 20  Treatment Diagnosis: R knee pain J37.885  Insurance/Certification information:  PT Insurance Information: /humana  Physician Information:  Referring Practitioner: Dr. Juani Velazquez  Has the plan of care been signed (Y/N):        []  Yes  [x]  No     Date of Patient follow up with Physician:     Is this a Progress Report:     []  Yes  [x]  No        If Yes:  Date Range for reporting period:  Beginning 2021  Ending :21    Progress report will be due (10 Rx or 30 days whichever is less): 2021 (done 21 visit 20) Due 3/11/21 visit 30      Recertification will be due (POC Duration  / 90 days whichever is less): 2021       Visit # Date Range Insurance Allowable Requires auth   24 2020- BMN    [x]no        []yes:           SUBJECTIVE:     Pt notes no issues since her last appt. Feels the knee continues to improve. Pain:  0-2/10    OBJECTIVE: See below     Observation: slightly increased tightness in quad today that dissipated with STM. Good patellar mobility slight decrease superiorly. Medial HS and ADD tight and TTP, TPs present.     Test measurements: see below    PT Practice Pattern:H  Co morbidities:1-2  SURgical R TKR 20  INITIAL VISIT 20 CURRENT VISIT 21   PAIN 0-10/10   2/10   FUNCTIONAL SCALE LEFS () 84%  Not tested this date (prior () 55%)   PROM KE -7 -12 -6 deg    KF 43 55 102                               STRENGHT (MMT) Quad tone  trace Fair RESTRICTIONS/PRECAUTIONS: OA; osteopenia     Exercises/Interventions:   HEP instruction was provided and handouts given to include:  Access Code: [email or web address]   Patient Portal: [email or web address]/      Therapeutic Ex (82434) Reps/Sets/sec Notes/CUES   april stretch KF/KE stretch 10x10\" ea    Recumbent bike 8' for ROM    Incline stretch 5x30\"     5'     Seated combined HS+ gastroc stretch 30\"x3    Reformer press outs 2x20  2 red, 1 blue spring        Manual Intervention (19703)     Patellar mobs GIII, IASTM quad ITB HS, ant/post tib mobs GIII, PROM EXT, manual HS stretching  x25' prone   Supine SLR MWM for KE     Seated MWM for KF EOP               NMR re-education (07715)  CUES NEEDED      Biphasic NMES     HS activation contract relax and seated SAQ quad activation contract relax; SLR with manual MWM and NMES13'10\"/10\"  2.0 ramp            Reformer KF stretch 30\"x5 3 red springs, 1 blue         1R1B 20x ea          Step ups L on 4\" 2x15 Focus on TKE, eccentric control; used TB to facilitate TKE    Therapeutic Activity (27123)                                         Therapeutic Exercise and NMR EXR  [x] (36863) Provided verbal/tactile cueing for activities related to strengthening, flexibility, endurance, ROM for improvements in LE, proximal hip, and core control with self care, mobility, lifting, ambulation.  [] (17935) Provided verbal/tactile cueing for activities related to improving balance, coordination, kinesthetic sense, posture, motor skill, proprioception  to assist with LE, proximal hip, and core control in self care, mobility, lifting, ambulation and eccentric single leg control.      NMR and Therapeutic Activities:    [x] (23612 or 99730) Provided verbal/tactile cueing for activities related to improving balance, coordination, kinesthetic sense, posture, motor skill, proprioception and motor activation to allow for proper function of core, proximal hip and LE with self care and ADLs  [] (52566) Gait Re-education- Provided training and instruction to the patient for proper LE, core and proximal hip recruitment and positioning and eccentric body weight control with ambulation re-education including up and down stairs     Home Exercise Program:    [x] (89171) Reviewed/Progressed HEP activities related to strengthening, flexibility, endurance, ROM of core, proximal hip and LE for functional self-care, mobility, lifting and ambulation/stair navigation   [] (08395)Reviewed/Progressed HEP activities related to improving balance, coordination, kinesthetic sense, posture, motor skill, proprioception of core, proximal hip and LE for self care, mobility, lifting, and ambulation/stair navigation      Manual Treatments:  PROM / STM / Oscillations-Mobs:  G-I, II, III, IV (PA's, Inf., Post.)  [x] (27977) Provided manual therapy to mobilize LE, proximal hip and/or LS spine soft tissue/joints for the purpose of modulating pain, promoting relaxation,  increasing ROM, reducing/eliminating soft tissue swelling/inflammation/restriction, improving soft tissue extensibility and allowing for proper ROM for normal function with self care, mobility, lifting and ambulation. Modalities:  [x] GAME READY (VASO)- for significant edema, swelling, pain control. x10' VASO with R heel prop    Charges:  Timed Code Treatment Minutes: 61'   Total Treatment Minutes: 79'         [] EVAL (LOW) 455 1011   [] EVAL (MOD) 51067  [] EVAL (HIGH) 85688   [] RE-EVAL     [x] VD(40889) x 1    [] IONTO  [x] NMR (92120)  x 1   [x] VASO  [x] Manual (17342) x2    [] Other:  [] TA x      [] Mech Traction (13490)  [] ES(attended) (72947)      [x] ES (un) (49279):  NMES      MEDICARE CAP EXCEPTION DOCUMENTATION      I certify that this patient meets one of the below criteria necessary for becoming an exception to the Medicare cap on therapy services:    [x]  The patient has a condition identified by an ICD-10 code that has a direct and significant impact on the need for therapy. (Significantly impacts the rate of recovery.)                   [x]  The patient has a complexity identified by an ICD-10 code that has a direct and significant impact on the need for therapy. (Significantly impacts the rate of recovery and is associated with a primary condition.)         []  The patient has associated variables that influence the amount of treatment to include:  Social support, self-efficacy/motivation, prognosis, time since onset/acuity. []  The patient has generalized musculoskeletal conditions or a condition affecting multiple sites that will have a direct impact on the rate of recovery. []  The patient had a prior episode of outpatient therapy during this calendar year for a different condition. []  The patient has a mental or cognitive disorder in addition to the condition being treated that will have a direct and significant impact on the rate of recovery. GOALS:  Patient stated goal: be able to squat; bend knee and no pain   [x] Progressing: [] Met: [] Not Met: [] Adjusted    Therapist goals for Patient:   Short Term Goals: To be achieved in: 2 weeks  1. Independent in HEP and progression per patient tolerance, in order to prevent re-injury. [] Progressing: [x] Met: [] Not Met: [] Adjusted  2. Patient will have a decrease in pain to facilitate improvement in movement, function, and ADLs as indicated by Functional Deficits. [] Progressing: [x] Met: [] Not Met: [] Adjusted    Long Term Goals: To be achieved in: 8-10 weeks  1. Disability index score of 40% or less for the LEFS to assist with reaching prior level of function. [x] Progressing: [] Met: [] Not Met: [] Adjusted  2. Patient will demonstrate increased AROM to R knee ext to -2 and flex to 110  to allow for proper joint functioning as indicated by patients Functional Deficits. [x] Progressing: [x] Met:50%  [] Not Met: [] Adjusted  3.  Patient will demonstrate an increase in Strength to good proximal hip strength and control, within 5lb HHD in LE to allow for proper functional mobility as indicated by patients Functional Deficits. [x] Progressing: [] Met: [] Not Met: [] Adjusted  4. Patient will return to being able to ambulate with proper gait without an AD; ascend and descend stairs to basement with reciprocal gait  without increased symptoms or restriction. [x] Progressing: [] Met: [] Not Met: [] Adjusted      Overall Progression Towards Functional goals/ Treatment Progress Update:  [] Patient is progressing as expected towards functional goals listed. [x] Progression is slowed due to complexities/Impairments listed. [] Progression has been slowed due to co-morbidities. [] Plan just implemented, too soon to assess goals progression <30days   [] Goals require adjustment due to lack of progress  [] Patient is not progressing as expected and requires additional follow up with physician  [] Other :     Prognosis for POC: [x] Good [x] Fair  [] Poor      Patient requires continued skilled intervention: [x] Yes  [] No    Treatment/Activity Tolerance:  [x] Patient able to complete treatment  [] Patient limited by fatigue  [] Patient limited by pain    [] Patient limited by other medical complications  [] Other:     ASSESSMENT:  Patient did well this session and was able to achieve -10 deg of KE actively following MT and stretching, with PT OP she achieved - 6 deg. Patient is currently scheduled daily for this week and due to continued improvement we will schedule 3x/weekly as pt is demonstrating good improvement with stress that she continue frequent performance of her HEP We will continue to assess her need and tolerance for PT. Pt will benefit from continued skilled intervention for ROM, strength and return to function and continues to necessitate skilled care. Viraj Eugene PLAN: Cont R knee ROM and strength and normalization of gait mechanics.   Cont to see daily for 1 more week at least then 3x weekly if pt tolerates well. [x] Continue per plan of care [] Alter current plan (see comments above)  [] Plan of care initiated [] Hold pending MD visit [] Discharge      Electronically signed by:  Denise Aguirre, PT , DPT, OCS [id number]     Note: If patient does not return for scheduled/ recommended follow up visits, this note will serve as a discharge from care along with most recent update on progress.

## 2021-02-19 ENCOUNTER — HOSPITAL ENCOUNTER (OUTPATIENT)
Dept: PHYSICAL THERAPY | Age: 71
Setting detail: THERAPIES SERIES
Discharge: HOME | End: 2021-02-19
Payer: MEDICARE

## 2021-02-19 PROCEDURE — 97112 NEUROMUSCULAR REEDUCATION: CPT

## 2021-02-19 PROCEDURE — 97110 THERAPEUTIC EXERCISES: CPT

## 2021-02-19 PROCEDURE — G0283 ELEC STIM OTHER THAN WOUND: HCPCS

## 2021-02-19 PROCEDURE — 97016 VASOPNEUMATIC DEVICE THERAPY: CPT

## 2021-02-19 PROCEDURE — 97140 MANUAL THERAPY 1/> REGIONS: CPT

## 2021-02-19 NOTE — FLOWSHEET NOTE
[facility],  [address] [place]  Phone: [phone]  Fax [phone]        Physical Therapy Treatment Note/ Progress Report:           Date:  2021    Patient Name:  Ananya Blake    :  1950  MRN: [id number]  Restrictions/Precautions:    Medical/Treatment Diagnosis Information:  Diagnosis: s/p r TKR sx date 2020 M25.561; M17.11 MINH 20  Treatment Diagnosis: R knee pain X97.045  Insurance/Certification information:  PT Insurance Information: /humana  Physician Information:  Referring Practitioner: Dr. Lindy Garcia  Has the plan of care been signed (Y/N):        []  Yes  [x]  No     Date of Patient follow up with Physician:     Is this a Progress Report:     []  Yes  [x]  No        If Yes:  Date Range for reporting period:  Beginning 2021  Ending :21    Progress report will be due (10 Rx or 30 days whichever is less): 2021 (done 21 visit 20) Due 3/11/21 visit 30      Recertification will be due (POC Duration  / 90 days whichever is less): 2021       Visit # Date Range Insurance Allowable Requires auth   25 2020- BMN    [x]no        []yes:           SUBJECTIVE:     Pt brings her cane with her today as she has been using it at  Home occasionally and would like to practice using it.   Pain:  0/10    OBJECTIVE: See below     Observation: ambulation with SPC: improved upright posturing, slower mil vs RW, improving heel strike and KE in stance phase on R.    Test measurements: see below    PT Practice Pattern:H  Co morbidities:1-2  SURgical R TKR 20  INITIAL VISIT 20 CURRENT VISIT 21   PAIN 0-10/10   2/10   FUNCTIONAL SCALE LEFS () 84%  Not tested this date (prior () 55%)   PROM KE -7 -12 -6 deg    KF 43 55 102                               STRENGHT (MMT) Quad tone  trace Fair RESTRICTIONS/PRECAUTIONS: OA; osteopenia     Exercises/Interventions:   HEP instruction was provided and handouts given to include:  Access Code: [email or web address]   Patient Portal: [email or web address]      Therapeutic Ex (24159) Reps/Sets/sec Notes/CUES   april stretch KF/KE stretch 30\"x5 ea    Recumbent bike 5' for ROM    Incline stretch 5x30\"     5'     30\"x3    2x20  2 red, 1 blue spring        Manual Intervention (09577)     Patellar mobs GIII, IASTM quad ITB HS, ant/post tib mobs GIII, PROM EXT, manual HS stretching  x15' prone                     NMR re-education (52618)  CUES NEEDED      Biphasic NMES    HS activation contract relax and seated SAQ quad activation contract relax; 10'10\"/10\"  2.0 ramp            Reformer KF stretch 30\"x5 3 red springs, 1 blue         1R1B 20x ea       Gait retraining with SPC 5' Improved upright posturing, slower mil than with walker    2x15 Focus on TKE, eccentric control; used TB to facilitate TKE    Therapeutic Activity (24577)                                         Therapeutic Exercise and NMR EXR  [x] (75242) Provided verbal/tactile cueing for activities related to strengthening, flexibility, endurance, ROM for improvements in LE, proximal hip, and core control with self care, mobility, lifting, ambulation.  [] (74623) Provided verbal/tactile cueing for activities related to improving balance, coordination, kinesthetic sense, posture, motor skill, proprioception  to assist with LE, proximal hip, and core control in self care, mobility, lifting, ambulation and eccentric single leg control.      NMR and Therapeutic Activities:    [x] (27607 or 83954) Provided verbal/tactile cueing for activities related to improving balance, coordination, kinesthetic sense, posture, motor skill, proprioception and motor activation to allow for proper function of core, proximal hip and LE with self care and ADLs  [] (00767) Gait Re-education- Provided training and instruction to the patient for proper LE, core and proximal hip recruitment and positioning and eccentric body weight control with ambulation re-education including up and down stairs     Home Exercise Program:    [x] (27963) Reviewed/Progressed HEP activities related to strengthening, flexibility, endurance, ROM of core, proximal hip and LE for functional self-care, mobility, lifting and ambulation/stair navigation   [] (72790)Reviewed/Progressed HEP activities related to improving balance, coordination, kinesthetic sense, posture, motor skill, proprioception of core, proximal hip and LE for self care, mobility, lifting, and ambulation/stair navigation      Manual Treatments:  PROM / STM / Oscillations-Mobs:  G-I, II, III, IV (PA's, Inf., Post.)  [x] (62386) Provided manual therapy to mobilize LE, proximal hip and/or LS spine soft tissue/joints for the purpose of modulating pain, promoting relaxation,  increasing ROM, reducing/eliminating soft tissue swelling/inflammation/restriction, improving soft tissue extensibility and allowing for proper ROM for normal function with self care, mobility, lifting and ambulation. Modalities:  [x] GAME READY (VASO)- for significant edema, swelling, pain control. x10' VASO with R heel prop    Charges:  Timed Code Treatment Minutes: 39'   Total Treatment Minutes: 54'         [] EVAL (LOW) 455 1011   [] EVAL (MOD) 61349  [] EVAL (HIGH) 28584   [] RE-EVAL     [x] Sinhala(65484) x 1    [] IONTO  [x] NMR (94165)  x 1   [x] VASO  [x] Manual (52970) x1    [] Other:  [] TA x      [] Mech Traction (99672)  [] ES(attended) (71676)      [x] ES (un) (83438): NMES      MEDICARE CAP EXCEPTION DOCUMENTATION      I certify that this patient meets one of the below criteria necessary for becoming an exception to the Medicare cap on therapy services:    [x]  The patient has a condition identified by an ICD-10 code that has a direct and significant impact on the need for therapy.  (Significantly impacts the rate of recovery.)                   [x]  The patient has a complexity identified by an ICD-10 code that has a direct and significant impact on the need for therapy. (Significantly impacts the rate of recovery and is associated with a primary condition.)         []  The patient has associated variables that influence the amount of treatment to include:  Social support, self-efficacy/motivation, prognosis, time since onset/acuity. []  The patient has generalized musculoskeletal conditions or a condition affecting multiple sites that will have a direct impact on the rate of recovery. []  The patient had a prior episode of outpatient therapy during this calendar year for a different condition. []  The patient has a mental or cognitive disorder in addition to the condition being treated that will have a direct and significant impact on the rate of recovery. GOALS:  Patient stated goal: be able to squat; bend knee and no pain   [x] Progressing: [] Met: [] Not Met: [] Adjusted    Therapist goals for Patient:   Short Term Goals: To be achieved in: 2 weeks  1. Independent in HEP and progression per patient tolerance, in order to prevent re-injury. [] Progressing: [x] Met: [] Not Met: [] Adjusted  2. Patient will have a decrease in pain to facilitate improvement in movement, function, and ADLs as indicated by Functional Deficits. [] Progressing: [x] Met: [] Not Met: [] Adjusted    Long Term Goals: To be achieved in: 8-10 weeks  1. Disability index score of 40% or less for the LEFS to assist with reaching prior level of function. [x] Progressing: [] Met: [] Not Met: [] Adjusted  2. Patient will demonstrate increased AROM to R knee ext to -2 and flex to 110  to allow for proper joint functioning as indicated by patients Functional Deficits. [x] Progressing: [x] Met:50%  [] Not Met: [] Adjusted  3.  Patient will demonstrate an increase in Strength to good proximal hip strength and control, within 5lb HHD in LE to allow for proper functional mobility as indicated by patients Functional Deficits. [x] Progressing: [] Met: [] Not Met: [] Adjusted  4. Patient will return to being able to ambulate with proper gait without an AD; ascend and descend stairs to basement with reciprocal gait  without increased symptoms or restriction. [x] Progressing: [] Met: [] Not Met: [] Adjusted      Overall Progression Towards Functional goals/ Treatment Progress Update:  [] Patient is progressing as expected towards functional goals listed. [x] Progression is slowed due to complexities/Impairments listed. [] Progression has been slowed due to co-morbidities. [] Plan just implemented, too soon to assess goals progression <30days   [] Goals require adjustment due to lack of progress  [] Patient is not progressing as expected and requires additional follow up with physician  [] Other :     Prognosis for POC: [x] Good [x] Fair  [] Poor      Patient requires continued skilled intervention: [x] Yes  [] No    Treatment/Activity Tolerance:  [x] Patient able to complete treatment  [] Patient limited by fatigue  [] Patient limited by pain    [] Patient limited by other medical complications  [] Other:     ASSESSMENT:  Patient did well this session and was educated on ambulation with SPC. She demonstrates safe use and improved gait mechanics with SPC but was educated on using RW still in unfamiliar environments and in fare weather. Patient is currently scheduled daily for this week and due to continued improvement we will schedule 3x/weekly as pt is demonstrating good improvement with stress that she continue frequent performance of her HEP We will continue to assess her need and tolerance for PT. Pt will benefit from continued skilled intervention for ROM, strength and return to function and continues to necessitate skilled care. Delsa Severance PLAN: Cont R knee ROM and strength and normalization of gait mechanics.   Cont to see daily for 1 more week at least then 3x weekly if pt tolerates well. [x] Continue per plan of care [] Alter current plan (see comments above)  [] Plan of care initiated [] Hold pending MD visit [] Discharge      Electronically signed by:  Nicolas Carroll, PT , DPT, [facility] [id number]     Note: If patient does not return for scheduled/ recommended follow up visits, this note will serve as a discharge from care along with most recent update on progress.

## 2021-02-22 ENCOUNTER — HOSPITAL ENCOUNTER (OUTPATIENT)
Dept: PHYSICAL THERAPY | Age: 71
Setting detail: THERAPIES SERIES
Discharge: HOME | End: 2021-02-22
Payer: MEDICARE

## 2021-02-22 PROCEDURE — 97016 VASOPNEUMATIC DEVICE THERAPY: CPT | Performed by: PHYSICAL THERAPIST

## 2021-02-22 PROCEDURE — 97110 THERAPEUTIC EXERCISES: CPT | Performed by: PHYSICAL THERAPIST

## 2021-02-22 PROCEDURE — 97140 MANUAL THERAPY 1/> REGIONS: CPT | Performed by: PHYSICAL THERAPIST

## 2021-02-22 PROCEDURE — 97112 NEUROMUSCULAR REEDUCATION: CPT | Performed by: PHYSICAL THERAPIST

## 2021-02-22 NOTE — FLOWSHEET NOTE
[facility], [address] [place] [address]  Phone: [phone]  Fax [phone]        Physical Therapy Treatment Note/ Progress Report:           Date:  2021    Patient Name:  Ladonna Borja    :  1950  MRN: [id number]  Restrictions/Precautions:    Medical/Treatment Diagnosis Information:  Diagnosis: s/p r TKR sx date 2020 M25.561; M17.11 MINH 20  Treatment Diagnosis: R knee pain Y95.308  Insurance/Certification information:  PT Insurance Information: /[employer]  Physician Information:  Referring Practitioner: Dr. Supriya Gallagher  Has the plan of care been signed (Y/N):        []  Yes  [x]  No     Date of Patient follow up with Physician:     Is this a Progress Report:     []  Yes  [x]  No        If Yes:  Date Range for reporting period:  Beginning 2021  Ending :21    Progress report will be due (10 Rx or 30 days whichever is less): 2021 (done 21 visit 20) Due 3/11/21 visit 30      Recertification will be due (POC Duration  / 90 days whichever is less): 2021       Visit # Date Range Insurance Allowable Requires auth   26 2020- BMN    [x]no        []yes:           SUBJECTIVE:     Pt states she feels like she is making progress. Pain:  0/10    OBJECTIVE: See below     Observation: ambulation with SPC: improved upright posturing, slower mil vs RW, improving heel strike and KE in stance phase on R. Pt arrives confident with SPC.     Test measurements: see below    PT Practice Pattern:H  Co morbidities:1-2  SURgical R TKR 20  INITIAL VISIT 20 CURRENT VISIT 21   PAIN 0-10/10   2/10   FUNCTIONAL SCALE LEFS () 84%  Not tested this date (prior () 55%)   PROM KE -7 -12 -6 deg    KF 43 55 102                               STRENGHT (MMT) Quad tone  trace Fair                                              RESTRICTIONS/PRECAUTIONS: OA; osteopenia Exercises/Interventions:   HEP instruction was provided and handouts given to include:  Access Code: [email or web address]   Patient Portal: [email or web address]/      Therapeutic Ex (82035) Reps/Sets/sec Notes/CUES   april stretch KF/KE stretch 1'x4 ea    Recumbent bike 6' for ROM    Incline stretch 5x30\"     5'     30\"x3    2x20  2 red, 1 blue spring        Manual Intervention (24315)     Patellar mobs GIII, IASTM quad ITB HS, ant/post tib mobs GIII, PROM EXT, manual HS stretching  x13' prone                     NMR re-education (06867)  CUES NEEDED      Biphasic NMES    HS activation contract relax and seated SAQ quad activation contract relax; 10'10\"/10\"  2.0 ramp            3 red springs, 1 blue         1R1B 20x ea       Gait retraining with SPC 5' Improved upright posturing, slower mil than with walker    2x15 Focus on TKE, eccentric control; used TB to facilitate TKE    Therapeutic Activity (65992)                                         Therapeutic Exercise and NMR EXR  [x] (10327) Provided verbal/tactile cueing for activities related to strengthening, flexibility, endurance, ROM for improvements in LE, proximal hip, and core control with self care, mobility, lifting, ambulation.  [] (36769) Provided verbal/tactile cueing for activities related to improving balance, coordination, kinesthetic sense, posture, motor skill, proprioception  to assist with LE, proximal hip, and core control in self care, mobility, lifting, ambulation and eccentric single leg control.      NMR and Therapeutic Activities:    [x] (22168 or 27703) Provided verbal/tactile cueing for activities related to improving balance, coordination, kinesthetic sense, posture, motor skill, proprioception and motor activation to allow for proper function of core, proximal hip and LE with self care and ADLs  [] (65428) Gait Re-education- Provided training and instruction to the patient for proper LE, core and proximal hip recruitment and positioning and ICD-10 code that has a direct and significant impact on the need for therapy. (Significantly impacts the rate of recovery and is associated with a primary condition.)         []  The patient has associated variables that influence the amount of treatment to include:  Social support, self-efficacy/motivation, prognosis, time since onset/acuity. []  The patient has generalized musculoskeletal conditions or a condition affecting multiple sites that will have a direct impact on the rate of recovery. []  The patient had a prior episode of outpatient therapy during this calendar year for a different condition. []  The patient has a mental or cognitive disorder in addition to the condition being treated that will have a direct and significant impact on the rate of recovery. GOALS:  Patient stated goal: be able to squat; bend knee and no pain   [x] Progressing: [] Met: [] Not Met: [] Adjusted    Therapist goals for Patient:   Short Term Goals: To be achieved in: 2 weeks  1. Independent in HEP and progression per patient tolerance, in order to prevent re-injury. [] Progressing: [x] Met: [] Not Met: [] Adjusted  2. Patient will have a decrease in pain to facilitate improvement in movement, function, and ADLs as indicated by Functional Deficits. [] Progressing: [x] Met: [] Not Met: [] Adjusted    Long Term Goals: To be achieved in: 8-10 weeks  1. Disability index score of 40% or less for the LEFS to assist with reaching prior level of function. [x] Progressing: [] Met: [] Not Met: [] Adjusted  2. Patient will demonstrate increased AROM to R knee ext to -2 and flex to 110  to allow for proper joint functioning as indicated by patients Functional Deficits. [x] Progressing: [x] Met:50%  [] Not Met: [] Adjusted  3.  Patient will demonstrate an increase in Strength to good proximal hip strength and control, within 5lb HHD in LE to allow for proper functional mobility as indicated by patients Functional Deficits. [x] Progressing: [] Met: [] Not Met: [] Adjusted  4. Patient will return to being able to ambulate with proper gait without an AD; ascend and descend stairs to basement with reciprocal gait  without increased symptoms or restriction. [x] Progressing: [] Met: [] Not Met: [] Adjusted      Overall Progression Towards Functional goals/ Treatment Progress Update:  [] Patient is progressing as expected towards functional goals listed. [x] Progression is slowed due to complexities/Impairments listed. [] Progression has been slowed due to co-morbidities. [] Plan just implemented, too soon to assess goals progression <30days   [] Goals require adjustment due to lack of progress  [] Patient is not progressing as expected and requires additional follow up with physician  [] Other :     Prognosis for POC: [x] Good [x] Fair  [] Poor      Patient requires continued skilled intervention: [x] Yes  [] No    Treatment/Activity Tolerance:  [x] Patient able to complete treatment  [] Patient limited by fatigue  [] Patient limited by pain    [] Patient limited by other medical complications  [] Other:     ASSESSMENT:  Patient performed all challenges well this visit. She shows improvement in one area at least each visit. Pt will benefit from continued skilled intervention for ROM, strength and return to function and continues to necessitate skilled care. Maira Viramontes PLAN: Cont R knee ROM and strength and normalization of gait mechanics. Cont Pt 3x weekly for 2-4 weeks. [x] Continue per plan of care [] Alter current plan (see comments above)  [] Plan of care initiated [] Hold pending MD visit [] Discharge      Electronically signed by:  Nader Mensah, PT , [id number]    Note: If patient does not return for scheduled/ recommended follow up visits, this note will serve as a discharge from care along with most recent update on progress.

## 2021-02-23 ENCOUNTER — APPOINTMENT (OUTPATIENT)
Dept: PHYSICAL THERAPY | Age: 71
End: 2021-02-23
Payer: MEDICARE

## 2021-02-24 ENCOUNTER — HOSPITAL ENCOUNTER (OUTPATIENT)
Dept: PHYSICAL THERAPY | Age: 71
Setting detail: THERAPIES SERIES
Discharge: HOME | End: 2021-02-24
Payer: MEDICARE

## 2021-02-24 PROCEDURE — 97112 NEUROMUSCULAR REEDUCATION: CPT | Performed by: PHYSICAL THERAPIST

## 2021-02-24 PROCEDURE — G0283 ELEC STIM OTHER THAN WOUND: HCPCS | Performed by: PHYSICAL THERAPIST

## 2021-02-24 PROCEDURE — 97110 THERAPEUTIC EXERCISES: CPT | Performed by: PHYSICAL THERAPIST

## 2021-02-24 NOTE — FLOWSHEET NOTE
[facility], [address] [place]  Phone: [phone]  Fax [phone]        Physical Therapy Treatment Note/ Progress Report:           Date:  2021    Patient Name:  Ladonna Borja    :  1950  MRN: [id number]  Restrictions/Precautions:    Medical/Treatment Diagnosis Information:  Diagnosis: s/p r TKR sx date 2020 M25.561; M17.11 MINH 20  Treatment Diagnosis: R knee pain A48.628  Insurance/Certification information:  PT Insurance Information: /[employer]  Physician Information:  Referring Practitioner: Dr. Supriya Gallagher  Has the plan of care been signed (Y/N):        []  Yes  [x]  No     Date of Patient follow up with Physician:     Is this a Progress Report:     []  Yes  [x]  No        If Yes:  Date Range for reporting period:  Beginning 2021  Ending :21    Progress report will be due (10 Rx or 30 days whichever is less): 2021 (done 21 visit 20) Due 3/11/21 visit 30      Recertification will be due (POC Duration  / 90 days whichever is less): 2021       Visit # Date Range Insurance Allowable Requires auth   27 2020- BMN    [x]no        []yes:           SUBJECTIVE:     Pt states she feels like she is making progress. No new issues. She was able to run the vacuum one room at a time yesterday.     Pain:  0/10    OBJECTIVE: See below     Observation:     Test measurements:     PT Practice Pattern:H  Co morbidities:1-2  SURgical R TKR 20  INITIAL VISIT 20 CURRENT VISIT 21   PAIN 0-10/10   0/10   FUNCTIONAL SCALE LEFS () 84%  Not tested this date (prior () 55%)   PROM KE -7 -12 -4 deg    KF 43 55 100 on EZ stretch                               STRENGHT (MMT) Quad tone  trace Fair                                              RESTRICTIONS/PRECAUTIONS: OA; osteopenia     Exercises/Interventions:   HEP instruction was provided and handouts given to include:  Access Code: [email or web address]   Patient Portal: [employer]/      Therapeutic Ex (74499) Reps/Sets/sec Notes/CUES   april stretch KF/KE stretch 1'x4 ea    Recumbent bike 6' for ROM    Incline stretch 5x30\"    EZ stretch EXT/FLX 3' FLX  4' EXT    30\"x3    2x20  2 red, 1 blue spring        Manual Intervention ([id number])     prone           PROM EXT ROM 3'         NMR re-education (20239)  CUES NEEDED      Biphasic NMES    HS activation contract relax and seated SAQ quad activation contract relax; 10'10\"/10\"  2.0 ramp            3 red springs, 1 blue        Reformer walking and squat to full KE 2R  20x ea       Gait retraining with SPC 5' Improved upright posturing, slower mil than with walker    2x15 Focus on TKE, eccentric control; used TB to facilitate TKE    Therapeutic Activity (48127)                                         Therapeutic Exercise and NMR EXR  [x] (91119) Provided verbal/tactile cueing for activities related to strengthening, flexibility, endurance, ROM for improvements in LE, proximal hip, and core control with self care, mobility, lifting, ambulation.  [] (03306) Provided verbal/tactile cueing for activities related to improving balance, coordination, kinesthetic sense, posture, motor skill, proprioception  to assist with LE, proximal hip, and core control in self care, mobility, lifting, ambulation and eccentric single leg control.      NMR and Therapeutic Activities:    [x] (86927 or 76287) Provided verbal/tactile cueing for activities related to improving balance, coordination, kinesthetic sense, posture, motor skill, proprioception and motor activation to allow for proper function of core, proximal hip and LE with self care and ADLs  [] (10302) Gait Re-education- Provided training and instruction to the patient for proper LE, core and proximal hip recruitment and positioning and eccentric body weight control with ambulation re-education including up and down stairs     Home Exercise Program:    [x] (32415) Reviewed/Progressed HEP activities related to strengthening, flexibility, endurance, ROM of core, proximal hip and LE for functional self-care, mobility, lifting and ambulation/stair navigation   [] (22688)Reviewed/Progressed HEP activities related to improving balance, coordination, kinesthetic sense, posture, motor skill, proprioception of core, proximal hip and LE for self care, mobility, lifting, and ambulation/stair navigation      Manual Treatments:  PROM / STM / Oscillations-Mobs:  G-I, II, III, IV (PA's, Inf., Post.)  [] (13946) Provided manual therapy to mobilize LE, proximal hip and/or LS spine soft tissue/joints for the purpose of modulating pain, promoting relaxation,  increasing ROM, reducing/eliminating soft tissue swelling/inflammation/restriction, improving soft tissue extensibility and allowing for proper ROM for normal function with self care, mobility, lifting and ambulation. Modalities:  [] GAME READY (VASO)- for significant edema, swelling, pain control. Declined      Charges:  Timed Code Treatment Minutes: 48'   Total Treatment Minutes: 48'         [] EVAL (LOW) 84977   [] EVAL (MOD) 91523  [] EVAL (HIGH) 95508   [] RE-EVAL     [x] UE(66501) x2    [] IONTO  [x] NMR (76011)  x1    [x] VASO  [] Manual (14696) x    [] Other:  [] TA x      [] Mech Traction (08690)  [] ES(attended) (47187)      [x] ES (un) (05076): NMES      MEDICARE CAP EXCEPTION DOCUMENTATION      I certify that this patient meets one of the below criteria necessary for becoming an exception to the Medicare cap on therapy services:    [x]  The patient has a condition identified by an ICD-10 code that has a direct and significant impact on the need for therapy. (Significantly impacts the rate of recovery.)                   [x]  The patient has a complexity identified by an ICD-10 code that has a direct and significant impact on the need for therapy.   (Significantly impacts the rate of recovery and is associated with a primary condition.)         []  The patient has associated variables that influence the amount of treatment to include:  Social support, self-efficacy/motivation, prognosis, time since onset/acuity. []  The patient has generalized musculoskeletal conditions or a condition affecting multiple sites that will have a direct impact on the rate of recovery. []  The patient had a prior episode of outpatient therapy during this calendar year for a different condition. []  The patient has a mental or cognitive disorder in addition to the condition being treated that will have a direct and significant impact on the rate of recovery. GOALS:  Patient stated goal: be able to squat; bend knee and no pain   [x] Progressing: [] Met: [] Not Met: [] Adjusted    Therapist goals for Patient:   Short Term Goals: To be achieved in: 2 weeks  1. Independent in HEP and progression per patient tolerance, in order to prevent re-injury. [] Progressing: [x] Met: [] Not Met: [] Adjusted  2. Patient will have a decrease in pain to facilitate improvement in movement, function, and ADLs as indicated by Functional Deficits. [] Progressing: [x] Met: [] Not Met: [] Adjusted    Long Term Goals: To be achieved in: 8-10 weeks  1. Disability index score of 40% or less for the LEFS to assist with reaching prior level of function. [x] Progressing: [] Met: [] Not Met: [] Adjusted  2. Patient will demonstrate increased AROM to R knee ext to -2 and flex to 110  to allow for proper joint functioning as indicated by patients Functional Deficits. [x] Progressing: [x] Met:50%  [] Not Met: [] Adjusted  3. Patient will demonstrate an increase in Strength to good proximal hip strength and control, within 5lb HHD in LE to allow for proper functional mobility as indicated by patients Functional Deficits. [x] Progressing: [] Met: [] Not Met: [] Adjusted  4.  Patient will return to being able to ambulate with proper gait without an AD; ascend and descend stairs to basement with reciprocal gait  without increased symptoms or restriction. [x] Progressing: [] Met: [] Not Met: [] Adjusted      Overall Progression Towards Functional goals/ Treatment Progress Update:  [] Patient is progressing as expected towards functional goals listed. [x] Progression is slowed due to complexities/Impairments listed. [] Progression has been slowed due to co-morbidities. [] Plan just implemented, too soon to assess goals progression <30days   [] Goals require adjustment due to lack of progress  [] Patient is not progressing as expected and requires additional follow up with physician  [] Other :     Prognosis for POC: [x] Good [x] Fair  [] Poor      Patient requires continued skilled intervention: [x] Yes  [] No    Treatment/Activity Tolerance:  [x] Patient able to complete treatment  [] Patient limited by fatigue  [] Patient limited by pain    [] Patient limited by other medical complications  [] Other:     ASSESSMENT:  Patient performed all challenges well this visit. She shows improvement in one area at least each visit. Pt shows improved ROM and ease fo EXT ROM this visit. Pt will benefit from continued skilled intervention for ROM, strength and return to function and continues to necessitate skilled care. Anthony Escobar PLAN: Cont R knee ROM and strength and normalization of gait mechanics. Cont Pt 3x weekly for 2-4 weeks. [x] Continue per plan of care [] Alter current plan (see comments above)  [] Plan of care initiated [] Hold pending MD visit [] Discharge      Electronically signed by:  Corby Oliver, PT , [id number]    Note: If patient does not return for scheduled/ recommended follow up visits, this note will serve as a discharge from care along with most recent update on progress.

## 2021-02-25 ENCOUNTER — APPOINTMENT (OUTPATIENT)
Dept: PHYSICAL THERAPY | Age: 71
End: 2021-02-25
Payer: MEDICARE

## 2021-02-26 ENCOUNTER — HOSPITAL ENCOUNTER (OUTPATIENT)
Dept: PHYSICAL THERAPY | Age: 71
Setting detail: THERAPIES SERIES
Discharge: HOME | End: 2021-02-26
Payer: MEDICARE

## 2021-02-26 PROCEDURE — 97112 NEUROMUSCULAR REEDUCATION: CPT

## 2021-02-26 PROCEDURE — G0283 ELEC STIM OTHER THAN WOUND: HCPCS

## 2021-02-26 PROCEDURE — 97140 MANUAL THERAPY 1/> REGIONS: CPT

## 2021-02-26 PROCEDURE — 97110 THERAPEUTIC EXERCISES: CPT

## 2021-02-26 NOTE — FLOWSHEET NOTE
[facility], [address] [place]  Phone: [phone]  Fax [phone]        Physical Therapy Treatment Note/ Progress Report:           Date:  2021    Patient Name:  Eliu Taveras    :  1950  MRN: [id number]  Restrictions/Precautions:    Medical/Treatment Diagnosis Information:  Diagnosis: s/p r TKR sx date 2020 M25.561; M17.11 MINH 20  Treatment Diagnosis: R knee pain V57.821  Insurance/Certification information:  PT Insurance Information: /humana  Physician Information:  Referring Practitioner: Dr. Bowen Gan  Has the plan of care been signed (Y/N):        []  Yes  [x]  No     Date of Patient follow up with Physician:     Is this a Progress Report:     []  Yes  [x]  No        If Yes:  Date Range for reporting period:  Beginning 2021  Ending :21    Progress report will be due (10 Rx or 30 days whichever is less): 2021 (done 21 visit 20) Due 3/11/21 visit 30      Recertification will be due (POC Duration  / 90 days whichever is less): 2021       Visit # Date Range Insurance Allowable Requires auth   28 2020- BMN    [x]no        []yes:           SUBJECTIVE:     Pt states she feels like she is making progress. She has her next MD follow up on Monday morning before her PT appt.     Pain:  0/10    OBJECTIVE: See below     Observation:  Improved gait with greater KE in stance on R, use of SPC   Test measurements: See below    PT Practice Pattern:H  Co morbidities:1-2  SURgical R TKR 20  INITIAL VISIT 20 CURRENT VISIT 21   PAIN 0-10/10   0/10   FUNCTIONAL SCALE LEFS () 84%  Not tested this date (prior () 55%)   PROM KE -7 -12 -4 deg    KF 43 55 100 deg                               STRENGHT (MMT) Quad tone  trace Fair                                              RESTRICTIONS/PRECAUTIONS: OA; osteopenia     Exercises/Interventions:   HEP Gait Re-education- Provided training and instruction to the patient for proper LE, core and proximal hip recruitment and positioning and eccentric body weight control with ambulation re-education including up and down stairs     Home Exercise Program:    [x] (03685) Reviewed/Progressed HEP activities related to strengthening, flexibility, endurance, ROM of core, proximal hip and LE for functional self-care, mobility, lifting and ambulation/stair navigation   [] (89526)Reviewed/Progressed HEP activities related to improving balance, coordination, kinesthetic sense, posture, motor skill, proprioception of core, proximal hip and LE for self care, mobility, lifting, and ambulation/stair navigation      Manual Treatments:  PROM / STM / Oscillations-Mobs:  G-I, II, III, IV (PA's, Inf., Post.)  [x] (71042) Provided manual therapy to mobilize LE, proximal hip and/or LS spine soft tissue/joints for the purpose of modulating pain, promoting relaxation,  increasing ROM, reducing/eliminating soft tissue swelling/inflammation/restriction, improving soft tissue extensibility and allowing for proper ROM for normal function with self care, mobility, lifting and ambulation. Modalities: CPx10'    [] GAME READY (VASO)- for significant edema, swelling, pain control. Charges:  Timed Code Treatment Minutes: 48'   Total Treatment Minutes: 61'         [] EVAL (LOW) 455 1011   [] EVAL (MOD) 22253  [] EVAL (HIGH) 91119   [] RE-EVAL     [x] BY(03483) x1    [] IONTO  [x] NMR (56922)  x1    [] VASO  [x] Manual (41363) x 1   [] Other:  [] TA x      [] Mech Traction (93940)  [] ES(attended) (13172)      [x] ES (un) (34477): NMES      MEDICARE CAP EXCEPTION DOCUMENTATION      I certify that this patient meets one of the below criteria necessary for becoming an exception to the Medicare cap on therapy services:    [x]  The patient has a condition identified by an ICD-10 code that has a direct and significant impact on the need for therapy. (Significantly impacts the rate of recovery.)                   [x]  The patient has a complexity identified by an ICD-10 code that has a direct and significant impact on the need for therapy. (Significantly impacts the rate of recovery and is associated with a primary condition.)         []  The patient has associated variables that influence the amount of treatment to include:  Social support, self-efficacy/motivation, prognosis, time since onset/acuity. []  The patient has generalized musculoskeletal conditions or a condition affecting multiple sites that will have a direct impact on the rate of recovery. []  The patient had a prior episode of outpatient therapy during this calendar year for a different condition. []  The patient has a mental or cognitive disorder in addition to the condition being treated that will have a direct and significant impact on the rate of recovery. GOALS:  Patient stated goal: be able to squat; bend knee and no pain   [x] Progressing: [] Met: [] Not Met: [] Adjusted    Therapist goals for Patient:   Short Term Goals: To be achieved in: 2 weeks  1. Independent in HEP and progression per patient tolerance, in order to prevent re-injury. [] Progressing: [x] Met: [] Not Met: [] Adjusted  2. Patient will have a decrease in pain to facilitate improvement in movement, function, and ADLs as indicated by Functional Deficits. [] Progressing: [x] Met: [] Not Met: [] Adjusted    Long Term Goals: To be achieved in: 8-10 weeks  1. Disability index score of 40% or less for the LEFS to assist with reaching prior level of function. [x] Progressing: [] Met: [] Not Met: [] Adjusted  2. Patient will demonstrate increased AROM to R knee ext to -2 and flex to 110  to allow for proper joint functioning as indicated by patients Functional Deficits. [x] Progressing: [x] Met:50%  [] Not Met: [] Adjusted  3.  Patient will demonstrate an increase in Strength to good proximal hip strength and control, within 5lb HHD in LE to allow for proper functional mobility as indicated by patients Functional Deficits. [x] Progressing: [] Met: [] Not Met: [] Adjusted  4. Patient will return to being able to ambulate with proper gait without an AD; ascend and descend stairs to basement with reciprocal gait  without increased symptoms or restriction. [x] Progressing: [] Met: [] Not Met: [] Adjusted      Overall Progression Towards Functional goals/ Treatment Progress Update:  [] Patient is progressing as expected towards functional goals listed. [x] Progression is slowed due to complexities/Impairments listed. [] Progression has been slowed due to co-morbidities. [] Plan just implemented, too soon to assess goals progression <30days   [] Goals require adjustment due to lack of progress  [] Patient is not progressing as expected and requires additional follow up with physician  [] Other :     Prognosis for POC: [x] Good [x] Fair  [] Poor      Patient requires continued skilled intervention: [x] Yes  [] No    Treatment/Activity Tolerance:  [x] Patient able to complete treatment  [] Patient limited by fatigue  [] Patient limited by pain    [] Patient limited by other medical complications  [] Other:     ASSESSMENT:  Patient continues to show improvement with skilled PT. She has improving ROM at start of session and requires less time with MT in order to achieve improved motion. She is now ambulating with increased KE in stance phase on R and is using SPC with improved gait mechanics compared to RW. She is now negotiating stairs with reciprocal pattern and was able to improve mil with gait retraining this session safely. Overall improving in functional ROM of her knee. Would benefit from continued focus in functional mobility and strengthening. Pt will benefit from continued skilled intervention for ROM, strength and return to function and continues to necessitate skilled care. Dolores Rasmussen PLAN: Cont R knee ROM and strength and normalization of gait mechanics. Cont Pt 3x weekly for 2-4 weeks. [x] Continue per plan of care [] Alter current plan (see comments above)  [] Plan of care initiated [] Hold pending MD visit [] Discharge      Electronically signed by:  Bronson Petersen, PT , DPT, OCS [id number]     Note: If patient does not return for scheduled/ recommended follow up visits, this note will serve as a discharge from care along with most recent update on progress.

## 2021-02-26 NOTE — OP NOTE
[facility], [address]  Phone: [phone]  Fax [phone]        Physical Therapy Treatment Note/ Progress Report:           Date:  2021    Patient Name:  Scott Hopper    :  1950  MRN: [id number]  Restrictions/Precautions:    Medical/Treatment Diagnosis Information:  Diagnosis: s/p r TKR sx date 2020 M25.561; M17.11 MINH 20  Treatment Diagnosis: R knee pain O78.853  Insurance/Certification information:  PT Insurance Information: /[employer]  Physician Information:  Referring Practitioner: Dr. Wilfrid Barnes  Has the plan of care been signed (Y/N):        []  Yes  [x]  No     Date of Patient follow up with Physician:     Is this a Progress Report:     []  Yes  [x]  No        If Yes:  Date Range for reporting period:  Beginning 2021  Ending :21    Progress report will be due (10 Rx or 30 days whichever is less): 2021 (done 21 visit 20) Due 3/11/21 visit 30      Recertification will be due (POC Duration  / 90 days whichever is less): 2021       Visit # Date Range Insurance Allowable Requires auth   28 2020- BMN    [x]no        []yes:           SUBJECTIVE:     Pt states she feels like she is making progress. She has her next MD follow up on Monday morning before her PT appt. Pain:  0/10    OBJECTIVE: See below    ? Observation:  Improved gait with greater KE in stance on R, use of SPC  ?  Test measurements: See below    PT Practice Pattern:H  Co morbidities:1-2  SURgical R TKR 20  INITIAL VISIT 20 CURRENT VISIT 21   PAIN 0-10/10   0/10   FUNCTIONAL SCALE LEFS () 84%  Not tested this date (prior () 55%)   PROM KE -7 -12 -4 deg    KF 43 55 100 deg                               STRENGHT (MMT) Quad tone  trace Fair                                              RESTRICTIONS/PRECAUTIONS: OA; osteopenia     Exercises/Interventions: HEP instruction was provided and handouts given to include:  Access Code: [email or web address]   Patient Portal: [email or web address]      Therapeutic Ex (03950) Reps/Sets/sec Notes/CUES   april stretch KF/KE stretch 1'x4 ea    Recumbent bike 6' for ROM    Incline stretch 5x30\"    EZ stretch EXT/FLX 3' FLX  4' EXT    30\"x3    2x20  2 red, 1 blue spring        Manual Intervention (66006)     Patellar mobs GIII, IASTM quad ITB HS, ant/post tib mobs GIII, PROM EXT, manual HS stretching, massage stick to R HS, gastroc  15'prone           PROM EXT ROM 3'         NMR re-education (43571)  CUES NEEDED      Biphasic NMES    HS activation contract relax and seated SAQ quad activation contract relax; 10'10\"/10\"  2.0 ramp            3 red springs, 1 blue         2R  20x ea       Gait retraining with SPC 5' Improved upright posturing, slower mil than with walker; able to increase mil safely with improved mechanics   Stair retraining 5'    Step ups L on 4\" 2x15 Focus on TKE, eccentric control; used TB to facilitate TKE    Therapeutic Activity (97908)                                         Therapeutic Exercise and NMR EXR  [x] (44181) Provided verbal/tactile cueing for activities related to strengthening, flexibility, endurance, ROM for improvements in LE, proximal hip, and core control with self care, mobility, lifting, ambulation.  [] (24938) Provided verbal/tactile cueing for activities related to improving balance, coordination, kinesthetic sense, posture, motor skill, proprioception  to assist with LE, proximal hip, and core control in self care, mobility, lifting, ambulation and eccentric single leg control.      NMR and Therapeutic Activities:    [x] (61797 or 85601) Provided verbal/tactile cueing for activities related to improving balance, coordination, kinesthetic sense, posture, motor skill, proprioception and motor activation to allow for proper function of core, proximal hip and LE with self care and ADLs [] Progressing: [x] Met: [] Not Met: [] Adjusted  2. Patient will have a decrease in pain to facilitate improvement in movement, function, and ADLs as indicated by Functional Deficits. [] Progressing: [x] Met: [] Not Met: [] Adjusted    Long Term Goals: To be achieved in: 8-10 weeks  1. Disability index score of 40% or less for the LEFS to assist with reaching prior level of function. [x] Progressing: [] Met: [] Not Met: [] Adjusted  2. Patient will demonstrate increased AROM to R knee ext to -2 and flex to 110  to allow for proper joint functioning as indicated by patients Functional Deficits. [x] Progressing: [x] Met:50%  [] Not Met: [] Adjusted  3. Patient will demonstrate an increase in Strength to good proximal hip strength and control, within 5lb HHD in LE to allow for proper functional mobility as indicated by patients Functional Deficits. [x] Progressing: [] Met: [] Not Met: [] Adjusted  4. Patient will return to being able to ambulate with proper gait without an AD; ascend and descend stairs to basement with reciprocal gait  without increased symptoms or restriction. [x] Progressing: [] Met: [] Not Met: [] Adjusted      Overall Progression Towards Functional goals/ Treatment Progress Update:  [] Patient is progressing as expected towards functional goals listed. [x] Progression is slowed due to complexities/Impairments listed. [] Progression has been slowed due to co-morbidities.   [] Plan just implemented, too soon to assess goals progression <30days   [] Goals require adjustment due to lack of progress  [] Patient is not progressing as expected and requires additional follow up with physician  [] Other :     Prognosis for POC: [x] Good [x] Fair  [] Poor      Patient requires continued skilled intervention: [x] Yes  [] No    Treatment/Activity Tolerance:  [x] Patient able to complete treatment  [] Patient limited by fatigue [] Patient limited by pain    [] Patient limited by other medical complications  [] Other:     ASSESSMENT:  Patient continues to show improvement with skilled PT. She has improving ROM at start of session and requires less time with MT in order to achieve improved motion. She is now ambulating with increased KE in stance phase on R and is using SPC with improved gait mechanics compared to RW. She is now negotiating stairs with reciprocal pattern and was able to improve mil with gait retraining this session safely. Overall improving in functional ROM of her knee. Would benefit from continued focus in functional mobility and strengthening. Pt will benefit from continued skilled intervention for ROM, strength and return to function and continues to necessitate skilled care. Ikey Donte PLAN: Cont R knee ROM and strength and normalization of gait mechanics. Cont Pt 3x weekly for 2-4 weeks. [x] Continue per plan of care [] Alter current plan (see comments above)  [] Plan of care initiated [] Hold pending MD visit [] Discharge      Electronically signed by:  Ace Bloch, PT , DPT, OCS [id number]     Note: If patient does not return for scheduled/ recommended follow up visits, this note will serve as a discharge from care along with most recent update on progress.

## 2021-03-01 ENCOUNTER — HOSPITAL ENCOUNTER (OUTPATIENT)
Dept: PHYSICAL THERAPY | Age: 71
Setting detail: THERAPIES SERIES
Discharge: HOME | End: 2021-03-01
Payer: MEDICARE

## 2021-03-01 PROCEDURE — 97112 NEUROMUSCULAR REEDUCATION: CPT | Performed by: PHYSICAL THERAPIST

## 2021-03-01 PROCEDURE — 97140 MANUAL THERAPY 1/> REGIONS: CPT | Performed by: PHYSICAL THERAPIST

## 2021-03-01 PROCEDURE — G0283 ELEC STIM OTHER THAN WOUND: HCPCS | Performed by: PHYSICAL THERAPIST

## 2021-03-01 PROCEDURE — 97110 THERAPEUTIC EXERCISES: CPT | Performed by: PHYSICAL THERAPIST

## 2021-03-01 NOTE — FLOWSHEET NOTE
[facility], [address]  Phone: [phone]  Fax [phone]        Physical Therapy Treatment Note/ Progress Report:           Date:  3/1/2021    Patient Name:  Ami Virk    :  1950  MRN: [id number]  Restrictions/Precautions:    Medical/Treatment Diagnosis Information:  Diagnosis: s/p r TKR sx date 2020 M25.561; M17.11 MINH 20  Treatment Diagnosis: R knee pain K85.517  Insurance/Certification information:  PT Insurance Information: /[employer]  Physician Information:  Referring Practitioner: Dr. Combs No  Has the plan of care been signed (Y/N):        []  Yes  [x]  No     Date of Patient follow up with Physician:     Is this a Progress Report:     []  Yes  [x]  No        If Yes:  Date Range for reporting period:  Beginning 2021  Ending :21    Progress report will be due (10 Rx or 30 days whichever is less): 2021 (done 21 visit 20) Due 3/11/21 visit 30      Recertification will be due (POC Duration  / 90 days whichever is less): 2021       Visit # Date Range Insurance Allowable Requires auth   29 2020- BMN    [x]no        []yes:           SUBJECTIVE:     Pt states MD says she is making progress. She returns in 2 weeks. Pain:  0/10    OBJECTIVE: See below     Observation:  Stiff gait this visit. Significant knee flexion and decreased FLX/EXT moment.    Test measurements: See below    PT Practice Pattern:H  Co morbidities:1-2  SURgical R TKR 20  INITIAL VISIT 20 CURRENT VISIT 21   PAIN 0-10/10   0/10   FUNCTIONAL SCALE LEFS () 84%  Not tested this date (prior () 55%)   PROM KE -7 -12 -4 deg    KF 43 55 100 deg                               STRENGHT (MMT) Quad tone  trace Fair                                              RESTRICTIONS/PRECAUTIONS: OA; osteopenia     Exercises/Interventions:   HEP instruction was provided and handouts given to include:  Access Code: [email or web address]   Patient Portal: [email or web address]      Therapeutic Ex (25574) Reps/Sets/sec Notes/CUES      Recumbent bike 6' for ROM    Incline stretch 5x30\" Focus form      30\"x3    2x20  2 red, 1 blue spring   LEONARDO TKE with RLE staggered back 45# 25x3\" Focus on GS/QS and upright posture                       Manual Intervention (64670)     IASTM quad, HS, ITB  Patellar mobs GIII  Modified Chacorta stretch and prone HS stretch manual x15'           PROM EXT ROM 3'         NMR re-education (80579)  CUES NEEDED      Biphasic NMES    HS activation contract relax and seated SAQ quad activation contract relax; 10'10\"/10\"  2.0 ramp    1/2 foam roll to promote more use of Quad less HF   april stretch RLE WB focus on Glute and quad and upright posture and L KF 5x15\"    3 red springs, 1 blue   SLS RLE 5x10\" with two hand support at 1/2 wall Focus on elongated LE    2R  20x ea       Gait retraining with SPC 5' Improved upright posturing, slower mil than with walker; able to increase mil safely with improved mechanics      Focus on TKE, eccentric control; used TB to facilitate TKE    Therapeutic Activity (42096)                                         Therapeutic Exercise and NMR EXR  [x] (15062) Provided verbal/tactile cueing for activities related to strengthening, flexibility, endurance, ROM for improvements in LE, proximal hip, and core control with self care, mobility, lifting, ambulation.  [] (17485) Provided verbal/tactile cueing for activities related to improving balance, coordination, kinesthetic sense, posture, motor skill, proprioception  to assist with LE, proximal hip, and core control in self care, mobility, lifting, ambulation and eccentric single leg control.      NMR and Therapeutic Activities:    [x] (61838 or 85318) Provided verbal/tactile cueing for activities related to improving balance, coordination, kinesthetic sense, posture, motor skill, proprioception and Progressing: [x] Met:50%  [] Not Met: [] Adjusted  3. Patient will demonstrate an increase in Strength to good proximal hip strength and control, within 5lb HHD in LE to allow for proper functional mobility as indicated by patients Functional Deficits. [x] Progressing: [] Met: [] Not Met: [] Adjusted  4. Patient will return to being able to ambulate with proper gait without an AD; ascend and descend stairs to basement with reciprocal gait  without increased symptoms or restriction. [x] Progressing: [] Met: [] Not Met: [] Adjusted      Overall Progression Towards Functional goals/ Treatment Progress Update:  [] Patient is progressing as expected towards functional goals listed. [x] Progression is slowed due to complexities/Impairments listed. [] Progression has been slowed due to co-morbidities. [] Plan just implemented, too soon to assess goals progression <30days   [] Goals require adjustment due to lack of progress  [] Patient is not progressing as expected and requires additional follow up with physician  [] Other :     Prognosis for POC: [x] Good [x] Fair  [] Poor      Patient requires continued skilled intervention: [x] Yes  [] No    Treatment/Activity Tolerance:  [x] Patient able to complete treatment  [] Patient limited by fatigue  [] Patient limited by pain    [] Patient limited by other medical complications  [] Other:     ASSESSMENT:  Upon arrival it appeared as if pt was very stiff from her gait pattern, however her joint ROM and flexibility was good this visit. She is lacking the muscle strength to get through he new ROM functionally. She is able to perform more functional CKC co contraction of glutes and Quad with MC/VC this visit in prep for gait training. PLAN: Cont R knee ROM and strength and normalization of gait mechanics. Cont Pt 3x weekly for 2-4 weeks.    [x] Continue per plan of care [] Alter current plan (see comments above)  [] Plan of care initiated [] Hold pending MD visit [] Discharge      Electronically signed by:  Desirae Ramirez, PT , [id number]    Note: If patient does not return for scheduled/ recommended follow up visits, this note will serve as a discharge from care along with most recent update on progress.

## 2021-03-03 ENCOUNTER — HOSPITAL ENCOUNTER (OUTPATIENT)
Dept: PHYSICAL THERAPY | Age: 71
Setting detail: THERAPIES SERIES
Discharge: HOME | End: 2021-03-03
Payer: MEDICARE

## 2021-03-03 ENCOUNTER — IMMUNIZATION (OUTPATIENT)
Dept: PRIMARY CARE CLINIC | Age: 71
End: 2021-03-03
Payer: MEDICARE

## 2021-03-03 PROCEDURE — G0283 ELEC STIM OTHER THAN WOUND: HCPCS | Performed by: PHYSICAL THERAPIST

## 2021-03-03 PROCEDURE — 97016 VASOPNEUMATIC DEVICE THERAPY: CPT | Performed by: PHYSICAL THERAPIST

## 2021-03-03 PROCEDURE — 0001A COVID-19, PFIZER VACCINE 30MCG/0.3ML DOSE: CPT | Performed by: FAMILY MEDICINE

## 2021-03-03 PROCEDURE — 97112 NEUROMUSCULAR REEDUCATION: CPT | Performed by: PHYSICAL THERAPIST

## 2021-03-03 PROCEDURE — 97110 THERAPEUTIC EXERCISES: CPT | Performed by: PHYSICAL THERAPIST

## 2021-03-03 PROCEDURE — 91300 COVID-19, PFIZER VACCINE 30MCG/0.3ML DOSE: CPT | Performed by: FAMILY MEDICINE

## 2021-03-03 PROCEDURE — 97140 MANUAL THERAPY 1/> REGIONS: CPT | Performed by: PHYSICAL THERAPIST

## 2021-03-03 NOTE — FLOWSHEET NOTE
[facility], [address]  Phone: [phone]  Fax [phone]        Physical Therapy Treatment Note/ Progress Report:           Date:  3/3/2021    Patient Name:  Raymond Ta    :  1950  MRN: [id number]  Restrictions/Precautions:    Medical/Treatment Diagnosis Information:  Diagnosis: s/p r TKR sx date 2020 M25.561; M17.11 MINH 20  Treatment Diagnosis: R knee pain Q40.614  Insurance/Certification information:  PT Insurance Information: /humana  Physician Information:  Referring Practitioner: Dr. Charu Valentin  Has the plan of care been signed (Y/N):        []  Yes  [x]  No     Date of Patient follow up with Physician:     Is this a Progress Report:     []  Yes  [x]  No        If Yes:  Date Range for reporting period:  Beginning 2021  Ending :21    Progress report will be due (10 Rx or 30 days whichever is less): 2021 (done 21 visit 20) Due 3/11/21 visit 30      Recertification will be due (POC Duration  / 90 days whichever is less): 2021       Visit # Date Range Insurance Allowable Requires auth   30 2020- BMN    [x]no        []yes:           SUBJECTIVE:   Pt reports she is feeling good, walking better. Pain:  0/10    OBJECTIVE: See below     Observation:  Stiff gait this visit. Significantly increased knee flexion.    Test measurements: See below    PT Practice Pattern:H  Co morbidities:1-2  SURgical R TKR 20  INITIAL VISIT 20 CURRENT VISIT 21   PAIN 0-10/10   0/10   FUNCTIONAL SCALE LEFS () 84%  Not tested this date (prior () 55%)   PROM KE -7 -12 -4 deg    KF 43 55 100 deg                               STRENGHT (MMT) Quad tone  trace Fair                                              RESTRICTIONS/PRECAUTIONS: OA; osteopenia     Exercises/Interventions:   HEP instruction was provided and handouts given to include:  Access Code: [email or web address]   Patient Portal: [email or web address]/      Therapeutic Ex (94859) Reps/Sets/sec Notes/CUES      Recumbent bike 6' for ROM    Incline stretch 5x30\" Focus form      30\"x3    2x20  2 red, 1 blue spring   LEONARDO TKE with RLE staggered back 45# 25x3\" Focus on GS/QS and upright posture   BRENT R HF  45# 15x                   Manual Intervention (02492)                PROM EXT ROM 3'         NMR re-education (24735)  CUES NEEDED      Biphasic NMES    HS activation contract relax and seated SAQ quad activation contract relax; LAQ and [address] alt12'   (6' ea)10\"/10\"  2.0 ramp    1/2 foam roll to promote more use of Quad less HF   april stretch RLE WB focus on Glute and quad and upright posture and L KF 5x15\"    3 red springs, 1 blue   SLS RLE 5x10\" with two hand support at 1/2 wall Focus on elongated LE   Reformer walking and squat to full KE 2R  30x ea       Gait retraining with SPC 5' Improved upright posturing, slower mil than with walker; able to increase mil safely with improved mechanics      Focus on TKE, eccentric control; used TB to facilitate TKE    Therapeutic Activity (13442)                                         Therapeutic Exercise and NMR EXR  [x] (09127) Provided verbal/tactile cueing for activities related to strengthening, flexibility, endurance, ROM for improvements in LE, proximal hip, and core control with self care, mobility, lifting, ambulation.  [] (16213) Provided verbal/tactile cueing for activities related to improving balance, coordination, kinesthetic sense, posture, motor skill, proprioception  to assist with LE, proximal hip, and core control in self care, mobility, lifting, ambulation and eccentric single leg control.      NMR and Therapeutic Activities:    [x] (42233 or 40164) Provided verbal/tactile cueing for activities related to improving balance, coordination, kinesthetic sense, posture, motor skill, proprioception and motor activation to allow for proper function of core, proximal hip and LE with self care and ADLs  [] (88516) Gait Re-education- Provided training and instruction to the patient for proper LE, core and proximal hip recruitment and positioning and eccentric body weight control with ambulation re-education including up and down stairs     Home Exercise Program:    [x] (99845) Reviewed/Progressed HEP activities related to strengthening, flexibility, endurance, ROM of core, proximal hip and LE for functional self-care, mobility, lifting and ambulation/stair navigation   [] (20964)Reviewed/Progressed HEP activities related to improving balance, coordination, kinesthetic sense, posture, motor skill, proprioception of core, proximal hip and LE for self care, mobility, lifting, and ambulation/stair navigation      Manual Treatments:  PROM / STM / Oscillations-Mobs:  G-I, II, III, IV (PA's, Inf., Post.)  [x] (45923) Provided manual therapy to mobilize LE, proximal hip and/or LS spine soft tissue/joints for the purpose of modulating pain, promoting relaxation,  increasing ROM, reducing/eliminating soft tissue swelling/inflammation/restriction, improving soft tissue extensibility and allowing for proper ROM for normal function with self care, mobility, lifting and ambulation. Modalities:   [x] GAME READY (VASO)- for significant edema, swelling, pain control. x10'     Charges:  Timed Code Treatment Minutes: 48'   Total Treatment Minutes: 61'         [] EVAL (LOW) 455 1011   [] EVAL (MOD) 45980  [] EVAL (HIGH) 78708   [] RE-EVAL     [x] CB(11821) x1    [] IONTO  [x] NMR (75324)  x1    [] VASO  [x] Manual (20533) x 1   [] Other:  [] TA x      [] Mech Traction (83057)  [] ES(attended) (81882)      [x] ES (un) (65061):  NMES         MEDICARE CAP EXCEPTION DOCUMENTATION      I certify that this patient meets one of the below criteria necessary for becoming an exception to the Medicare cap on therapy services:    [x]  The patient has a condition identified by an ICD-10 code that has a direct and significant impact on the need for therapy. (Significantly impacts the rate of recovery.)                   [x]  The patient has a complexity identified by an ICD-10 code that has a direct and significant impact on the need for therapy. (Significantly impacts the rate of recovery and is associated with a primary condition.)         []  The patient has associated variables that influence the amount of treatment to include:  Social support, self-efficacy/motivation, prognosis, time since onset/acuity. []  The patient has generalized musculoskeletal conditions or a condition affecting multiple sites that will have a direct impact on the rate of recovery. []  The patient had a prior episode of outpatient therapy during this calendar year for a different condition. []  The patient has a mental or cognitive disorder in addition to the condition being treated that will have a direct and significant impact on the rate of recovery. GOALS:  Patient stated goal: be able to squat; bend knee and no pain   [x] Progressing: [] Met: [] Not Met: [] Adjusted    Therapist goals for Patient:   Short Term Goals: To be achieved in: 2 weeks  1. Independent in HEP and progression per patient tolerance, in order to prevent re-injury. [] Progressing: [x] Met: [] Not Met: [] Adjusted  2. Patient will have a decrease in pain to facilitate improvement in movement, function, and ADLs as indicated by Functional Deficits. [] Progressing: [x] Met: [] Not Met: [] Adjusted    Long Term Goals: To be achieved in: 8-10 weeks  1. Disability index score of 40% or less for the LEFS to assist with reaching prior level of function. [x] Progressing: [] Met: [] Not Met: [] Adjusted  2. Patient will demonstrate increased AROM to R knee ext to -2 and flex to 110  to allow for proper joint functioning as indicated by patients Functional Deficits. [x] Progressing: [x] Met:50%  [] Not Met: [] Adjusted  3. Patient will demonstrate an increase in Strength to good proximal hip strength and control, within 5lb HHD in LE to allow for proper functional mobility as indicated by patients Functional Deficits. [x] Progressing: [] Met: [] Not Met: [] Adjusted  4. Patient will return to being able to ambulate with proper gait without an AD; ascend and descend stairs to basement with reciprocal gait  without increased symptoms or restriction. [x] Progressing: [] Met: [] Not Met: [] Adjusted      Overall Progression Towards Functional goals/ Treatment Progress Update:  [] Patient is progressing as expected towards functional goals listed. [x] Progression is slowed due to complexities/Impairments listed. [] Progression has been slowed due to co-morbidities. [] Plan just implemented, too soon to assess goals progression <30days   [] Goals require adjustment due to lack of progress  [] Patient is not progressing as expected and requires additional follow up with physician  [] Other :     Prognosis for POC: [x] Good [x] Fair  [] Poor      Patient requires continued skilled intervention: [x] Yes  [] No    Treatment/Activity Tolerance:  [x] Patient able to complete treatment  [] Patient limited by fatigue  [] Patient limited by pain    [] Patient limited by other medical complications  [] Other:     ASSESSMENT:  Pt arrived with much improved gait this visit. She presents with more fluid flx/ext moments and less limp, more upright posture. She will bring one functional task to improve upon to each visit that we can work on, so as not to be too mundane with tasks and avoid acclimation of the muscles. PLAN: Cont R knee ROM and strength and normalization of gait mechanics. Cont Pt 3x weekly for 2-4 weeks.    [x] Continue per plan of care [] Alter current plan (see comments above)  [] Plan of care initiated [] Hold pending MD visit [] Discharge      Electronically signed by:  Vandana Begum PT , [id number]    Note: If patient does not return for scheduled/ recommended follow up visits, this note will serve as a discharge from care along with most recent update on progress.

## 2021-03-05 ENCOUNTER — HOSPITAL ENCOUNTER (OUTPATIENT)
Dept: PHYSICAL THERAPY | Age: 71
Setting detail: THERAPIES SERIES
Discharge: HOME | End: 2021-03-05
Payer: MEDICARE

## 2021-03-05 PROCEDURE — 97110 THERAPEUTIC EXERCISES: CPT

## 2021-03-05 PROCEDURE — 97016 VASOPNEUMATIC DEVICE THERAPY: CPT

## 2021-03-05 PROCEDURE — 97112 NEUROMUSCULAR REEDUCATION: CPT

## 2021-03-05 PROCEDURE — 97140 MANUAL THERAPY 1/> REGIONS: CPT

## 2021-03-05 NOTE — FLOWSHEET NOTE
[facility],  [address]  Phone: [phone]  Fax [phone]        Physical Therapy Treatment Note/ Progress Report:           Date:  3/5/2021    Patient Name:  Eris Tirado    :  1950  MRN: [id number]  Restrictions/Precautions:    Medical/Treatment Diagnosis Information:  Diagnosis: s/p r TKR sx date 2020 M25.561; M17.11 MINH 20  Treatment Diagnosis: R knee pain H69.028  Insurance/Certification information:  PT Insurance Information: /humana  Physician Information:  Referring Practitioner: Dr. Anisa Alvarez  Has the plan of care been signed (Y/N):        []  Yes  [x]  No     Date of Patient follow up with Physician:     Is this a Progress Report:     []  Yes  [x]  No        If Yes:  Date Range for reporting period:  Beginning 2021  Ending :21    Progress report will be due (10 Rx or 30 days whichever is less): 2021 (done 21 visit 20) Due 3/11/21 visit 30      Recertification will be due (POC Duration  / 90 days whichever is less): 2021       Visit # Date Range Insurance Allowable Requires auth   31 2020- BMN    [x]no        []yes:           SUBJECTIVE:   Pt notes general stiffness this morning but otherwise has been walking a lot around her house. Pain:  0/10    OBJECTIVE: See below     Observation:  Stiff gait this visit. Significantly increased knee flexion.    Test measurements: See below    PT Practice Pattern:H  Co morbidities:1-2  SURgical R TKR 20  INITIAL VISIT 20 CURRENT VISIT 21   PAIN 0-10/10   0/10   FUNCTIONAL SCALE LEFS () 84%  Not tested this date (prior () 55%)   PROM KE -7 -12 -4 deg    KF 43 55 100 deg                               STRENGHT (MMT) Quad tone  trace Fair                                              RESTRICTIONS/PRECAUTIONS: OA; osteopenia     Exercises/Interventions:   HEP instruction was provided and handouts given to include:  Access Code: [email or web address]   Patient Portal: [email or web address]      Therapeutic Ex (70727) Reps/Sets/sec Notes/CUES      Recumbent bike 6' for ROM    Incline stretch 5x30\" Focus form      30\"x3    2x20  2 red, 1 blue spring   LEONARDO TKE with RLE staggered back 45# 25x3\" Focus on GS/QS and upright posture   [facility] R HF  45# 15x                   Manual Intervention (95438)     Patellar, tibfem mobs GIII 5'         Seated MWM for KF EOP + distraction 3'    PROM EXT ROM 3'         NMR re-education (91301)  CUES NEEDED      12'   (6' ea)10\"/10\"  2.0 ramp    1/2 foam roll to promote more use of Quad less HF   april stretch RLE WB focus on Glute and quad and upright posture and L KF 5x15\"    Reformer KF stretch 30\"x5 3 red springs, 1 blue    5x10\" with two hand support at 1/2 wall Focus on elongated LE   Reformer walking and squat to full KE 2R  30x ea       Gait retraining on treadmill 5' Focus on TKE in stance and KF in swing; Improved upright posturing, able to increase mil safely with improved mechanics      Step ups L on 4\"  Fwd  Lateral 2x10 ea Fatigued following  Focus on TKE, eccentric control; used TB to facilitate TKE    Therapeutic Activity (67741)                                         Therapeutic Exercise and NMR EXR  [x] (94154) Provided verbal/tactile cueing for activities related to strengthening, flexibility, endurance, ROM for improvements in LE, proximal hip, and core control with self care, mobility, lifting, ambulation.  [] (50034) Provided verbal/tactile cueing for activities related to improving balance, coordination, kinesthetic sense, posture, motor skill, proprioception  to assist with LE, proximal hip, and core control in self care, mobility, lifting, ambulation and eccentric single leg control.      NMR and Therapeutic Activities:    [x] (61656 or 34066) Provided verbal/tactile cueing for activities related to improving balance, coordination, kinesthetic sense, posture, motor skill, proprioception and motor activation to allow for proper function of core, proximal hip and LE with self care and ADLs  [] (86663) Gait Re-education- Provided training and instruction to the patient for proper LE, core and proximal hip recruitment and positioning and eccentric body weight control with ambulation re-education including up and down stairs     Home Exercise Program:    [x] (84228) Reviewed/Progressed HEP activities related to strengthening, flexibility, endurance, ROM of core, proximal hip and LE for functional self-care, mobility, lifting and ambulation/stair navigation   [] (09784)Reviewed/Progressed HEP activities related to improving balance, coordination, kinesthetic sense, posture, motor skill, proprioception of core, proximal hip and LE for self care, mobility, lifting, and ambulation/stair navigation      Manual Treatments:  PROM / STM / Oscillations-Mobs:  G-I, II, III, IV (PA's, Inf., Post.)  [x] (78396) Provided manual therapy to mobilize LE, proximal hip and/or LS spine soft tissue/joints for the purpose of modulating pain, promoting relaxation,  increasing ROM, reducing/eliminating soft tissue swelling/inflammation/restriction, improving soft tissue extensibility and allowing for proper ROM for normal function with self care, mobility, lifting and ambulation. Modalities:   [x] GAME READY (VASO)- for significant edema, swelling, pain control. x10'     Charges:  Timed Code Treatment Minutes: 39'   Total Treatment Minutes: 54'         [] EVAL (LOW) 455 1011   [] EVAL (MOD) 34854  [] EVAL (HIGH) 22444   [] RE-EVAL     [x] RA(89836) x1    [] IONTO  [x] NMR (59225)  x1    [x] VASO  [x] Manual (78102) x 1   [] Other:  [] TA x      [] Mech Traction (13421)  [] ES(attended) (84836)      [] ES (un) (03686):  NMES      MEDICARE CAP EXCEPTION DOCUMENTATION      I certify that this patient meets one of the below criteria necessary for becoming an exception to the Medicare cap on therapy services:    [x]  The patient has a condition identified by an ICD-10 code that has a direct and significant impact on the need for therapy. (Significantly impacts the rate of recovery.)                   [x]  The patient has a complexity identified by an ICD-10 code that has a direct and significant impact on the need for therapy. (Significantly impacts the rate of recovery and is associated with a primary condition.)         []  The patient has associated variables that influence the amount of treatment to include:  Social support, self-efficacy/motivation, prognosis, time since onset/acuity. []  The patient has generalized musculoskeletal conditions or a condition affecting multiple sites that will have a direct impact on the rate of recovery. []  The patient had a prior episode of outpatient therapy during this calendar year for a different condition. []  The patient has a mental or cognitive disorder in addition to the condition being treated that will have a direct and significant impact on the rate of recovery. GOALS:  Patient stated goal: be able to squat; bend knee and no pain   [x] Progressing: [] Met: [] Not Met: [] Adjusted    Therapist goals for Patient:   Short Term Goals: To be achieved in: 2 weeks  1. Independent in HEP and progression per patient tolerance, in order to prevent re-injury. [] Progressing: [x] Met: [] Not Met: [] Adjusted  2. Patient will have a decrease in pain to facilitate improvement in movement, function, and ADLs as indicated by Functional Deficits. [] Progressing: [x] Met: [] Not Met: [] Adjusted    Long Term Goals: To be achieved in: 8-10 weeks  1. Disability index score of 40% or less for the LEFS to assist with reaching prior level of function. [x] Progressing: [] Met: [] Not Met: [] Adjusted  2.  Patient will demonstrate increased AROM to R knee ext to -2 and flex to 110  to allow for proper joint functioning as indicated by patients Functional Deficits. [x] Progressing: [x] Met:50%  [] Not Met: [] Adjusted  3. Patient will demonstrate an increase in Strength to good proximal hip strength and control, within 5lb HHD in LE to allow for proper functional mobility as indicated by patients Functional Deficits. [x] Progressing: [] Met: [] Not Met: [] Adjusted  4. Patient will return to being able to ambulate with proper gait without an AD; ascend and descend stairs to basement with reciprocal gait  without increased symptoms or restriction. [x] Progressing: [] Met: [] Not Met: [] Adjusted      Overall Progression Towards Functional goals/ Treatment Progress Update:  [] Patient is progressing as expected towards functional goals listed. [x] Progression is slowed due to complexities/Impairments listed. [] Progression has been slowed due to co-morbidities. [] Plan just implemented, too soon to assess goals progression <30days   [] Goals require adjustment due to lack of progress  [] Patient is not progressing as expected and requires additional follow up with physician  [] Other :     Prognosis for POC: [x] Good [x] Fair  [] Poor      Patient requires continued skilled intervention: [x] Yes  [] No    Treatment/Activity Tolerance:  [x] Patient able to complete treatment  [] Patient limited by fatigue  [] Patient limited by pain    [] Patient limited by other medical complications  [] Other:     ASSESSMENT:  Pt continues with gradual improvement in R knee ROM, strength, and normalizing gait mechanics. This session focused on functional strengthening and gait retraining/endurance. Patient does fatigue with standing exercises but did well this session with use of treadmill for gait retraining on TKE in stance and KF in R swing phase as well as to build up endurance. She will bring one functional task to improve upon to each visit that we can work on, so as not to be too mundane with tasks and avoid acclimation of the muscles. PLAN: Cont R knee ROM and strength and normalization of gait mechanics. Cont Pt 3x weekly for 2-4 weeks. [x] Continue per plan of care [] Alter current plan (see comments above)  [] Plan of care initiated [] Hold pending MD visit [] Discharge      Electronically signed by:  Basilia Parra, PT , DPT, \[facility]\"" [id number]     Note: If patient does not return for scheduled/ recommended follow up visits, this note will serve as a discharge from care along with most recent update on progress.

## 2021-03-08 ENCOUNTER — HOSPITAL ENCOUNTER (OUTPATIENT)
Dept: PHYSICAL THERAPY | Age: 71
Setting detail: THERAPIES SERIES
Discharge: HOME | End: 2021-03-08
Payer: MEDICARE

## 2021-03-08 PROCEDURE — 97140 MANUAL THERAPY 1/> REGIONS: CPT | Performed by: PHYSICAL THERAPIST

## 2021-03-08 PROCEDURE — 97016 VASOPNEUMATIC DEVICE THERAPY: CPT | Performed by: PHYSICAL THERAPIST

## 2021-03-08 PROCEDURE — 97110 THERAPEUTIC EXERCISES: CPT | Performed by: PHYSICAL THERAPIST

## 2021-03-08 NOTE — FLOWSHEET NOTE
[facility], [address]  Phone: [phone]  Fax [phone]        Physical Therapy Treatment Note/ Progress Report:           Date:  3/8/2021    Patient Name:  Nazario Roberts    :  1950  MRN: [id number]  Restrictions/Precautions:    Medical/Treatment Diagnosis Information:  Diagnosis: s/p r TKR sx date 2020 M25.561; M17.11 MINH 20  Treatment Diagnosis: R knee pain S58.353  Insurance/Certification information:  PT Insurance Information: /humana  Physician Information:  Referring Practitioner: Dr. Eric Kelsey  Has the plan of care been signed (Y/N):        []  Yes  [x]  No     Date of Patient follow up with Physician:     Is this a Progress Report:     []  Yes  [x]  No        If Yes:  Date Range for reporting period:  Beginning 2021  Ending :21    Progress report will be due (10 Rx or 30 days whichever is less): 2021 (done 21 visit 20) Due 3/11/21 visit 30      Recertification will be due (POC Duration  / 90 days whichever is less): 2021       Visit # Date Range Insurance Allowable Requires auth   32 2020- BMN    [x]no        []yes:           SUBJECTIVE:    Pt reports she she was able to reach a milestone with her bending. Pt states her ankle is hurting on the outside when she is trying to sleep. Pins and needling and burning. Pain:  3-4/10 ankle    OBJECTIVE: See below     Observation:  Hypomobile post distal fib, post talar, calcaneal EV, forefoot, proximal fib ant/post mobility,  TTP and TPs present as well as myofacial restrictions in ant tib/post tib/medial soleus.    Test measurements: See below    PT Practice Pattern:H  Co morbidities:1-2  SURgical R TKR 20  INITIAL VISIT 20 CURRENT VISIT 21   PAIN 0-10/10   0/10   FUNCTIONAL SCALE LEFS () 84%  Not tested this date (prior () 55%)   PROM KE -7 -12 -4 deg     43 55 100 deg STRENGHT (MMT) Quad tone  trace Fair                                              RESTRICTIONS/PRECAUTIONS: OA; osteopenia     Exercises/Interventions:   HEP instruction was provided and handouts given to include:  Access Code: [email or web address]   Patient Portal: [email or web address]      Therapeutic Ex (68191) Reps/Sets/sec Notes/CUES      Recumbent bike 6' for ROM    Incline stretch 5x30\" Focus form      30\"x3    2x20  2 red, 1 blue spring   Focus on GS/QS and upright posture      Seated HR for soleus and toe EXT for pushoff 2x10x3\" VC  +HEP 3/8   TB DF/PF with heel prop  X HEP 3/8   Pro slant clacaneal EV/forefoot DF/pronation 10x each lunge/10s each stride Required significant VC        Manual Intervention (06426)     5'                    TP release, STM and tib, post tib, soleus x13    Fib mobs distal and proximal, ant post; post talar mobs, calcaneal and forefoot mobs GIII  x10'              NMR re-education (42293)  CUES NEEDED      12'   (6' ea)10\"/10\"  2.0 ramp    1/2 foam roll to promote more use of Quad less HF      3 red springs, 1 blue   Focus on elongated LE         Focus on TKE in stance and KF in swing; Improved upright posturing, able to increase mil safely with improved mechanics      Fatigued following  Focus on TKE, eccentric control; used TB to facilitate TKE    Therapeutic Activity (22429)                                         Therapeutic Exercise and NMR EXR  [x] (95171) Provided verbal/tactile cueing for activities related to strengthening, flexibility, endurance, ROM for improvements in LE, proximal hip, and core control with self care, mobility, lifting, ambulation.  [] (55752) Provided verbal/tactile cueing for activities related to improving balance, coordination, kinesthetic sense, posture, motor skill, proprioception  to assist with LE, proximal hip, and core control in self care, mobility, lifting, ambulation and eccentric single leg control.      NMR and Therapeutic Activities:    [x] (88983 or 98095) Provided verbal/tactile cueing for activities related to improving balance, coordination, kinesthetic sense, posture, motor skill, proprioception and motor activation to allow for proper function of core, proximal hip and LE with self care and ADLs  [] (60904) Gait Re-education- Provided training and instruction to the patient for proper LE, core and proximal hip recruitment and positioning and eccentric body weight control with ambulation re-education including up and down stairs     Home Exercise Program:    [x] (44696) Reviewed/Progressed HEP activities related to strengthening, flexibility, endurance, ROM of core, proximal hip and LE for functional self-care, mobility, lifting and ambulation/stair navigation   [] (55804)Reviewed/Progressed HEP activities related to improving balance, coordination, kinesthetic sense, posture, motor skill, proprioception of core, proximal hip and LE for self care, mobility, lifting, and ambulation/stair navigation      Manual Treatments:  PROM / STM / Oscillations-Mobs:  G-I, II, III, IV (PA's, Inf., Post.)  [x] (13945) Provided manual therapy to mobilize LE, proximal hip and/or LS spine soft tissue/joints for the purpose of modulating pain, promoting relaxation,  increasing ROM, reducing/eliminating soft tissue swelling/inflammation/restriction, improving soft tissue extensibility and allowing for proper ROM for normal function with self care, mobility, lifting and ambulation. Modalities:   [x] GAME READY (VASO)- for significant edema, swelling, pain control. x10'     Charges:  Timed Code Treatment Minutes: 45   Total Treatment Minutes: 55         [] EVAL (LOW) 47957   [] EVAL (MOD) 68777  [] EVAL (HIGH) 22281   [] RE-EVAL     [x] UW(64128) x1    [] IONTO  [] NMR (41682)     [x] VASO  [x] Manual (83567) x 2   [] Other:  [] TA x      [] Mech Traction (41492)  [] ES(attended) (19661)      [] ES (un) (99594):  NMES      MEDICARE CAP EXCEPTION DOCUMENTATION      I certify that this patient meets one of the below criteria necessary for becoming an exception to the Medicare cap on therapy services:    [x]  The patient has a condition identified by an ICD-10 code that has a direct and significant impact on the need for therapy. (Significantly impacts the rate of recovery.)                   [x]  The patient has a complexity identified by an ICD-10 code that has a direct and significant impact on the need for therapy. (Significantly impacts the rate of recovery and is associated with a primary condition.)         []  The patient has associated variables that influence the amount of treatment to include:  Social support, self-efficacy/motivation, prognosis, time since onset/acuity. []  The patient has generalized musculoskeletal conditions or a condition affecting multiple sites that will have a direct impact on the rate of recovery. []  The patient had a prior episode of outpatient therapy during this calendar year for a different condition. []  The patient has a mental or cognitive disorder in addition to the condition being treated that will have a direct and significant impact on the rate of recovery. GOALS:  Patient stated goal: be able to squat; bend knee and no pain   [x] Progressing: [] Met: [] Not Met: [] Adjusted    Therapist goals for Patient:   Short Term Goals: To be achieved in: 2 weeks  1. Independent in HEP and progression per patient tolerance, in order to prevent re-injury. [] Progressing: [x] Met: [] Not Met: [] Adjusted  2. Patient will have a decrease in pain to facilitate improvement in movement, function, and ADLs as indicated by Functional Deficits. [] Progressing: [x] Met: [] Not Met: [] Adjusted    Long Term Goals: To be achieved in: 8-10 weeks  1. Disability index score of 40% or less for the LEFS to assist with reaching prior level of function.    [x] Progressing: [] Met: [] Not Met: [] Adjusted  2. Patient will demonstrate increased AROM to R knee ext to -2 and flex to 110  to allow for proper joint functioning as indicated by patients Functional Deficits. [x] Progressing: [x] Met:50%  [] Not Met: [] Adjusted  3. Patient will demonstrate an increase in Strength to good proximal hip strength and control, within 5lb HHD in LE to allow for proper functional mobility as indicated by patients Functional Deficits. [x] Progressing: [] Met: [] Not Met: [] Adjusted  4. Patient will return to being able to ambulate with proper gait without an AD; ascend and descend stairs to basement with reciprocal gait  without increased symptoms or restriction. [x] Progressing: [] Met: [] Not Met: [] Adjusted      Overall Progression Towards Functional goals/ Treatment Progress Update:  [] Patient is progressing as expected towards functional goals listed. [x] Progression is slowed due to complexities/Impairments listed. [] Progression has been slowed due to co-morbidities. [] Plan just implemented, too soon to assess goals progression <30days   [] Goals require adjustment due to lack of progress  [] Patient is not progressing as expected and requires additional follow up with physician  [] Other :     Prognosis for POC: [x] Good [x] Fair  [] Poor      Patient requires continued skilled intervention: [x] Yes  [] No    Treatment/Activity Tolerance:  [x] Patient able to complete treatment  [] Patient limited by fatigue  [] Patient limited by pain    [] Patient limited by other medical complications  [] Other:     ASSESSMENT:  Pt continues with gradual improvement in R knee ROM, strength, and normalizing gait mechanics. This session focused on functional strengthening and gait retraining through addressing the mechanical deficiencies in the R ankle and lower leg due to chronic biomechanical issues.    She will bring one functional task to improve upon to each visit that we can work on, so as not to be too mundane with tasks and avoid acclimation of the muscles. Patient will benefit from continued skilled intervention to increase ROM, flexibility, strength and function. PLAN: Cont R knee ROM and strength and normalization of gait mechanics. Cont Pt 3x weekly for 2-4 weeks. [x] Continue per plan of care [] Alter current plan (see comments above)  [] Plan of care initiated [] Hold pending MD visit [] Discharge      Electronically signed by:  Carlee Sutton, PT , [id number]    Note: If patient does not return for scheduled/ recommended follow up visits, this note will serve as a discharge from care along with most recent update on progress.

## 2021-03-10 ENCOUNTER — HOSPITAL ENCOUNTER (OUTPATIENT)
Dept: PHYSICAL THERAPY | Age: 71
Setting detail: THERAPIES SERIES
Discharge: HOME | End: 2021-03-10
Payer: MEDICARE

## 2021-03-10 PROCEDURE — 97140 MANUAL THERAPY 1/> REGIONS: CPT | Performed by: PHYSICAL THERAPIST

## 2021-03-10 PROCEDURE — 97110 THERAPEUTIC EXERCISES: CPT | Performed by: PHYSICAL THERAPIST

## 2021-03-10 PROCEDURE — 97016 VASOPNEUMATIC DEVICE THERAPY: CPT | Performed by: PHYSICAL THERAPIST

## 2021-03-10 PROCEDURE — 97112 NEUROMUSCULAR REEDUCATION: CPT | Performed by: PHYSICAL THERAPIST

## 2021-03-10 NOTE — FLOWSHEET NOTE
[facility], [address]  Phone: [phone]  Fax [phone]        Physical Therapy Treatment Note/ Progress Report:           Date:  3/10/2021    Patient Name:  Eris Tirado    :  1950  MRN: [id number]  Restrictions/Precautions:    Medical/Treatment Diagnosis Information:  Diagnosis: s/p r TKR sx date 2020 M25.561; M17.11 MINH 20  Treatment Diagnosis: R knee pain B34.524  Insurance/Certification information:  PT Insurance Information: /humana  Physician Information:  Referring Practitioner: Dr. Anisa Alvarez  Has the plan of care been signed (Y/N):        []  Yes  [x]  No     Date of Patient follow up with Physician:  3/15/21    Is this a Progress Report:     [x]  Yes  []  No        If Yes:  Date Range for reporting period:  Beginning 21  Ending :3/10/21    Progress report will be due (10 Rx or 30 days whichever is less): 2021 (done 21 visit 20) Due 3/11/21 visit 30 (done 3/10/21 visit 35)      Recertification will be due (POC Duration  / 90 days whichever is less): 2021       Visit # Date Range Insurance Allowable Requires auth   33 2020-3/10/21 BMN    [x]no        []yes:           SUBJECTIVE:        Pain:  /10 ankle    OBJECTIVE: See below     Observation:  Hypomobile post distal fib, post talar, calcaneal EV, forefoot, proximal fib ant/post mobility,  TTP and TPs present as well as myofacial restrictions in ant tib/post tib/medial soleus. All areas improved from last visit.    Test measurements: See below    PT Practice Pattern:H  Co morbidities:1-2  SURgical R TKR 20  INITIAL VISIT 20 CURRENT VISIT 3/10/21   PAIN 0-10/10   0/10   FUNCTIONAL SCALE LEFS () 84%  (60/80) 25%   PROM KE -7 -12 -4 deg    KF 43 55 100 deg                               STRENGHT (MMT) Quad tone  trace +/good -    KE       KF       HF       HAB RESTRICTIONS/PRECAUTIONS: OA; osteopenia     Exercises/Interventions:   HEP instruction was provided and handouts given to include:  Access Code: [email or web address]   Patient Portal: [email or web address]      Therapeutic Ex (82964) Reps/Sets/sec Notes/CUES      Recumbent bike 6' for ROM    Incline stretch 5x30\" Focus form      30\"x3    2x20  2 red, 1 blue spring   Focus on GS/QS and upright posture      VC  +HEP 3/8   X HEP 3/8   Required significant VC   prone HSC 30x              Manual Intervention (04983)     IASTM quad, patella, incisions, ITBB, HS, soleus, gastroc, post tib x13'                       Fib mobs distal and proximal, ant post; post talar mobs, calcaneal and forefoot mobs GIII  x15'              NMR re-education (08464)  CUES NEEDED      Biphasic NMES     LAQ and HSC alt  Step over cone and WB on LLE  15'   (6' ea)10\"/10\"  2.0 ramp    1/2 foam roll to promote more use of Quad less HF      3 red springs, 1 blue   Focus on elongated LE         Focus on TKE in stance and KF in swing;  Improved upright posturing, able to increase mil safely with improved mechanics   Stair retraining 8'  Stand on RLE step up tap LLE  Stand on RLE step up tap LLE  Step up onto RLE with full swing up LLE and down MC/VC for form, KE, co contraction at glutes and quads,    Fatigued following  Focus on TKE, eccentric control; used TB to facilitate TKE    Therapeutic Activity (89646)                                         Therapeutic Exercise and NMR EXR  [x] (25151) Provided verbal/tactile cueing for activities related to strengthening, flexibility, endurance, ROM for improvements in LE, proximal hip, and core control with self care, mobility, lifting, ambulation.  [] (25010) Provided verbal/tactile cueing for activities related to improving balance, coordination, kinesthetic sense, posture, motor skill, proprioception  to assist with LE, proximal hip, and core control in self care, mobility, lifting, ambulation and eccentric single leg control. NMR and Therapeutic Activities:    [x] (11929 or 93455) Provided verbal/tactile cueing for activities related to improving balance, coordination, kinesthetic sense, posture, motor skill, proprioception and motor activation to allow for proper function of core, proximal hip and LE with self care and ADLs  [] (05931) Gait Re-education- Provided training and instruction to the patient for proper LE, core and proximal hip recruitment and positioning and eccentric body weight control with ambulation re-education including up and down stairs     Home Exercise Program:    [x] (23234) Reviewed/Progressed HEP activities related to strengthening, flexibility, endurance, ROM of core, proximal hip and LE for functional self-care, mobility, lifting and ambulation/stair navigation   [] (64562)Reviewed/Progressed HEP activities related to improving balance, coordination, kinesthetic sense, posture, motor skill, proprioception of core, proximal hip and LE for self care, mobility, lifting, and ambulation/stair navigation      Manual Treatments:  PROM / STM / Oscillations-Mobs:  G-I, II, III, IV (PA's, Inf., Post.)  [x] (61580) Provided manual therapy to mobilize LE, proximal hip and/or LS spine soft tissue/joints for the purpose of modulating pain, promoting relaxation,  increasing ROM, reducing/eliminating soft tissue swelling/inflammation/restriction, improving soft tissue extensibility and allowing for proper ROM for normal function with self care, mobility, lifting and ambulation. Modalities:   [x] GAME READY (VASO)- for significant edema, swelling, pain control.    x10'     Charges:  Timed Code Treatment Minutes: 53   Total Treatment Minutes: 63         [] EVAL (LOW) 60737   [] EVAL (MOD) 98194  [] EVAL (HIGH) 70186   [] RE-EVAL     [x] NK(14180) x1    [] IONTO  [x] NMR (25275)  x2   [x] VASO  [x] Manual (40042) x 1   [] Other:  [] TA x      [] Mech Traction (77577)  [] ES(attended) (05471) function. [x] Progressing: [x] Met: [] Not Met: [] Adjusted  2. Patient will demonstrate increased AROM to R knee ext to -2 and flex to 110  to allow for proper joint functioning as indicated by patients Functional Deficits. [x] Progressing: [x] Met:50%  [] Not Met: [] Adjusted  3. Patient will demonstrate an increase in Strength to good proximal hip strength and control, within 5lb HHD in LE to allow for proper functional mobility as indicated by patients Functional Deficits. [x] Progressing: [] Met: [] Not Met: [] Adjusted  4. Patient will return to being able to ambulate with proper gait without an AD; ascend and descend stairs to basement with reciprocal gait  without increased symptoms or restriction. [x] Progressing: [] Met: [] Not Met: [] Adjusted      Overall Progression Towards Functional goals/ Treatment Progress Update:  [] Patient is progressing as expected towards functional goals listed. [x] Progression is slowed due to complexities/Impairments listed. [] Progression has been slowed due to co-morbidities. [] Plan just implemented, too soon to assess goals progression <30days   [] Goals require adjustment due to lack of progress  [] Patient is not progressing as expected and requires additional follow up with physician  [] Other :     Prognosis for POC: [x] Good [x] Fair  [] Poor       Patient requires continued skilled intervention: [x] Yes  [] No    Treatment/Activity Tolerance:  [x] Patient able to complete treatment  [] Patient limited by fatigue  [] Patient limited by pain    [] Patient limited by other medical complications   [] Other:     ASSESSMENT:  Pt continues with gradual improvement in R knee ROM, strength, and normalizing gait mechanics. This session focused on functional strengthening and gait retraining through addressing the mechanical deficiencies anali issues.    She will bring one functional task to improve upon to each visit that we can work on, so as not to be too mundane

## 2021-03-10 NOTE — PROGRESS NOTES
[facility], [address]  Phone: [phone]  Fax [phone]        Physical Therapy Treatment Note/ Progress Report:           Date:  3/10/2021    Patient Name:  Monse Young    :  1950  MRN: [id number]  Restrictions/Precautions:    Medical/Treatment Diagnosis Information:  Diagnosis: s/p r TKR sx date 2020 M25.561; M17.11 MINH 20  Treatment Diagnosis: R knee pain X56.850  Insurance/Certification information:  PT Insurance Information: /humana  Physician Information:  Referring Practitioner: Dr. Edda Hart  Has the plan of care been signed (Y/N):        []  Yes  [x]  No     Date of Patient follow up with Physician:  3/15/21    Is this a Progress Report:     [x]  Yes  []  No        If Yes:  Date Range for reporting period:  Beginning 21  Ending :3/10/21    Progress report will be due (10 Rx or 30 days whichever is less): 2021 (done 21 visit 20) Due 3/11/21 visit 30 (done 3/10/21 visit 35)      Recertification will be due (POC Duration  / 90 days whichever is less): 2021       Visit # Date Range Insurance Allowable Requires auth   33 2020-3/10/21 BMN    [x]no        []yes:           SUBJECTIVE:        Pain:  /10 ankle    OBJECTIVE: See below     Observation:  Hypomobile post distal fib, post talar, calcaneal EV, forefoot, proximal fib ant/post mobility,  TTP and TPs present as well as myofacial restrictions in ant tib/post tib/medial soleus. All areas improved from last visit.    Test measurements: See below    PT Practice Pattern:H  Co morbidities:1-2  SURgical R TKR 20  INITIAL VISIT 20 CURRENT VISIT 3/10/21   PAIN 0-10/10   0/10   FUNCTIONAL SCALE LEFS () 84%  (60/80) 25%   PROM KE -7 -12 -4 deg    KF 43 55 100 deg                               STRENGHT (MMT) Quad tone  trace +/good -    KE       KF       HF       HAB additional follow up with physician  [] Other :     Prognosis for POC: [x] Good [x] Fair  [] Poor       Patient requires continued skilled intervention: [x] Yes  [] No    Treatment/Activity Tolerance:  [x] Patient able to complete treatment  [] Patient limited by fatigue  [] Patient limited by pain    [] Patient limited by other medical complications   [] Other:     ASSESSMENT:  Pt continues with gradual improvement in R knee ROM, strength, and normalizing gait mechanics. This session focused on functional strengthening and gait retraining through addressing the mechanical deficiencies anali issues. She will bring one functional task to improve upon to each visit that we can work on, so as not to be too mundane with tasks and avoid acclimation of the muscles. Patient will benefit from continued skilled intervention to increase ROM, flexibility, strength and function. PLAN: Cont R knee ROM and strength and normalization of gait mechanics. Cont Pt 3x weekly for 2-4 weeks. MD NOTE NEXT VISIT. [x] Continue per plan of care [] Alter current plan (see comments above)  [] Plan of care initiated [] Hold pending MD visit [] Discharge    Electronically signed by:  Vandana Begum PT , [id number]    Note: If patient does not return for scheduled/ recommended follow up visits, this note will serve as a discharge from care along with most recent update on progress.

## 2021-03-12 ENCOUNTER — APPOINTMENT (OUTPATIENT)
Dept: PHYSICAL THERAPY | Age: 71
End: 2021-03-12
Payer: MEDICARE

## 2021-03-12 ENCOUNTER — HOSPITAL ENCOUNTER (OUTPATIENT)
Dept: PHYSICAL THERAPY | Age: 71
Setting detail: THERAPIES SERIES
Discharge: HOME | End: 2021-03-12
Payer: MEDICARE

## 2021-03-12 PROCEDURE — 97016 VASOPNEUMATIC DEVICE THERAPY: CPT

## 2021-03-12 PROCEDURE — 97140 MANUAL THERAPY 1/> REGIONS: CPT

## 2021-03-12 PROCEDURE — 97112 NEUROMUSCULAR REEDUCATION: CPT

## 2021-03-12 PROCEDURE — 97110 THERAPEUTIC EXERCISES: CPT

## 2021-03-12 NOTE — FLOWSHEET NOTE
[facility], [address]  Phone: [phone]  Fax [phone]        Physical Therapy Treatment Note/ Progress Report:           Date:  3/12/2021    Patient Name:  Sandra Campos    :  1950  MRN: [id number]  Restrictions/Precautions:    Medical/Treatment Diagnosis Information:  Diagnosis: s/p r TKR sx date 2020 M25.561; M17.11 MINH 20  Treatment Diagnosis: R knee pain J47.666  Insurance/Certification information:  PT Insurance Information: /[employer]  Physician Information:  Referring Practitioner: Dr. Mackey Course  Has the plan of care been signed (Y/N):        []  Yes  [x]  No     Date of Patient follow up with Physician:  3/15/21    Is this a Progress Report:     [x]  Yes  []  No        If Yes:  Date Range for reporting period:  Beginning 21  Ending :3/10/21    Progress report will be due (10 Rx or 30 days whichever is less): 2021 (done 21 visit 20) Due 3/11/21 visit 30 (done 3/10/21 visit 35)      Recertification will be due (POC Duration  / 90 days whichever is less): 2021       Visit # Date Range Insurance Allowable Requires auth   34 2020-3/10/21 BMN    [x]no        []yes:           SUBJECTIVE:        Pain:  0/10 \"just stiffness\" Pt reports that she felt okay after last session. At this point she is still really struggling to do sit to stand from chairs at home which bothers her the most that this is still difficult    OBJECTIVE: See below     Observation:  Distal fibula posterior glide now WNL but proximal fibula ant and post glide still hypomobile.    Test measurements: See below    PT Practice Pattern:H  Co morbidities:1-2  SURgical R TKR 20  INITIAL VISIT 20 CURRENT VISIT 3/10/21   PAIN 0-10/10   0/10   FUNCTIONAL SCALE LEFS () 84%  (60/80) 25%   PROM KE -7 -12 -4 deg    KF 43 55 100 deg                               STRENGHT (MMT) Quad tone trace +/good -    KE       KF       HF       HAB                     RESTRICTIONS/PRECAUTIONS: OA; osteopenia     Exercises/Interventions:   HEP instruction was provided and handouts given to include:  Access Code: [email or web address]   Patient Portal: [email or web address]      Therapeutic Ex (63411) Reps/Sets/sec Notes/CUES      Recumbent bike 5' for ROM 1/2 rev   Incline stretch 5x30\" Focus form      30\"x3    Reformer press [id number]  2 red, 1 blue spring   Reformer heel raises on [id number] red, 1 blue   Focus on GS/QS and upright posture      VC  +HEP 3/8   X HEP 3/8   Required significant VC   prone HSC     Mini-squat 10x2 Tactile cueing for equal WB, flex through hip and knee with forward torso lean   Lateral cone step-over 10x2    Manual Intervention (28601)     IASTM quad, patella, incisions, ITBB, HS, soleus, gastroc, post tib x10'                       Fib mobs  and proximal, ant post; GIII  x5'              NMR re-education (91196)  CUES NEEDED      Biphasic NMES     LAQ and HSC alt  Step over cone and WB on LLE      1/2 foam roll to promote more use of Quad less HF      3 red springs, 1 blue   Focus on elongated LE         Focus on TKE in stance and KF in swing;  Improved upright posturing, able to increase mil safely with improved mechanics   Stair retraining  MC/VC for form, KE, co contraction at glutes and quads,    Step ups L on 4\"  Fwd  [id number] eaFatigued following  Focus on TKE, eccentric control; used TB to facilitate TKE    Therapeutic Activity (34457)                                         Therapeutic Exercise and NMR EXR  [x] (78203) Provided verbal/tactile cueing for activities related to strengthening, flexibility, endurance, ROM for improvements in LE, proximal hip, and core control with self care, mobility, lifting, ambulation.  [] (31933) Provided verbal/tactile cueing for activities related to improving balance, coordination, kinesthetic sense, posture, motor skill, proprioception  to assist with LE, proximal hip, and core control in self care, mobility, lifting, ambulation and eccentric single leg control. NMR and Therapeutic Activities:    [x] (68080 or 86479) Provided verbal/tactile cueing for activities related to improving balance, coordination, kinesthetic sense, posture, motor skill, proprioception and motor activation to allow for proper function of core, proximal hip and LE with self care and ADLs  [] (59915) Gait Re-education- Provided training and instruction to the patient for proper LE, core and proximal hip recruitment and positioning and eccentric body weight control with ambulation re-education including up and down stairs     Home Exercise Program:    [] (58657) Reviewed/Progressed HEP activities related to strengthening, flexibility, endurance, ROM of core, proximal hip and LE for functional self-care, mobility, lifting and ambulation/stair navigation   [] (72757)Reviewed/Progressed HEP activities related to improving balance, coordination, kinesthetic sense, posture, motor skill, proprioception of core, proximal hip and LE for self care, mobility, lifting, and ambulation/stair navigation      Manual Treatments:  PROM / STM / Oscillations-Mobs:  G-I, II, III, IV (PA's, Inf., Post.)  [x] (50251) Provided manual therapy to mobilize LE, proximal hip and/or LS spine soft tissue/joints for the purpose of modulating pain, promoting relaxation,  increasing ROM, reducing/eliminating soft tissue swelling/inflammation/restriction, improving soft tissue extensibility and allowing for proper ROM for normal function with self care, mobility, lifting and ambulation. Modalities:   [x] GAME READY (VASO)- for significant edema, swelling, pain control.    x10'     Charges:  Timed Code Treatment Minutes: 45   Total Treatment Minutes: 55         [] EVAL (LOW) 82356   [] EVAL (MOD) 15706  [] EVAL (HIGH) 96265   [] RE-EVAL     [x] HG(51741) x1    [] IONTO  [x] NMR (57624)  x1   [x] VASO  [x] Manual (99369) x 1   [] Other:  [] TA x      [] Mech Traction (50217)  [] ES(attended) (01487)      [] ES (un) (69682): NMES           GOALS:  Patient stated goal: be able to squat; bend knee and no pain   [x] Progressing: [] Met: [] Not Met: [] Adjusted    Therapist goals for Patient:   Short Term Goals: To be achieved in: 2 weeks  1. Independent in HEP and progression per patient tolerance, in order to prevent re-injury. [] Progressing: [x] Met: [] Not Met: [] Adjusted  2. Patient will have a decrease in pain to facilitate improvement in movement, function, and ADLs as indicated by Functional Deficits. [] Progressing: [x] Met: [] Not Met: [] Adjusted    Long Term Goals: To be achieved in: 8-10 weeks  1. Disability index score of 40% or less for the LEFS to assist with reaching prior level of function. [x] Progressing: [x] Met: [] Not Met: [] Adjusted  2. Patient will demonstrate increased AROM to R knee ext to -2 and flex to 110  to allow for proper joint functioning as indicated by patients Functional Deficits. [x] Progressing: [x] Met:50%  [] Not Met: [] Adjusted  3. Patient will demonstrate an increase in Strength to good proximal hip strength and control, within 5lb HHD in LE to allow for proper functional mobility as indicated by patients Functional Deficits. [x] Progressing: [] Met: [] Not Met: [] Adjusted  4. Patient will return to being able to ambulate with proper gait without an AD; ascend and descend stairs to basement with reciprocal gait  without increased symptoms or restriction. [x] Progressing: [] Met: [] Not Met: [] Adjusted      Overall Progression Towards Functional goals/ Treatment Progress Update:  [] Patient is progressing as expected towards functional goals listed. [x] Progression is slowed due to complexities/Impairments listed. [] Progression has been slowed due to co-morbidities.   [] Plan just implemented, too soon to assess goals progression <30days   [] Goals require adjustment due to lack of progress  [] Patient is not progressing as expected and requires additional follow up with physician  [] Other :     Prognosis for POC: [x] Good [x] Fair  [] Poor       Patient requires continued skilled intervention: [x] Yes  [] No    Treatment/Activity Tolerance:  [x] Patient able to complete treatment  [] Patient limited by fatigue  [] Patient limited by pain    [] Patient limited by other medical complications   [] Other:     ASSESSMENT:  Her ankle jt mobility and distal fib mobility were much improved today but still hypomobile at prox fib and knee. We were able to focus more on functional use of knee ROM and functional strength today, Pt need tactile cueing for accurate combined hip and knee flexion with squat but did better with use of quad for TKE in step-ups today. PLAN: Cont R knee ROM and strength and normalization of gait mechanics. Cont Pt 3x weekly for 2-4 weeks. (faxed Progress note from last visit to Dr. Kira Kevin for her f/u with him on Monday). [x] Continue per plan of care [] Alter current plan (see comments above)  [] Plan of care initiated [] Hold pending MD visit [] Discharge    Electronically signed by:  Alfonso Silverio, PT     Note: If patient does not return for scheduled/ recommended follow up visits, this note will serve as a discharge from care along with most recent update on progress.

## 2021-03-15 ENCOUNTER — HOSPITAL ENCOUNTER (OUTPATIENT)
Dept: PHYSICAL THERAPY | Age: 71
Setting detail: THERAPIES SERIES
Discharge: HOME | End: 2021-03-15
Payer: MEDICARE

## 2021-03-15 PROCEDURE — 97110 THERAPEUTIC EXERCISES: CPT

## 2021-03-15 NOTE — FLOWSHEET NOTE
[facility], [address]  Phone: [phone]  Fax [phone]        Physical Therapy Treatment Note/ Progress Report:           Date:  3/15/2021    Patient Name:  Ayden Sauceda    :  1950  MRN: [id number]  Restrictions/Precautions:    Medical/Treatment Diagnosis Information:  Diagnosis: s/p r TKR sx date 2020 M25.561; M17.11 MINH 20  Treatment Diagnosis: R knee pain A78.801  Insurance/Certification information:  PT Insurance Information: /"[employer]"  Physician Information:  Referring Practitioner: Dr. Flakito Lopez  Has the plan of care been signed (Y/N):        []  Yes  [x]  No     Date of Patient follow up with Physician:  3/15/21    Is this a Progress Report:     [x]  Yes  []  No        If Yes:  Date Range for reporting period:  Beginning 21  Ending :3/10/21    Progress report will be due (10 Rx or 30 days whichever is less): 2021 (done 21 visit 20) Due 3/11/21 visit 30 (done 3/10/21 visit 35)      Recertification will be due (POC Duration  / 90 days whichever is less): 2021       Visit # Date Range Insurance Allowable Requires auth   35 2020-3/10/21 BMN    [x]no        []yes:           SUBJECTIVE:        Pain:  0/10 \"just stiffness\" Pt had f/u from MD today and he felt everything was looking good and to continue with therapy until she and PT felt she was ready to be done. At this point she feels like she is about 75% improved enough to do all her ADLs. Pt says ankle has been feeling pretty good.      OBJECTIVE: See below     Observation:  Distal and proximal fibula posterior glide now WNL   Test measurements: See below     PT Practice Pattern:H  Co morbidities:1-2  SURgical R TKR 20  INITIAL VISIT 20 CURRENT VISIT 3/10/21   PAIN 0-10/10   0/10   FUNCTIONAL SCALE LEFS () 84%  (60/80) 25%   PROM KE -7 -12 -4 deg     43 55 100 deg STRENGHT (MMT) Quad tone  trace +/good -    KE       KF       HF       HAB                     RESTRICTIONS/PRECAUTIONS: OA; osteopenia     Exercises/Interventions:   HEP instruction was provided and handouts given to include:  Access Code: [email or web address]   Patient Portal: [employer]/      Therapeutic Ex (31951) Reps/Sets/sec Notes/CUES      Recumbent bike 5' for ROM 1/2 rev   Incline stretch 5x30\" Focus form      30\"x3    Reformer press [id number]  2 red, 1 blue spring   Reformer heel raises on [id number] red, 1 blue   Focus on GS/QS and upright posture   BRENT : abd 45# 15x2 R/L   VC  +HEP 3/8   X HEP 3/8   Required significant VC   prone HSC 30x    Mini-squat 10x2 Tactile cueing for equal WB, flex through hip and knee with forward torso lean   Lateral cone step-over 10x2    Manual Intervention (34576)     IASTM quad, patella, incisions, ITBB, HS, soleus, gastroc, post tib x10'                       Fib mobs  and proximal, ant post; GIII  x5'    Hamstring, ITB and prone quad strap stretching 2x30\" ea R         NMR re-education (46170)  CUES NEEDED      Biphasic NMES     LAQ and HSC alt  Step over cone and WB on LLE      1/2 foam roll to promote more use of Quad less HF      3 red springs, 1 blue   Focus on elongated LE         Focus on TKE in stance and KF in swing;  Improved upright posturing, able to increase mil safely with improved mechanics   Stair retraining  MC/VC for form, KE, co contraction at glutes and quads,    Step ups L on 6\"  Fwd  2x10 ea R/LFatigued following  Focus on TKE, eccentric control; used TB to facilitate TKE    Therapeutic Activity (18673)                                         Therapeutic Exercise and NMR EXR  [x] (76024) Provided verbal/tactile cueing for activities related to strengthening, flexibility, endurance, ROM for improvements in LE, proximal hip, and core control with self care, mobility, lifting, ambulation.  [] (34891) Provided verbal/tactile cueing for activities related to improving balance, coordination, kinesthetic sense, posture, motor skill, proprioception  to assist with LE, proximal hip, and core control in self care, mobility, lifting, ambulation and eccentric single leg control. NMR and Therapeutic Activities:    [x] (37308 or 85230) Provided verbal/tactile cueing for activities related to improving balance, coordination, kinesthetic sense, posture, motor skill, proprioception and motor activation to allow for proper function of core, proximal hip and LE with self care and ADLs  [] (20262) Gait Re-education- Provided training and instruction to the patient for proper LE, core and proximal hip recruitment and positioning and eccentric body weight control with ambulation re-education including up and down stairs     Home Exercise Program:    [] (50645) Reviewed/Progressed HEP activities related to strengthening, flexibility, endurance, ROM of core, proximal hip and LE for functional self-care, mobility, lifting and ambulation/stair navigation   [] (18881)Reviewed/Progressed HEP activities related to improving balance, coordination, kinesthetic sense, posture, motor skill, proprioception of core, proximal hip and LE for self care, mobility, lifting, and ambulation/stair navigation      Manual Treatments:  PROM / STM / Oscillations-Mobs:  G-I, II, III, IV (PA's, Inf., Post.)  [x] (77730) Provided manual therapy to mobilize LE, proximal hip and/or LS spine soft tissue/joints for the purpose of modulating pain, promoting relaxation,  increasing ROM, reducing/eliminating soft tissue swelling/inflammation/restriction, improving soft tissue extensibility and allowing for proper ROM for normal function with self care, mobility, lifting and ambulation. Modalities:   [] GAME READY (VASO)- for significant edema, swelling, pain control.   declined  Charges:  Timed Code Treatment Minutes: 45   Total Treatment Minutes: 45         [] EVAL (LOW) 31319   [] EVAL (MOD) 03895  [] EVAL (HIGH) 36460   [] RE-EVAL     [x] GK(06345) x 3    [] IONTO  [] NMR (65611)    [] VASO  [] Manual (94657) x   [] Other:  [] TA x      [] Mech Traction (13333)  [] ES(attended) (12060)      [] ES (un) (17346): NMES           GOALS:  Patient stated goal: be able to squat; bend knee and no pain   [x] Progressing: [] Met: [] Not Met: [] Adjusted    Therapist goals for Patient:   Short Term Goals: To be achieved in: 2 weeks  1. Independent in HEP and progression per patient tolerance, in order to prevent re-injury. [] Progressing: [x] Met: [] Not Met: [] Adjusted  2. Patient will have a decrease in pain to facilitate improvement in movement, function, and ADLs as indicated by Functional Deficits. [] Progressing: [x] Met: [] Not Met: [] Adjusted    Long Term Goals: To be achieved in: 8-10 weeks  1. Disability index score of 40% or less for the LEFS to assist with reaching prior level of function. [x] Progressing: [x] Met: [] Not Met: [] Adjusted  2. Patient will demonstrate increased AROM to R knee ext to -2 and flex to 110  to allow for proper joint functioning as indicated by patients Functional Deficits. [x] Progressing: [x] Met:50%  [] Not Met: [] Adjusted  3. Patient will demonstrate an increase in Strength to good proximal hip strength and control, within 5lb HHD in LE to allow for proper functional mobility as indicated by patients Functional Deficits. [x] Progressing: [] Met: [] Not Met: [] Adjusted  4. Patient will return to being able to ambulate with proper gait without an AD; ascend and descend stairs to basement with reciprocal gait  without increased symptoms or restriction. [x] Progressing: [] Met: [] Not Met: [] Adjusted      Overall Progression Towards Functional goals/ Treatment Progress Update:  [] Patient is progressing as expected towards functional goals listed. [x] Progression is slowed due to complexities/Impairments listed.   [] Progression has been slowed due to co-morbidities. [] Plan just implemented, too soon to assess goals progression <30days   [] Goals require adjustment due to lack of progress  [] Patient is not progressing as expected and requires additional follow up with physician  [] Other :     Prognosis for POC: [x] Good [x] Fair  [] Poor       Patient requires continued skilled intervention: [x] Yes  [] No    Treatment/Activity Tolerance:  [x] Patient able to complete treatment  [] Patient limited by fatigue  [] Patient limited by pain    [] Patient limited by other medical complications   [] Other:     ASSESSMENT:  Pt able to get to 100 deg knee flexion in PWB squat position today. She was also able to get a much improved TKE action with 6\" step up showing good functional progress. Her prox and distal fibula motion were WNL and pain free today. PLAN: Cont R knee ROM and strength and normalization of gait mechanics. Cont Pt 3x weekly for 2-4 weeks. [x] Continue per plan of care [] Alter current plan (see comments above)  [] Plan of care initiated [] Hold pending MD visit [] Discharge    Electronically signed by:  Jacinto Brannon PT     Note: If patient does not return for scheduled/ recommended follow up visits, this note will serve as a discharge from care along with most recent update on progress.

## 2021-03-17 ENCOUNTER — HOSPITAL ENCOUNTER (OUTPATIENT)
Dept: PHYSICAL THERAPY | Age: 71
Setting detail: THERAPIES SERIES
Discharge: HOME | End: 2021-03-17
Payer: MEDICARE

## 2021-03-17 PROCEDURE — 97110 THERAPEUTIC EXERCISES: CPT | Performed by: PHYSICAL THERAPIST

## 2021-03-17 PROCEDURE — 97140 MANUAL THERAPY 1/> REGIONS: CPT | Performed by: PHYSICAL THERAPIST

## 2021-03-17 PROCEDURE — 97112 NEUROMUSCULAR REEDUCATION: CPT | Performed by: PHYSICAL THERAPIST

## 2021-03-17 PROCEDURE — 97016 VASOPNEUMATIC DEVICE THERAPY: CPT | Performed by: PHYSICAL THERAPIST

## 2021-03-17 NOTE — FLOWSHEET NOTE
[id number]   Patient Portal: [employer]/      Therapeutic Ex (91616) Reps/Sets/sec Notes/CUES      Recumbent bike 5' for ROM 1/2 rev   Focus form                 standing BHR 20x 2 finger 2 hand support       VC  +HEP 3/8   X HEP 3/8   Pro slant clacaneal EV/forefoot DF/pronation 10x each lunge/10s each stride       Mini-squat ADD  Mini squat ABD with VL  Mini squat NV  NV  15x  Tactile cueing for equal WB, flex through hip and knee with forward torso lean      Manual Intervention (26699)     (prone) R hip glute release, pir release, quad stretch, ant hip mobs GIII, hip IR/ER, KE stretch x13'                                            NMR re-education (85079)  CUES NEEDED           1/2 foam roll to promote more use of Quad less HF   BRENT in RLE heel strike position for gait re training 45# 20x3\" VC   Leaning into runners stretch RLE back, KE and PF for push off training  2x10 MC   Modified tandem stance R/L with overhead retro ball reach 10x ea                             Therapeutic Activity (02744)                                         Therapeutic Exercise and NMR EXR  [x] (25999) Provided verbal/tactile cueing for activities related to strengthening, flexibility, endurance, ROM for improvements in LE, proximal hip, and core control with self care, mobility, lifting, ambulation.  [] (61655) Provided verbal/tactile cueing for activities related to improving balance, coordination, kinesthetic sense, posture, motor skill, proprioception  to assist with LE, proximal hip, and core control in self care, mobility, lifting, ambulation and eccentric single leg control.      NMR and Therapeutic Activities:    [x] (67468 or 91373) Provided verbal/tactile cueing for activities related to improving balance, coordination, kinesthetic sense, posture, motor skill, proprioception and motor activation to allow for proper function of core, proximal hip and LE with self care and ADLs  [] (78071) Gait Re-education- the rate of recovery.)                   [x]  The patient has a complexity identified by an ICD-10 code that has a direct and significant impact on the need for therapy. (Significantly impacts the rate of recovery and is associated with a primary condition.)         []  The patient has associated variables that influence the amount of treatment to include:  Social support, self-efficacy/motivation, prognosis, time since onset/acuity. []  The patient has generalized musculoskeletal conditions or a condition affecting multiple sites that will have a direct impact on the rate of recovery. []  The patient had a prior episode of outpatient therapy during this calendar year for a different condition. []  The patient has a mental or cognitive disorder in addition to the condition being treated that will have a direct and significant impact on the rate of recovery. GOALS:  Patient stated goal: be able to squat; bend knee and no pain   [x] Progressing: [] Met: [] Not Met: [] Adjusted    Therapist goals for Patient:   Short Term Goals: To be achieved in: 2 weeks  1. Independent in HEP and progression per patient tolerance, in order to prevent re-injury. [] Progressing: [x] Met: [] Not Met: [] Adjusted  2. Patient will have a decrease in pain to facilitate improvement in movement, function, and ADLs as indicated by Functional Deficits. [] Progressing: [x] Met: [] Not Met: [] Adjusted    Long Term Goals: To be achieved in: 8-10 weeks  1. Disability index score of 40% or less for the LEFS to assist with reaching prior level of function. [x] Progressing: [x] Met: [] Not Met: [] Adjusted  2. Patient will demonstrate increased AROM to R knee ext to -2 and flex to 110  to allow for proper joint functioning as indicated by patients Functional Deficits. [x] Progressing: [x] Met:50%  [] Not Met: [] Adjusted  3.  Patient will demonstrate an increase in Strength to good proximal hip strength and control, within 5lb HHD in LE to allow for proper functional mobility as indicated by patients Functional Deficits. [x] Progressing: [] Met: [] Not Met: [] Adjusted  4. Patient will return to being able to ambulate with proper gait without an AD; ascend and descend stairs to basement with reciprocal gait  without increased symptoms or restriction. [x] Progressing: [] Met: [] Not Met: [] Adjusted      Overall Progression Towards Functional goals/ Treatment Progress Update:  [] Patient is progressing as expected towards functional goals listed. [x] Progression is slowed due to complexities/Impairments listed. [] Progression has been slowed due to co-morbidities. [] Plan just implemented, too soon to assess goals progression <30days   [] Goals require adjustment due to lack of progress  [] Patient is not progressing as expected and requires additional follow up with physician  [] Other :     Prognosis for POC: [x] Good [x] Fair  [] Poor       Patient requires continued skilled intervention: [x] Yes  [] No    Treatment/Activity Tolerance:  [x] Patient able to complete treatment  [] Patient limited by fatigue  [] Patient limited by pain    [] Patient limited by other medical complications   [] Other:     ASSESSMENT:       Pt demonstrated the ability to keep KE with push off in gait challenges today for the first time in the clinic. She is having difficulty with gait mechanics still especially co contraction of quad,glute, and PF. She is improving each visit. We will re evaluate her progress end of next week and determine course of next 2-4 weeks. PLAN: Cont R knee ROM and strength and normalization of gait mechanics. Cont Pt 3x weekly for 2 weeks then consider decreasing frequency. Cont gait mechanics, foot knee and hip.   .   [x] Continue per plan of care [] Alter current plan (see comments above)  [] Plan of care initiated [] Hold pending MD visit [] Discharge    Electronically signed by:  Catherine Lechuga, PT [id number]    Note: If patient does not return for scheduled/ recommended follow up visits, this note will serve as a discharge from care along with most recent update on progress.

## 2021-03-19 ENCOUNTER — HOSPITAL ENCOUNTER (OUTPATIENT)
Dept: PHYSICAL THERAPY | Age: 71
Setting detail: THERAPIES SERIES
Discharge: HOME | End: 2021-03-19
Payer: MEDICARE

## 2021-03-19 PROCEDURE — 97016 VASOPNEUMATIC DEVICE THERAPY: CPT

## 2021-03-19 PROCEDURE — 97110 THERAPEUTIC EXERCISES: CPT

## 2021-03-19 PROCEDURE — 97140 MANUAL THERAPY 1/> REGIONS: CPT

## 2021-03-19 PROCEDURE — 97112 NEUROMUSCULAR REEDUCATION: CPT

## 2021-03-19 NOTE — FLOWSHEET NOTE
[facility], [address]  Phone: [phone]  Fax [phone]        Physical Therapy Treatment Note/ Progress Report:           Date:  3/19/2021    Patient Name:  Dereck Iniguez    :  1950  MRN: [id number]  Restrictions/Precautions:    Medical/Treatment Diagnosis Information:  Diagnosis: s/p r TKR sx date 2020 M25.561; M17.11 MINH 20  Treatment Diagnosis: R knee pain O02.509  Insurance/Certification information:  PT Insurance Information: /humana  Physician Information:  Referring Practitioner: Dr. Tinsley Bi  Has the plan of care been signed (Y/N):        []  Yes  [x]  No     Date of Patient follow up with Physician:  3/15/21    Is this a Progress Report:     [x]  Yes  []  No        If Yes:  Date Range for reporting period:  Beginning 21  Ending :3/10/21    Progress report will be due (10 Rx or 30 days whichever is less): 2021 (done 21 visit 20) Due 3/11/21 visit 30 (done 3/10/21 visit 35)      Recertification will be due (POC Duration  / 90 days whichever is less): 2021       Visit # Date Range Insurance Allowable Requires auth   37 2020-3/10/21 BMN    [x]no        []yes:           SUBJECTIVE:    Pt reports some soreness yesterday but that has been typical after her PT appts. She has no other issues to note.      Pain:    0/10    OBJECTIVE: See below     Observation:     Test measurements: See below     PT Practice Pattern:H  Co morbidities:1-2  SURgical R TKR 20  INITIAL VISIT 20 CURRENT VISIT 3/10/21   PAIN 0-10/10   0/10   FUNCTIONAL SCALE LEFS () 84%  (60/80) 25%   PROM KE -7 -12 -4 deg    KF 43 55 100 deg                               STRENGHT (MMT) Quad tone  trace +/good -    KE       KF       HF       HAB                     RESTRICTIONS/PRECAUTIONS: OA; osteopenia     Exercises/Interventions:   HEP instruction was provided and handouts given to include:  Access Code: [email or web address]   Patient Portal: [email or web address]/      Therapeutic Ex (55579) Reps/Sets/sec Notes/CUES   april stretch KF/KE stretch 1'x4 ea    Recumbent bike 5' for ROM 1/2 rev   Focus form       HF stretch EOP w strap 30\"x3    Standing TKE YTB RLE3\"Hx15  For quad and glut coactivation       standing BHR 20x 2 finger 2 hand support       VC  +HEP 3/8   X HEP 3/8   Pro slant clacaneal EV/forefoot DF/pronation 10x each lunge/10s each stride       Mini-squat ADD  Mini squat ABD with OVL  Mini squat NV  2x15  15x  Tactile cueing for equal WB, flex through hip and knee with forward torso lean      Manual Intervention (81674)        STM R quads, PFJ mobs, ant hip mobs, KF stretch in prone with HE x10'                                       NMR re-education (24405)  CUES NEEDED           1/2 foam roll to promote more use of Quad less HF    45# 20x3\" VC   Leaning into runners stretch RLE back, KE and PF for push off training  15x  MC; performed against the wall to mimic push off in R LE terminal stance   Modified tandem stance R/L with overhead retro ball reach 10x ea         FSU' with resisted HE YTB 2x10                    Therapeutic Activity (21801)                                         Therapeutic Exercise and NMR EXR  [x] (99044) Provided verbal/tactile cueing for activities related to strengthening, flexibility, endurance, ROM for improvements in LE, proximal hip, and core control with self care, mobility, lifting, ambulation.  [] (34310) Provided verbal/tactile cueing for activities related to improving balance, coordination, kinesthetic sense, posture, motor skill, proprioception  to assist with LE, proximal hip, and core control in self care, mobility, lifting, ambulation and eccentric single leg control.      NMR and Therapeutic Activities:    [x] (56342 or 57709) Provided verbal/tactile cueing for activities related to improving balance, coordination, kinesthetic sense, posture, motor skill, proprioception and motor activation to allow for proper function of core, proximal hip and LE with self care and ADLs  [] (40798) Gait Re-education- Provided training and instruction to the patient for proper LE, core and proximal hip recruitment and positioning and eccentric body weight control with ambulation re-education including up and down stairs     Home Exercise Program:    [] (07165) Reviewed/Progressed HEP activities related to strengthening, flexibility, endurance, ROM of core, proximal hip and LE for functional self-care, mobility, lifting and ambulation/stair navigation   [] (85602)Reviewed/Progressed HEP activities related to improving balance, coordination, kinesthetic sense, posture, motor skill, proprioception of core, proximal hip and LE for self care, mobility, lifting, and ambulation/stair navigation      Manual Treatments:  PROM / STM / Oscillations-Mobs:  G-I, II, III, IV (PA's, Inf., Post.)  [x] (68593) Provided manual therapy to mobilize LE, proximal hip and/or LS spine soft tissue/joints for the purpose of modulating pain, promoting relaxation,  increasing ROM, reducing/eliminating soft tissue swelling/inflammation/restriction, improving soft tissue extensibility and allowing for proper ROM for normal function with self care, mobility, lifting and ambulation. Modalities:   [x] GAME READY (VASO)- for significant edema, swelling, pain control. x10'  Charges:  Timed Code Treatment Minutes: 42   Total Treatment Minutes: 52         [] EVAL (LOW) 36138   [] EVAL (MOD) 76458  [] EVAL (HIGH) 57346   [] RE-EVAL     [x] UL(37617) x 1    [] IONTO  [x] NMR (30866) x1   [x] VASO  [x] Manual (28747) x x1  [] Other:  [] TA x      [] Mech Traction (99855)  [] ES(attended) (39192)      [] ES (un) (87078):  NMES           GOALS:  Patient stated goal: be able to squat; bend knee and no pain   [x] Progressing: [] Met: [] Not Met: [] Adjusted    Therapist goals for Patient:   Short Term Goals: To be achieved in: 2 weeks  1. Independent in HEP and progression per patient tolerance, in order to prevent re-injury. [] Progressing: [x] Met: [] Not Met: [] Adjusted  2. Patient will have a decrease in pain to facilitate improvement in movement, function, and ADLs as indicated by Functional Deficits. [] Progressing: [x] Met: [] Not Met: [] Adjusted    Long Term Goals: To be achieved in: 8-10 weeks  1. Disability index score of 40% or less for the LEFS to assist with reaching prior level of function. [x] Progressing: [x] Met: [] Not Met: [] Adjusted  2. Patient will demonstrate increased AROM to R knee ext to -2 and flex to 110  to allow for proper joint functioning as indicated by patients Functional Deficits. [x] Progressing: [x] Met:50%  [] Not Met: [] Adjusted  3. Patient will demonstrate an increase in Strength to good proximal hip strength and control, within 5lb HHD in LE to allow for proper functional mobility as indicated by patients Functional Deficits. [x] Progressing: [] Met: [] Not Met: [] Adjusted  4. Patient will return to being able to ambulate with proper gait without an AD; ascend and descend stairs to basement with reciprocal gait  without increased symptoms or restriction. [x] Progressing: [] Met: [] Not Met: [] Adjusted      Overall Progression Towards Functional goals/ Treatment Progress Update:  [] Patient is progressing as expected towards functional goals listed. [x] Progression is slowed due to complexities/Impairments listed. [] Progression has been slowed due to co-morbidities.   [] Plan just implemented, too soon to assess goals progression <30days   [] Goals require adjustment due to lack of progress  [] Patient is not progressing as expected and requires additional follow up with physician  [] Other :     Prognosis for POC: [x] Good [x] Fair  [] Poor       Patient requires continued skilled intervention: [x] Yes  [] No    Treatment/Activity Tolerance:  [x] Patient rate of recovery. .   [x] Continue per plan of care [] Alter current plan (see comments above)  [] Plan of care initiated [] Hold pending MD visit [] Discharge    Electronically signed by:  Leyla Houston, PT , DPT, [facility] [id number]      Note: If patient does not return for scheduled/ recommended follow up visits, this note will serve as a discharge from care along with most recent update on progress.

## 2021-03-22 ENCOUNTER — HOSPITAL ENCOUNTER (OUTPATIENT)
Dept: PHYSICAL THERAPY | Age: 71
Setting detail: THERAPIES SERIES
Discharge: HOME | End: 2021-03-22
Payer: MEDICARE

## 2021-03-22 PROCEDURE — 97110 THERAPEUTIC EXERCISES: CPT | Performed by: PHYSICAL THERAPIST

## 2021-03-22 PROCEDURE — 97016 VASOPNEUMATIC DEVICE THERAPY: CPT | Performed by: PHYSICAL THERAPIST

## 2021-03-22 PROCEDURE — 97140 MANUAL THERAPY 1/> REGIONS: CPT | Performed by: PHYSICAL THERAPIST

## 2021-03-22 PROCEDURE — 97112 NEUROMUSCULAR REEDUCATION: CPT | Performed by: PHYSICAL THERAPIST

## 2021-03-22 NOTE — FLOWSHEET NOTE
[facility], [address]  Phone: [phone]  Fax [phone]        Physical Therapy Treatment Note/ Progress Report:           Date:  3/22/2021    Patient Name:  Dorothea Hoyos    :  1950  MRN: [id number]  Restrictions/Precautions:    Medical/Treatment Diagnosis Information:  Diagnosis: s/p r TKR sx date 2020 M25.561; M17.11 MINH 20  Treatment Diagnosis: R knee pain W00.320  Insurance/Certification information:  PT Insurance Information: /humana  Physician Information:  Referring Practitioner: Dr. Arianne Odonnell  Has the plan of care been signed (Y/N):        []  Yes  [x]  No     Date of Patient follow up with Physician:  3/15/21    Is this a Progress Report:     []  Yes  [x]  No        If Yes:  Date Range for reporting period:  Beginning 3/10/21  Ending :    Progress report will be due (10 Rx or 30 days whichever is less): 2021 (done 21 visit 20) Due 3/11/21 visit 30 (done 3/10/21 visit 35) Due visit 43 79      Recertification will be due (POC Duration  / 90 days whichever is less): 2021       Visit # Date Range Insurance Allowable Requires auth   38 2020- BMN    [x]no        []yes:           SUBJECTIVE:    Pt reports she did a lot of going out and walking shopping this weekend and got tight and fatigued but overall did well.     Pain:    0/10    OBJECTIVE: See below     Observation:  HS and patellar mobility tight this visit (more then usual)   Test measurements: See below     PT Practice Pattern:H  Co morbidities:1-2  SURgical R TKR 20  INITIAL VISIT 20 CURRENT VISIT 3/10/21   PAIN 0-10/10   0/10   FUNCTIONAL SCALE LEFS () 84%  (60/80) 25%   PROM KE -7 -12 -4 deg    KF 43 55 100 deg                               STRENGHT (MMT) Quad tone  trace +/good -    KE       KF       HF       HAB                     RESTRICTIONS/PRECAUTIONS: OA; osteopenia Exercises/Interventions:   HEP instruction was provided and handouts given to include:  Access Code: [email or web address]   Patient Portal: [email or web address]/      Therapeutic Ex (25144) Reps/Sets/sec Notes/CUES   april stretch KF/KE stretch R/L 5x30\" ea    Recumbent bike 5' for ROM 1/2 rev   Focus form       HF stretch EOP w strap 30\"x3    Standing TKE YTB RLE3\"Hx15  For quad and glut coactivation    Incline stretch soleus/proslant stretch step through gastroc 3x15\" ea    standing BHR 20x 2 finger 2 hand support       VC  +HEP 3/8   X HEP 3/8         Mini-squat ADD w ball  Mini squat ABD with OVL  Mini squat 15x  15x  15x  Tactile cueing for equal WB, flex through hip and knee with forward torso lean      Manual Intervention (25999)        STM R quads, PFJ mobs, ant hip mobs, KE stretch x8'                                       NMR re-education (27894)  CUES NEEDED           1/2 foam roll to promote more use of Quad less HF   BRENT in RLE heel strike position for gait re training 45# 30x5\" VC   Leaning into runners stretch RLE back, KE and PF for push off training  15x  MC; performed against the wall to mimic push off in R LE terminal stance   Modified tandem stance R/L with overhead retro ball reach 10x ea    Reformer [place] LLE RLE EXT  BHR 1R1B  20x3\" ea                        Therapeutic Activity (36012)                                         Therapeutic Exercise and NMR EXR  [x] (53695) Provided verbal/tactile cueing for activities related to strengthening, flexibility, endurance, ROM for improvements in LE, proximal hip, and core control with self care, mobility, lifting, ambulation.  [] (35493) Provided verbal/tactile cueing for activities related to improving balance, coordination, kinesthetic sense, posture, motor skill, proprioception  to assist with LE, proximal hip, and core control in self care, mobility, lifting, ambulation and eccentric single leg control.      NMR and Therapeutic Activities:    [x] (63727 or ) Provided verbal/tactile cueing for activities related to improving balance, coordination, kinesthetic sense, posture, motor skill, proprioception and motor activation to allow for proper function of core, proximal hip and LE with self care and ADLs  [] (33809) Gait Re-education- Provided training and instruction to the patient for proper LE, core and proximal hip recruitment and positioning and eccentric body weight control with ambulation re-education including up and down stairs     Home Exercise Program:    [] (87616) Reviewed/Progressed HEP activities related to strengthening, flexibility, endurance, ROM of core, proximal hip and LE for functional self-care, mobility, lifting and ambulation/stair navigation   [] (97083)Reviewed/Progressed HEP activities related to improving balance, coordination, kinesthetic sense, posture, motor skill, proprioception of core, proximal hip and LE for self care, mobility, lifting, and ambulation/stair navigation      Manual Treatments:  PROM / STM / Oscillations-Mobs:  G-I, II, III, IV (PA's, Inf., Post.)  [x] (11034) Provided manual therapy to mobilize LE, proximal hip and/or LS spine soft tissue/joints for the purpose of modulating pain, promoting relaxation,  increasing ROM, reducing/eliminating soft tissue swelling/inflammation/restriction, improving soft tissue extensibility and allowing for proper ROM for normal function with self care, mobility, lifting and ambulation. Modalities:   [x] GAME READY (VASO)- for significant edema, swelling, pain control. x10'    Charges:  Timed Code Treatment Minutes: 45   Total Treatment Minutes: 60         [] EVAL (LOW) 16207   [] EVAL (MOD) 33094  [] EVAL (HIGH) 81720   [] RE-EVAL     [x] ZQ(38053) x 1    [] IONTO  [x] NMR (72057) x1   [x] VASO  [x] Manual (45630) x x1  [] Other:  [] TA x      [] Mech Traction (60431)  [] ES(attended) (18468)      [] ES (un) (97380):  NMES           GOALS:  Patient stated goal: be able to squat; bend knee and no pain   [x] Progressing: [] Met: [] Not Met: [] Adjusted    Therapist goals for Patient:   Short Term Goals: To be achieved in: 2 weeks  1. Independent in HEP and progression per patient tolerance, in order to prevent re-injury. [] Progressing: [x] Met: [] Not Met: [] Adjusted  2. Patient will have a decrease in pain to facilitate improvement in movement, function, and ADLs as indicated by Functional Deficits. [] Progressing: [x] Met: [] Not Met: [] Adjusted    Long Term Goals: To be achieved in: 8-10 weeks  1. Disability index score of 40% or less for the LEFS to assist with reaching prior level of function. [x] Progressing: [x] Met: [] Not Met: [] Adjusted  2. Patient will demonstrate increased AROM to R knee ext to -2 and flex to 110  to allow for proper joint functioning as indicated by patients Functional Deficits. [x] Progressing: [x] Met:50%  [] Not Met: [] Adjusted  3. Patient will demonstrate an increase in Strength to good proximal hip strength and control, within 5lb HHD in LE to allow for proper functional mobility as indicated by patients Functional Deficits. [x] Progressing: [] Met: [] Not Met: [] Adjusted  4. Patient will return to being able to ambulate with proper gait without an AD; ascend and descend stairs to basement with reciprocal gait  without increased symptoms or restriction. [x] Progressing: [] Met: [] Not Met: [] Adjusted      Overall Progression Towards Functional goals/ Treatment Progress Update:  [] Patient is progressing as expected towards functional goals listed. [x] Progression is slowed due to complexities/Impairments listed. [] Progression has been slowed due to co-morbidities.   [] Plan just implemented, too soon to assess goals progression <30days   [] Goals require adjustment due to lack of progress  [] Patient is not progressing as expected and requires additional follow up with physician  [] Other :     Prognosis for POC: [x] Good [x] Fair  [] Poor       Patient requires continued skilled intervention: [x] Yes  [] No    Treatment/Activity Tolerance:  [x] Patient able to complete treatment  [] Patient limited by fatigue  [] Patient limited by pain    [] Patient limited by other medical complications   [] Other:     ASSESSMENT:       Pt did well this session with focus on functional strengthening and gait retraining ex. She has improved KE during stance phase end of session today but continued with overall difficulty with gait mechanics still especially co contraction of quad,glute, and PF. Fatigued this visit post therex. She is improving each visit. We will re evaluate her progress end of next week and determine course of next 2-4 weeks. PLAN: Cont R knee ROM and strength and normalization of gait mechanics. Cont Pt 3x weekly for 2 weeks then consider decreasing frequency. Cont gait mechanics, foot knee and hip. MEDICARE CAP EXCEPTION DOCUMENTATION      I certify that this patient meets one of the below criteria necessary for becoming an exception to the Medicare cap on therapy services:    [x]  The patient has a condition identified by an ICD-10 code that has a direct and significant impact on the need for therapy. (Significantly impacts the rate of recovery.)                   [x]  The patient has a complexity identified by an ICD-10 code that has a direct and significant impact on the need for therapy. (Significantly impacts the rate of recovery and is associated with a primary condition.)         []  The patient has associated variables that influence the amount of treatment to include:  Social support, self-efficacy/motivation, prognosis, time since onset/acuity. []  The patient has generalized musculoskeletal conditions or a condition affecting multiple sites that will have a direct impact on the rate of recovery.     []  The patient had a prior episode of outpatient therapy during this calendar year for a different condition. []  The patient has a mental or cognitive disorder in addition to the condition being treated that will have a direct and significant impact on the rate of recovery. .   [x] Continue per plan of care [] Alter current plan (see comments above)  [] Plan of care initiated [] Hold pending MD visit [] Discharge    Electronically signed by:  Calin Hawley, PT , [id number]      Note: If patient does not return for scheduled/ recommended follow up visits, this note will serve as a discharge from care along with most recent update on progress.

## 2021-03-24 ENCOUNTER — HOSPITAL ENCOUNTER (OUTPATIENT)
Dept: PHYSICAL THERAPY | Age: 71
Setting detail: THERAPIES SERIES
Discharge: HOME | End: 2021-03-24
Payer: MEDICARE

## 2021-03-24 ENCOUNTER — IMMUNIZATION (OUTPATIENT)
Dept: PRIMARY CARE CLINIC | Age: 71
End: 2021-03-24
Payer: MEDICARE

## 2021-03-24 PROCEDURE — 97140 MANUAL THERAPY 1/> REGIONS: CPT | Performed by: PHYSICAL THERAPIST

## 2021-03-24 PROCEDURE — 91300 COVID-19, PFIZER VACCINE 30MCG/0.3ML DOSE: CPT | Performed by: FAMILY MEDICINE

## 2021-03-24 PROCEDURE — 97110 THERAPEUTIC EXERCISES: CPT | Performed by: PHYSICAL THERAPIST

## 2021-03-24 PROCEDURE — 97112 NEUROMUSCULAR REEDUCATION: CPT | Performed by: PHYSICAL THERAPIST

## 2021-03-24 PROCEDURE — 0001A PR IMM ADMN SARSCOV2 30MCG/0.3ML DIL RECON 1ST DOSE: CPT | Performed by: FAMILY MEDICINE

## 2021-03-24 NOTE — FLOWSHEET NOTE
[facility], [address]  Phone: [phone]  Fax [phone]        Physical Therapy Treatment Note/ Progress Report:           Date:  3/24/2021    Patient Name:  Danya Ramsay    :  1950  MRN: [id number]  Restrictions/Precautions:    Medical/Treatment Diagnosis Information:  Diagnosis: s/p r TKR sx date 2020 M25.561; M17.11 MINH 20  Treatment Diagnosis: R knee pain A93.445  Insurance/Certification information:  PT Insurance Information: /humana  Physician Information:  Referring Practitioner: Dr. Lulu Cueto  Has the plan of care been signed (Y/N):        []  Yes  [x]  No     Date of Patient follow up with Physician:  3/15/21    Is this a Progress Report:     []  Yes  [x]  No        If Yes:  Date Range for reporting period:  Beginning 3/10/21  Ending :    Progress report will be due (10 Rx or 30 days whichever is less): 2021 (done 21 visit 20) Due 3/11/21 visit 30 (done 3/10/21 visit 35) Due visit 43       Recertification will be due (POC Duration  / 90 days whichever is less): 2021       Visit # Date Range Insurance Allowable Requires auth   39 2020- BMN    [x]no        []yes:           SUBJECTIVE:        Pain:    0/10    OBJECTIVE: See below     Observation:  HS and patellar mobility tight this visit (more then usual)   Test measurements: See below     PT Practice Pattern:H  Co morbidities:1-2  SURgical R TKR 20  INITIAL VISIT 20 CURRENT VISIT 3/24/21   PAIN 0-10/10   0/10   FUNCTIONAL SCALE LEFS () 84%  (60/80) 25%   PROM KE -7 -12 -5    KF 43 55 102                               STRENGHT (MMT) Quad tone  trace +/good -    KE   5/5    KF   5/5    HF   5/5    HAB   5/5                  RESTRICTIONS/PRECAUTIONS: OA; osteopenia     Exercises/Interventions:   HEP instruction was provided and handouts given to include:  Access Code: [email or web address] Patient Portal: [email or web address]      Therapeutic Ex (81111) Reps/Sets/sec Notes/CUES   EZ stretch  KE 4'  KF 4'                        april stretch KF/KE stretch R/L 5x30\" ea    Recumbent bike 5' for ROM 1/2 rev   Incline stretch 5x30\" Focus form          For quad and glut coactivation       2 finger 2 hand support       VC  +HEP 3/8   X HEP 3/8         Tactile cueing for equal WB, flex through hip and knee with forward torso lean      Manual Intervention (66660)        IASTM R quads, knee, HS/PFJ mobs/ KE stretch x15'                             reassessment 6'         NMR re-education (54994)  CUES NEEDED      Biphasic NMES    QS/HSS alt for contract relax   10'  10\"/10\"  2.0 ramp     1/2 foam roll to promote more use of Quad less HF   VC   MC; performed against the wall to mimic push off in R LE terminal stance                           Therapeutic Activity (50181)                                         Therapeutic Exercise and NMR EXR  [x] (88398) Provided verbal/tactile cueing for activities related to strengthening, flexibility, endurance, ROM for improvements in LE, proximal hip, and core control with self care, mobility, lifting, ambulation.  [] (34432) Provided verbal/tactile cueing for activities related to improving balance, coordination, kinesthetic sense, posture, motor skill, proprioception  to assist with LE, proximal hip, and core control in self care, mobility, lifting, ambulation and eccentric single leg control.      NMR and Therapeutic Activities:    [x] (95666 or 11140) Provided verbal/tactile cueing for activities related to improving balance, coordination, kinesthetic sense, posture, motor skill, proprioception and motor activation to allow for proper function of core, proximal hip and LE with self care and ADLs  [] (33113) Gait Re-education- Provided training and instruction to the patient for proper LE, core and proximal hip recruitment and positioning and eccentric body weight control with ambulation re-education including up and down stairs     Home Exercise Program:    [] (80734) Reviewed/Progressed HEP activities related to strengthening, flexibility, endurance, ROM of core, proximal hip and LE for functional self-care, mobility, lifting and ambulation/stair navigation   [] (46271)Reviewed/Progressed HEP activities related to improving balance, coordination, kinesthetic sense, posture, motor skill, proprioception of core, proximal hip and LE for self care, mobility, lifting, and ambulation/stair navigation      Manual Treatments:  PROM / STM / Oscillations-Mobs:  G-I, II, III, IV (PA's, Inf., Post.)  [x] (66073) Provided manual therapy to mobilize LE, proximal hip and/or LS spine soft tissue/joints for the purpose of modulating pain, promoting relaxation,  increasing ROM, reducing/eliminating soft tissue swelling/inflammation/restriction, improving soft tissue extensibility and allowing for proper ROM for normal function with self care, mobility, lifting and ambulation. Modalities:   [x] GAME READY (VASO)- for significant edema, swelling, pain control. x10'    Charges:  Timed Code Treatment Minutes: 55   Total Treatment Minutes: 55         [] EVAL (LOW) 03891   [] EVAL (MOD) 34328  [] EVAL (HIGH) 27174   [] RE-EVAL     [x] IW(45964) x 2   [] IONTO  [x] NMR (06221) x1   [] VASO  [x] Manual (24853) x x1  [] Other:  [] TA x      [] Mech Traction (84266)  [] ES(attended) (29267)      [] ES (un) (27156): NMES           GOALS:  Patient stated goal: be able to squat; bend knee and no pain   [x] Progressing: [] Met: [] Not Met: [] Adjusted    Therapist goals for Patient:   Short Term Goals: To be achieved in: 2 weeks  1. Independent in HEP and progression per patient tolerance, in order to prevent re-injury. [] Progressing: [x] Met: [] Not Met: [] Adjusted  2.  Patient will have a decrease in pain to facilitate improvement in movement, function, and ADLs as indicated by Functional Deficits. [] Progressing: [x] Met: [] Not Met: [] Adjusted    Long Term Goals: To be achieved in: 8-10 weeks  1. Disability index score of 40% or less for the LEFS to assist with reaching prior level of function. [x] Progressing: [x] Met: [] Not Met: [] Adjusted  2. Patient will demonstrate increased AROM to R knee ext to -2 and flex to 110  to allow for proper joint functioning as indicated by patients Functional Deficits. [x] Progressing: [x] Met:50%  [] Not Met: [] Adjusted  3. Patient will demonstrate an increase in Strength to good proximal hip strength and control, within 5lb HHD in LE to allow for proper functional mobility as indicated by patients Functional Deficits. [x] Progressing: [] Met: [] Not Met: [] Adjusted  4. Patient will return to being able to ambulate with proper gait without an AD; ascend and descend stairs to basement with reciprocal gait  without increased symptoms or restriction. [x] Progressing: [] Met: [] Not Met: [] Adjusted      Overall Progression Towards Functional goals/ Treatment Progress Update:  [] Patient is progressing as expected towards functional goals listed. [x] Progression is slowed due to complexities/Impairments listed. [] Progression has been slowed due to co-morbidities. [] Plan just implemented, too soon to assess goals progression <30days   [] Goals require adjustment due to lack of progress  [] Patient is not progressing as expected and requires additional follow up with physician  [] Other :     Prognosis for POC: [x] Good [x] Fair  [] Poor       Patient requires continued skilled intervention: [x] Yes  [] No    Treatment/Activity Tolerance:  [x] Patient able to complete treatment  [] Patient limited by fatigue  [] Patient limited by pain    [] Patient limited by other medical complications   [] Other:     ASSESSMENT:        This session was focused on ROM and quad activity as pt is noticing that we have not worked on it for a week or two. .  A break

## 2021-03-26 ENCOUNTER — HOSPITAL ENCOUNTER (OUTPATIENT)
Dept: PHYSICAL THERAPY | Age: 71
Setting detail: THERAPIES SERIES
Discharge: HOME | End: 2021-03-26
Payer: MEDICARE

## 2021-03-26 PROCEDURE — 97110 THERAPEUTIC EXERCISES: CPT

## 2021-03-26 PROCEDURE — 97140 MANUAL THERAPY 1/> REGIONS: CPT

## 2021-03-26 NOTE — FLOWSHEET NOTE
[facility],  [address]  Phone: [phone]  Fax [phone]        Physical Therapy Treatment Note/ Progress Report:           Date:  3/26/2021    Patient Name:  Afia Morgan    :  1950  MRN: [id number]  Restrictions/Precautions:    Medical/Treatment Diagnosis Information:  Diagnosis: s/p r TKR sx date 2020 M25.561; M17.11 MINH 20  Treatment Diagnosis: R knee pain E05.667  Insurance/Certification information:  PT Insurance Information: /humana  Physician Information:  Referring Practitioner: Dr. Livia Westfall  Has the plan of care been signed (Y/N):        []  Yes  [x]  No     Date of Patient follow up with Physician:  3/15/21    Is this a Progress Report:     []  Yes  [x]  No        If Yes:  Date Range for reporting period:  Beginning 3/10/21  Ending :    Progress report will be due (10 Rx or 30 days whichever is less): 2021 (done 21 visit 20) Due 3/11/21 visit 30 (done 3/10/21 visit 35) Due visit 43       Recertification will be due (POC Duration  / 90 days whichever is less): 2021       Visit # Date Range Insurance Allowable Requires auth   40 2020- BMN    [x]no        []yes:           SUBJECTIVE:  Pt was a little sore from last session working on the ROM a lot but recovered by the next day. She finds herself being less dependent on her cane at home and is considering using her cnae for only when she goes out into community ambulation.      Pain:    2/10    OBJECTIVE: See below     Observation:  HS and patellar mobility tight this visit (more then usual)   Test measurements: See below     PT Practice Pattern:H  Co morbidities:1-2  SURgical R TKR 20  INITIAL VISIT 12/7/20  12/16/20  3/24/21 3/26/21   PAIN 0-10/10   0/10    FUNCTIONAL SCALE LEFS () 84%  (60/80) 25%    PROM KE -7 -12 -5 -4     KF 43 55 102 110                                   STRENGHT (DARINEL) Quad tone  trace +/good -     KE   5/5     KF   5/5     HF   5/5     HAB   5/5                  RESTRICTIONS/PRECAUTIONS: OA; osteopenia     Exercises/Interventions:   HEP instruction was provided and handouts given to include:  Access Code: [email or web address]   Patient Portal: [email or web address]/      Therapeutic Ex (02134) Reps/Sets/sec Notes/CUES   EZ stretch      Supine wall heel slides flexion (17517) x10x10\" Therapist overpress; achieved 110 deg   Supine wall heel slides extension (36905) x10x10\" Therapist overpress   Heel prop (61651 5lbs 2 min  Man overpress 90\"         april stretch KF/KE stretch R/L     Recumbent bike 5' for ROM 1/2 rev   Incline stretch 2x30\" Focus form          For quad and glut coactivation       2 finger 2 hand support       VC  +HEP 3/8   X HEP 3/8         Tactile cueing for equal WB, flex through hip and knee with forward torso lean   Squat with UE assist to \"sit deep\" 2x10    Standing HS curl 3# 2x10 R    Step up  6\" 2x10       Manual Intervention (13206)        IASTM R quads, knee, HS/PFJ mobs/ KE stretch                             reassessment         NMR re-education (16066)  CUES NEEDED      Biphasic NMES    QS/HSS alt for contract relax        1/2 foam roll to promote more use of Quad less HF   VC   MC; performed against the wall to mimic push off in R LE terminal stance                           Therapeutic Activity (41185)                                         Therapeutic Exercise and NMR EXR  [x] (09933) Provided verbal/tactile cueing for activities related to strengthening, flexibility, endurance, ROM for improvements in LE, proximal hip, and core control with self care, mobility, lifting, ambulation.  [] (92299) Provided verbal/tactile cueing for activities related to improving balance, coordination, kinesthetic sense, posture, motor skill, proprioception  to assist with LE, proximal hip, and core control in self care, mobility, lifting, ambulation and eccentric single leg control. NMR and Therapeutic Activities:    [] (30092 or 99546) Provided verbal/tactile cueing for activities related to improving balance, coordination, kinesthetic sense, posture, motor skill, proprioception and motor activation to allow for proper function of core, proximal hip and LE with self care and ADLs  [] (64564) Gait Re-education- Provided training and instruction to the patient for proper LE, core and proximal hip recruitment and positioning and eccentric body weight control with ambulation re-education including up and down stairs     Home Exercise Program:    [x] (28593) Reviewed/Progressed HEP activities related to strengthening, flexibility, endurance, ROM of core, proximal hip and LE for functional self-care, mobility, lifting and ambulation/stair navigation   [] (90940)Reviewed/Progressed HEP activities related to improving balance, coordination, kinesthetic sense, posture, motor skill, proprioception of core, proximal hip and LE for self care, mobility, lifting, and ambulation/stair navigation      Access Code: [email or web address]: [employer]/Date: 03/26/2021Prepared by: Brittany Ped NiermanExercises   Supine Knee Flexion Wall Slide - 1 x daily - 7 x weekly - 3 sets - 10 reps - 10 sec hold    Manual Treatments:  PROM / STM / Oscillations-Mobs:  G-I, II, III, IV (PA's, Inf., Post.)  [x] (81696) Provided manual therapy to mobilize LE, proximal hip and/or LS spine soft tissue/joints for the purpose of modulating pain, promoting relaxation,  increasing ROM, reducing/eliminating soft tissue swelling/inflammation/restriction, improving soft tissue extensibility and allowing for proper ROM for normal function with self care, mobility, lifting and ambulation.      Modalities:   [] G CPx10'    Charges:  Timed Code Treatment Minutes: 60   Total Treatment Minutes: 70         [] EVAL (LOW) 80891   [] EVAL (MOD) 55615  [] EVAL (HIGH) 32901   [] RE-EVAL     [x] OQ(32677) x 2   [] IONTO  [] NMR (66662) x [] VASO  [x] Manual (74603) x 2  [] Other:  [] TA x      [] Mech Traction (70477)  [] ES(attended) (75781)      [] ES (un) (79510): NMES           GOALS:  Patient stated goal: be able to squat; bend knee and no pain   [x] Progressing: [] Met: [] Not Met: [] Adjusted    Therapist goals for Patient:   Short Term Goals: To be achieved in: 2 weeks  1. Independent in HEP and progression per patient tolerance, in order to prevent re-injury. [] Progressing: [x] Met: [] Not Met: [] Adjusted  2. Patient will have a decrease in pain to facilitate improvement in movement, function, and ADLs as indicated by Functional Deficits. [] Progressing: [x] Met: [] Not Met: [] Adjusted    Long Term Goals: To be achieved in: 8-10 weeks  1. Disability index score of 40% or less for the LEFS to assist with reaching prior level of function. [x] Progressing: [x] Met: [] Not Met: [] Adjusted  2. Patient will demonstrate increased AROM to R knee ext to -2 and flex to 110  to allow for proper joint functioning as indicated by patients Functional Deficits. [x] Progressing: [x] Met:50%  [] Not Met: [] Adjusted  3. Patient will demonstrate an increase in Strength to good proximal hip strength and control, within 5lb HHD in LE to allow for proper functional mobility as indicated by patients Functional Deficits. [x] Progressing: [] Met: [] Not Met: [] Adjusted  4. Patient will return to being able to ambulate with proper gait without an AD; ascend and descend stairs to basement with reciprocal gait  without increased symptoms or restriction. [x] Progressing: [] Met: [] Not Met: [] Adjusted      Overall Progression Towards Functional goals/ Treatment Progress Update:  [] Patient is progressing as expected towards functional goals listed. [x] Progression is slowed due to complexities/Impairments listed. [] Progression has been slowed due to co-morbidities.   [] Plan just implemented, too soon to assess goals progression <30days   [] Goals require adjustment due to lack of progress  [] Patient is not progressing as expected and requires additional follow up with physician  [] Other :     Prognosis for POC: [x] Good [x] Fair  [] Poor       Patient requires continued skilled intervention: [x] Yes  [] No    Treatment/Activity Tolerance:  [x] Patient able to complete treatment  [] Patient limited by fatigue  [] Patient limited by pain    [] Patient limited by other medical complications   [] Other:     ASSESSMENT:  Pt was able to achieve increased ROM to flexion 110 deg and -4 deg extension with therapist overpressure today. She liked the new positioning for working on ROM with the wall so we added it to her HEP. She still has difficulty achieving these ranges actively with WB function though (squat, step-up) but is making slow progress here too. Discussed D/C cane use at home unless for some reason she feels she needs it from time to time since her strength has gotten better and ROM is improving for LE symmetrical use, but to still use cane for prolonged community ambulation or uneven terrain. PLAN: Cont R knee ROM and strength and normalization of gait mechanics. Cont Pt 1x weekly for 2 weeks after next week then consider HEP for at least 4 weeks with re referral prn. Cont gait mechanics, foot knee and hip. MEDICARE CAP EXCEPTION DOCUMENTATION      I certify that this patient meets one of the below criteria necessary for becoming an exception to the Medicare cap on therapy services:    [x]  The patient has a condition identified by an ICD-10 code that has a direct and significant impact on the need for therapy. (Significantly impacts the rate of recovery.)                   [x]  The patient has a complexity identified by an ICD-10 code that has a direct and significant impact on the need for therapy.   (Significantly impacts the rate of recovery and is associated with a primary condition.)         []  The patient has associated variables that influence the amount of treatment to include:  Social support, self-efficacy/motivation, prognosis, time since onset/acuity. []  The patient has generalized musculoskeletal conditions or a condition affecting multiple sites that will have a direct impact on the rate of recovery. []  The patient had a prior episode of outpatient therapy during this calendar year for a different condition. []  The patient has a mental or cognitive disorder in addition to the condition being treated that will have a direct and significant impact on the rate of recovery. .   [x] Continue per plan of care [] Alter current plan (see comments above)  [] Plan of care initiated [] Hold pending MD visit [] Discharge    Electronically signed by:  Addie Whittington PT      Note: If patient does not return for scheduled/ recommended follow up visits, this note will serve as a discharge from care along with most recent update on progress.

## 2021-03-29 ENCOUNTER — HOSPITAL ENCOUNTER (OUTPATIENT)
Dept: PHYSICAL THERAPY | Age: 71
Setting detail: THERAPIES SERIES
Discharge: HOME | End: 2021-03-29
Payer: MEDICARE

## 2021-03-29 PROCEDURE — 97140 MANUAL THERAPY 1/> REGIONS: CPT | Performed by: PHYSICAL THERAPIST

## 2021-03-29 PROCEDURE — 97112 NEUROMUSCULAR REEDUCATION: CPT | Performed by: PHYSICAL THERAPIST

## 2021-03-29 PROCEDURE — 97110 THERAPEUTIC EXERCISES: CPT | Performed by: PHYSICAL THERAPIST

## 2021-03-29 NOTE — FLOWSHEET NOTE
[facility], [address]  Phone: [phone]  Fax [phone]        Physical Therapy Treatment Note/ Progress Report:           Date:  3/29/2021    Patient Name:  Erik Mcnair    :  1950  MRN: [id number]  Restrictions/Precautions:    Medical/Treatment Diagnosis Information:  Diagnosis: s/p r TKR sx date 2020 M25.561; M17.11 MINH 20  Treatment Diagnosis: R knee pain Q59.798  Insurance/Certification information:  PT Insurance Information: /humana  Physician Information:  Referring Practitioner: Dr. Gino Jiang  Has the plan of care been signed (Y/N):        []  Yes  [x]  No     Date of Patient follow up with Physician:  3/15/21    Is this a Progress Report:     [x]  Yes  []  No        If Yes:  Date Range for reporting period:  Beginning 3/10/21  Ending :    Progress report will be due (10 Rx or 30 days whichever is less): 2021 (done 21 visit 20) Due 3/11/21 visit 30 (done 3/10/21 visit 35) Due visit 43       Recertification will be due (POC Duration  / 90 days whichever is less): 2021       Visit # Date Range Insurance Allowable Requires auth   41 2020- BMN    [x]no        []yes:           SUBJECTIVE:  Pt was a little sore from last session working on the ROM a lot but recovered by the next day. She finds herself being less dependent on her cane at home and is considering using her cnae for only when she goes out into community ambulation. Pain:    2/10    OBJECTIVE: See below     Observation:  Patellar mobility and HS tightness normalized to patient's level this visit.    Test measurements: See below     PT Practice Pattern:H  Co morbidities:1-2  SURgical R TKR 20  INITIAL VISIT 12/7/20  3/29/21   PAIN 0-10/10  0/10   FUNCTIONAL SCALE LEFS () 84% (60/80) 25%   PROM KE -7 -5    KF 43 110                           STRENGHT (MMT) Quad tone  +/good -    KE  5/5 KF  5/5    HF  5/5    HAB  5/5               RESTRICTIONS/PRECAUTIONS: OA; osteopenia     Exercises/Interventions:   HEP instruction was provided and handouts given to include:  Access Code: [email or web address]   Patient Portal: [email or web address]/      Therapeutic Ex (56846) Reps/Sets/sec Notes/CUES                       april stretch ADD stretch /captain kika's stretch RLE 3x15\" ea    april stretch KF stretch R/L 5x30\" ea To promote contralateral KE   Recumbent bike 5' for ROM 1/2 rev   Incline stretch 2x30\" Focus form          For quad and glut coactivation       2 finger 2 hand support       VC  +HEP 3/8   X HEP 3/8         Tactile cueing for equal WB, flex through hip and knee with forward torso lean                 Manual Intervention (48919)     STM quad/ITB/HS/patella and patellar mobs GII x6'    Manual KE mobs/stretch x5'                            reassessment         NMR re-education (51427)  CUES NEEDED      Biphasic NMES  Prone HSC with contract and TKE with relax  SAQ with contract, and HSS with relax   14' (7' each)  10\"/10\"  2.0 ramp     1/2 foam roll to promote more use of Quad less HF   VC   MC; performed against the wall to mimic push off in R LE terminal stance   Modified tandem stance R/L with overhead retro ball reach  Modified tandem stance with rows R/L 10x ea    10x ea RTB    Reformer [place] LLE RLE EXT   and reverse 2R  3x10x3\" ea      FSU' with pull into HE/KE 20x  4\" step                   Therapeutic Activity (20419)                                         Therapeutic Exercise and NMR EXR  [x] (65210) Provided verbal/tactile cueing for activities related to strengthening, flexibility, endurance, ROM for improvements in LE, proximal hip, and core control with self care, mobility, lifting, ambulation.   [x] (52742) Provided verbal/tactile cueing for activities related to improving balance, coordination, kinesthetic sense, posture, motor skill, proprioception  to assist with LE, proximal hip, and core control in self care, mobility, lifting, ambulation and eccentric single leg control. NMR and Therapeutic Activities:    [x] (71871 or 33430) Provided verbal/tactile cueing for activities related to improving balance, coordination, kinesthetic sense, posture, motor skill, proprioception and motor activation to allow for proper function of core, proximal hip and LE with self care and ADLs  [x] (14147) Gait Re-education- Provided training and instruction to the patient for proper LE, core and proximal hip recruitment and positioning and eccentric body weight control with ambulation re-education including up and down stairs     Home Exercise Program:    [x] (06852) Reviewed/Progressed HEP activities related to strengthening, flexibility, endurance, ROM of core, proximal hip and LE for functional self-care, mobility, lifting and ambulation/stair navigation   [] (54914)Reviewed/Progressed HEP activities related to improving balance, coordination, kinesthetic sense, posture, motor skill, proprioception of core, proximal hip and LE for self care, mobility, lifting, and ambulation/stair navigation      Manual Treatments:  PROM / STM / Oscillations-Mobs:  G-I, II, III, IV (PA's, Inf., Post.)  [x] (61972) Provided manual therapy to mobilize LE, proximal hip and/or LS spine soft tissue/joints for the purpose of modulating pain, promoting relaxation,  increasing ROM, reducing/eliminating soft tissue swelling/inflammation/restriction, improving soft tissue extensibility and allowing for proper ROM for normal function with self care, mobility, lifting and ambulation.      Modalities:   [] G CPx10'    Charges:  Timed Code Treatment Minutes: 60   Total Treatment Minutes: 60         [] EVAL (LOW) 59620   [] EVAL (MOD) 15564  [] EVAL (HIGH) 16258   [] RE-EVAL     [x] MK(25301) x 1  [] IONTO  [x] NMR (69702) x2  [] VASO  [x] Manual (21591) x 1  [] Other:  [] TA x      [] Mech Traction (05269)  [] ES(attended) (69543) [] ES () (10583): NMES      MEDICARE CAP EXCEPTION DOCUMENTATION      I certify that this patient meets one of the below criteria necessary for becoming an exception to the Medicare cap on therapy services:    [x]  The patient has a condition identified by an ICD-10 code that has a direct and significant impact on the need for therapy. (Significantly impacts the rate of recovery.)                   [x]  The patient has a complexity identified by an ICD-10 code that has a direct and significant impact on the need for therapy. (Significantly impacts the rate of recovery and is associated with a primary condition.)         []  The patient has associated variables that influence the amount of treatment to include:  Social support, self-efficacy/motivation, prognosis, time since onset/acuity. []  The patient has generalized musculoskeletal conditions or a condition affecting multiple sites that will have a direct impact on the rate of recovery. []  The patient had a prior episode of outpatient therapy during this calendar year for a different condition. []  The patient has a mental or cognitive disorder in addition to the condition being treated that will have a direct and significant impact on the rate of recovery. GOALS:  Patient stated goal: be able to squat; bend knee and no pain   [x] Progressing: [] Met: [] Not Met: [] Adjusted    Therapist goals for Patient:   Short Term Goals: To be achieved in: 2 weeks  1. Independent in HEP and progression per patient tolerance, in order to prevent re-injury. [] Progressing: [x] Met: [] Not Met: [] Adjusted  2. Patient will have a decrease in pain to facilitate improvement in movement, function, and ADLs as indicated by Functional Deficits. [] Progressing: [x] Met: [] Not Met: [] Adjusted    Long Term Goals: To be achieved in: 8-10 weeks  1. Disability index score of 40% or less for the LEFS to assist with reaching prior level of function. [x] Progressing: [x] Met: [] Not Met: [] Adjusted  2. Patient will demonstrate increased AROM to R knee ext to -2 and flex to 110  to allow for proper joint functioning as indicated by patients Functional Deficits. [x] Progressing: [x] Met:50%  [] Not Met: [] Adjusted  3. Patient will demonstrate an increase in Strength to good proximal hip strength and control, within 5lb HHD in LE to allow for proper functional mobility as indicated by patients Functional Deficits. [x] Progressing: [] Met: [] Not Met: [] Adjusted  4. Patient will return to being able to ambulate with proper gait without an AD; ascend and descend stairs to basement with reciprocal gait  without increased symptoms or restriction. [x] Progressing: [] Met: [] Not Met: [] Adjusted      Overall Progression Towards Functional goals/ Treatment Progress Update:  [] Patient is progressing as expected towards functional goals listed. [x] Progression is slowed due to complexities/Impairments listed. [] Progression has been slowed due to co-morbidities. [] Plan just implemented, too soon to assess goals progression <30days   [] Goals require adjustment due to lack of progress  [] Patient is not progressing as expected and requires additional follow up with physician  [] Other :     Prognosis for POC: [x] Good [x] Fair  [] Poor       Patient requires continued skilled intervention: [x] Yes  [] No    Treatment/Activity Tolerance:  [x] Patient able to complete treatment  [] Patient limited by fatigue  [] Patient limited by pain    [] Patient limited by other medical complications   [] Other:     ASSESSMENT:   Gait is more symmetrical in stride length, pt cont to have reduced KE/KF moment RLE with ambulation. Pt to still use cane for prolonged community ambulation or uneven terrain. PLAN: Cont R knee ROM and strength and normalization of gait mechanics.   Cont Pt 1x weekly for 2 weeks after this week then consider HEP for at least 4 weeks with re referral prn. Cont gait mechanics, foot knee and hip. .   [x] Continue per plan of care [] Alter current plan (see comments above)  [] Plan of care initiated [] Hold pending MD visit [] Discharge    Electronically signed by:  DI [id number]      Note: If patient does not return for scheduled/ recommended follow up visits, this note will serve as a discharge from care along with most recent update on progress.

## 2021-03-31 ENCOUNTER — HOSPITAL ENCOUNTER (OUTPATIENT)
Dept: PHYSICAL THERAPY | Age: 71
Setting detail: THERAPIES SERIES
Discharge: HOME | End: 2021-03-31
Payer: MEDICARE

## 2021-03-31 PROCEDURE — 97140 MANUAL THERAPY 1/> REGIONS: CPT | Performed by: PHYSICAL THERAPIST

## 2021-03-31 PROCEDURE — 97112 NEUROMUSCULAR REEDUCATION: CPT | Performed by: PHYSICAL THERAPIST

## 2021-03-31 PROCEDURE — 97110 THERAPEUTIC EXERCISES: CPT | Performed by: PHYSICAL THERAPIST

## 2021-03-31 PROCEDURE — 97016 VASOPNEUMATIC DEVICE THERAPY: CPT | Performed by: PHYSICAL THERAPIST

## 2021-03-31 NOTE — FLOWSHEET NOTE
[facility], [address]  Phone: [phone]  Fax [phone]        Physical Therapy Treatment Note/ Progress Report:           Date:  3/31/2021    Patient Name:  Cameron Saunders    :  1950  MRN: [id number]  Restrictions/Precautions:    Medical/Treatment Diagnosis Information:  Diagnosis: s/p r TKR sx date 2020 M25.561; M17.11 MINH 20  Treatment Diagnosis: R knee pain F36.779  Insurance/Certification information:  PT Insurance Information: /humana  Physician Information:  Referring Practitioner: Dr. Parul Brody  Has the plan of care been signed (Y/N):        []  Yes  [x]  No     Date of Patient follow up with Physician:  3/15/21    Is this a Progress Report:     [x]  Yes  []  No        If Yes:  Date Range for reporting period:  Beginning 3/10/21  Ending :    Progress report will be due (10 Rx or 30 days whichever is less): 2021 (done 21 visit 20) Due 3/11/21 visit 30 (done 3/10/21 visit 35) Due visit 43       Recertification will be due (POC Duration  / 90 days whichever is less): 2021       Visit # Date Range Insurance Allowable Requires auth   42 2020- BMN    [x]no        []yes:           SUBJECTIVE:  Pt sates she feels good nd is leaving from here for a weekend in [facility]. Pain:    0-2/10    OBJECTIVE: See below     Observation:  Patellar mobility and HS tightness normalized to patient's level this visit. Tight anterior hip mobs, tight prone hip PROM.    Test measurements: See below     PT Practice Pattern:H  Co morbidities:1-2  SURgical R TKR 20  INITIAL VISIT 12/7/20  3/29/21   PAIN 0-10/10  0/10   FUNCTIONAL SCALE LEFS () 84% (60/80) 25%   PROM KE -7 -5    KF 43 110                           STRENGHT (MMT) Quad tone  +/good -    KE  5/5    KF  5/5    HF  5/5    HAB  5/5               RESTRICTIONS/PRECAUTIONS: OA; osteopenia     Exercises/Interventions: HEP instruction was provided and handouts given to include:  Access Code: [email or web address]   Patient Portal: [email or web address]/      Therapeutic Ex (34309) Reps/Sets/sec Notes/CUES                          To promote contralateral KE   Recumbent bike 5' for ROM 1/2 rev   Incline stretch 2x30\" Focus form          For quad and glut coactivation       2 finger 2 hand support       VC  +HEP 3/8   X HEP 3/8         Tactile cueing for equal WB, flex through hip and knee with forward torso lean                 Manual Intervention (66684)         Manual KE mobs/stretch prone; PROM KF prone and quad stretch; hip PROM IR/ER x15'    Ant hip mobs GIII and hip STM prone x5'                       reassessment         NMR re-education (82042)  CUES NEEDED      1/2 foam roll to promote more use of Quad less HF   VC   MC; performed against the wall to mimic push off in R LE terminal stance      Reformer [place] LLE RLE EXT   and reverse  HR 2R  3x10x3\" ea         Dynamic lunge onto LLE with RLE planted on trustretch 12x Focus on HE/KE   dynamic step up and back RLE on trustretch 10x Focus ECC        Therapeutic Activity (38415)                                         Therapeutic Exercise and NMR EXR  [x] (69797) Provided verbal/tactile cueing for activities related to strengthening, flexibility, endurance, ROM for improvements in LE, proximal hip, and core control with self care, mobility, lifting, ambulation. [x] (38473) Provided verbal/tactile cueing for activities related to improving balance, coordination, kinesthetic sense, posture, motor skill, proprioception  to assist with LE, proximal hip, and core control in self care, mobility, lifting, ambulation and eccentric single leg control.      NMR and Therapeutic Activities:    [x] (17530 or 17057) Provided verbal/tactile cueing for activities related to improving balance, coordination, kinesthetic sense, posture, motor skill, proprioception and motor activation to allow for that has a direct and significant impact on the need for therapy. (Significantly impacts the rate of recovery.)                   [x]  The patient has a complexity identified by an ICD-10 code that has a direct and significant impact on the need for therapy. (Significantly impacts the rate of recovery and is associated with a primary condition.)         []  The patient has associated variables that influence the amount of treatment to include:  Social support, self-efficacy/motivation, prognosis, time since onset/acuity. []  The patient has generalized musculoskeletal conditions or a condition affecting multiple sites that will have a direct impact on the rate of recovery. []  The patient had a prior episode of outpatient therapy during this calendar year for a different condition. []  The patient has a mental or cognitive disorder in addition to the condition being treated that will have a direct and significant impact on the rate of recovery. GOALS:  Patient stated goal: be able to squat; bend knee and no pain   [x] Progressing: [] Met: [] Not Met: [] Adjusted    Therapist goals for Patient:   Short Term Goals: To be achieved in: 2 weeks  1. Independent in HEP and progression per patient tolerance, in order to prevent re-injury. [] Progressing: [x] Met: [] Not Met: [] Adjusted  2. Patient will have a decrease in pain to facilitate improvement in movement, function, and ADLs as indicated by Functional Deficits. [] Progressing: [x] Met: [] Not Met: [] Adjusted    Long Term Goals: To be achieved in: 8-10 weeks  1. Disability index score of 40% or less for the LEFS to assist with reaching prior level of function. [x] Progressing: [x] Met: [] Not Met: [] Adjusted  2. Patient will demonstrate increased AROM to R knee ext to -2 and flex to 110  to allow for proper joint functioning as indicated by patients Functional Deficits.    [x] Progressing: [x] Met:50%  [] Not Met: [] Adjusted 3. Patient will demonstrate an increase in Strength to good proximal hip strength and control, within 5lb HHD in LE to allow for proper functional mobility as indicated by patients Functional Deficits. [x] Progressing: [] Met: [] Not Met: [] Adjusted  4. Patient will return to being able to ambulate with proper gait without an AD; ascend and descend stairs to basement with reciprocal gait  without increased symptoms or restriction. [x] Progressing: [] Met: [] Not Met: [] Adjusted      Overall Progression Towards Functional goals/ Treatment Progress Update:  [] Patient is progressing as expected towards functional goals listed. [x] Progression is slowed due to complexities/Impairments listed. [] Progression has been slowed due to co-morbidities. [] Plan just implemented, too soon to assess goals progression <30days   [] Goals require adjustment due to lack of progress  [] Patient is not progressing as expected and requires additional follow up with physician  [] Other :     Prognosis for POC: [x] Good [x] Fair  [] Poor       Patient requires continued skilled intervention: [x] Yes  [] No    Treatment/Activity Tolerance:  [x] Patient able to complete treatment  [] Patient limited by fatigue  [] Patient limited by pain    [] Patient limited by other medical complications   [] Other:     ASSESSMENT:   Gait is more symmetrical in stride length, pt cont to have reduced KE/KF moment RLE with ambulation. Pt to still use cane for prolonged community ambulation or uneven terrain. PLAN: Cont R knee ROM and strength and normalization of gait mechanics. Cont Pt 1x weekly for 2 weeks  then consider HEP for at least 4 weeks with re referral prn. Cont gait mechanics, foot knee and hip.     .   [x] Continue per plan of care [] Alter current plan (see comments above)  [] Plan of care initiated [] Hold pending MD visit [] Discharge    Electronically signed by:  Chary Johnson, [id number]      Note: If patient does not return for

## 2021-04-02 ENCOUNTER — APPOINTMENT (OUTPATIENT)
Dept: PHYSICAL THERAPY | Age: 71
End: 2021-04-02
Payer: MEDICARE

## 2021-04-07 ENCOUNTER — HOSPITAL ENCOUNTER (OUTPATIENT)
Dept: PHYSICAL THERAPY | Age: 71
Setting detail: THERAPIES SERIES
Discharge: HOME | End: 2021-04-07
Payer: MEDICARE

## 2021-04-07 PROCEDURE — 97140 MANUAL THERAPY 1/> REGIONS: CPT | Performed by: PHYSICAL THERAPIST

## 2021-04-07 PROCEDURE — 97110 THERAPEUTIC EXERCISES: CPT | Performed by: PHYSICAL THERAPIST

## 2021-04-07 PROCEDURE — 97112 NEUROMUSCULAR REEDUCATION: CPT | Performed by: PHYSICAL THERAPIST

## 2021-04-07 NOTE — FLOWSHEET NOTE
[facility], [address]  Phone: [phone]  Fax [phone]        Physical Therapy Treatment Note/ Progress Report:           Date:  2021    Patient Name:  Torres Juan    :  1950  MRN: [id number]  Restrictions/Precautions:    Medical/Treatment Diagnosis Information:  Diagnosis: s/p r TKR sx date 2020 M25.561; M17.11 MINH 20  Treatment Diagnosis: R knee pain D65.005  Insurance/Certification information:  PT Insurance Information: /humana  Physician Information:  Referring Practitioner: Dr. Norbert Graves  Has the plan of care been signed (Y/N):        []  Yes  [x]  No     Date of Patient follow up with Physician:      Is this a Progress Report:     [x]  Yes  []  No        If Yes:  Date Range for reporting period:  Beginning 3/10/21  Ending :    Progress report will be due (10 Rx or 30 days whichever is less): 2021 (done 21 visit 20) Due 3/11/21 visit 30 (done 3/10/21 visit 35) Due visit 43       Recertification will be due (POC Duration  / 90 days whichever is less): 2021       Visit # Date Range Insurance Allowable Requires auth   43 2020- BMN    [x]no        []yes:           SUBJECTIVE:  Pt states she had a good trip, able to walk a lot and drive the 2 hours straight without issue. Her L knee is more bothersome than her R at the moment. Pain:    0-2/10    OBJECTIVE: See below     Observation:  Patellar mobility and HS tightness normalized to patient's level this visit. Tight anterior hip mobs, tight prone hip PROM.    Test measurements: See below     PT Practice Pattern:H  Co morbidities:1-2  SURgical R TKR 20  INITIAL VISIT 20   PAIN 0-10/10  0/10   FUNCTIONAL SCALE LEFS () 84% (60/80) 25%   PROM KE -7 -3    KF 43 110    prone KF AROM NT 92                     STRENGHT (MMT) Quad tone  +/good -    KE  5/5    KF  5/5    HF  5/    HAB   ADLs  [] (14513) Gait Re-education- Provided training and instruction to the patient for proper LE, core and proximal hip recruitment and positioning and eccentric body weight control with ambulation re-education including up and down stairs     Home Exercise Program:    [x] (09489) Reviewed/Progressed HEP activities related to strengthening, flexibility, endurance, ROM of core, proximal hip and LE for functional self-care, mobility, lifting and ambulation/stair navigation   [] (91001)Reviewed/Progressed HEP activities related to improving balance, coordination, kinesthetic sense, posture, motor skill, proprioception of core, proximal hip and LE for self care, mobility, lifting, and ambulation/stair navigation      Manual Treatments:  PROM / STM / Oscillations-Mobs:  G-I, II, III, IV (PA's, Inf., Post.)  [x] (08963) Provided manual therapy to mobilize LE, proximal hip and/or LS spine soft tissue/joints for the purpose of modulating pain, promoting relaxation,  increasing ROM, reducing/eliminating soft tissue swelling/inflammation/restriction, improving soft tissue extensibility and allowing for proper ROM for normal function with self care, mobility, lifting and ambulation. Modalities:   [] GAME READY (VASO)- for significant edema, swelling, pain control. Charges:  Timed Code Treatment Minutes: 50   Total Treatment Minutes: 50         [] EVAL (LOW) 22006   [] EVAL (MOD) 84636  [] EVAL (HIGH) 34927   [] RE-EVAL     [x] SC(46682) x 1  [] IONTO  [x] NMR (65195) x1  [] VASO  [x] Manual (34987) x 1  [] Other:  [] TA x      [] Mech Traction (31217)  [] ES(attended) (28630)      [] ES (un) (90411): NMES      MEDICARE CAP EXCEPTION DOCUMENTATION      I certify that this patient meets one of the below criteria necessary for becoming an exception to the Medicare cap on therapy services:    [x]  The patient has a condition identified by an ICD-10 code that has a direct and significant impact on the need for therapy. (Significantly impacts the rate of recovery.)                   [x]  The patient has a complexity identified by an ICD-10 code that has a direct and significant impact on the need for therapy. (Significantly impacts the rate of recovery and is associated with a primary condition.)         []  The patient has associated variables that influence the amount of treatment to include:  Social support, self-efficacy/motivation, prognosis, time since onset/acuity. []  The patient has generalized musculoskeletal conditions or a condition affecting multiple sites that will have a direct impact on the rate of recovery. []  The patient had a prior episode of outpatient therapy during this calendar year for a different condition. []  The patient has a mental or cognitive disorder in addition to the condition being treated that will have a direct and significant impact on the rate of recovery. GOALS:  Patient stated goal: be able to squat; bend knee and no pain   [x] Progressing: [] Met: [] Not Met: [] Adjusted    Therapist goals for Patient:   Short Term Goals: To be achieved in: 2 weeks  1. Independent in HEP and progression per patient tolerance, in order to prevent re-injury. [] Progressing: [x] Met: [] Not Met: [] Adjusted  2. Patient will have a decrease in pain to facilitate improvement in movement, function, and ADLs as indicated by Functional Deficits. [] Progressing: [x] Met: [] Not Met: [] Adjusted    Long Term Goals: To be achieved in: 8-10 weeks  1. Disability index score of 40% or less for the LEFS to assist with reaching prior level of function. [x] Progressing: [x] Met: [] Not Met: [] Adjusted  2. Patient will demonstrate increased AROM to R knee ext to -2 and flex to 110  to allow for proper joint functioning as indicated by patients Functional Deficits. [] Progressing: [x] Met:  [] Not Met: [] Adjusted  3.  Patient will demonstrate an increase in Strength to good proximal hip strength and control, within 5lb HHD in LE to allow for proper functional mobility as indicated by patients Functional Deficits. [x] Progressing: [] Met: [] Not Met: [] Adjusted  4. Patient will return to being able to ambulate with proper gait without an AD; ascend and descend stairs to basement with reciprocal gait  without increased symptoms or restriction. [] Progressing: [x] Met: [] Not Met: [] Adjusted      Overall Progression Towards Functional goals/ Treatment Progress Update:  [] Patient is progressing as expected towards functional goals listed. [x] Progression is slowed due to complexities/Impairments listed. [] Progression has been slowed due to co-morbidities. [] Plan just implemented, too soon to assess goals progression <30days   [] Goals require adjustment due to lack of progress  [] Patient is not progressing as expected and requires additional follow up with physician  [] Other :     Prognosis for POC: [x] Good [x] Fair  [] Poor       Patient requires continued skilled intervention: [x] Yes  [] No    Treatment/Activity Tolerance:  [x] Patient able to complete treatment  [] Patient limited by fatigue  [] Patient limited by pain    [] Patient limited by other medical complications   [] Other:     ASSESSMENT:   Gait is more symmetrical in stride length, pt cont to have reduced KE/KF moment RLE with ambulation. Pt to still use cane for prolonged community ambulation or uneven terrain. PLAN: Cont PT 1x next week then  then  HEP for at least 4 weeks with re referral prn. Cont gait mechanics, foot knee and hip. .   [x] Continue per plan of care [] Alter current plan (see comments above)  [] Plan of care initiated [] Hold pending MD visit [] Discharge    Electronically signed by:  Jerardo Stevens, [id number]      Note: If patient does not return for scheduled/ recommended follow up visits, this note will serve as a discharge from care along with most recent update on progress.

## 2021-04-14 ENCOUNTER — HOSPITAL ENCOUNTER (OUTPATIENT)
Dept: PHYSICAL THERAPY | Age: 71
Setting detail: THERAPIES SERIES
Discharge: HOME | End: 2021-04-14
Payer: MEDICARE

## 2021-04-14 PROCEDURE — 97110 THERAPEUTIC EXERCISES: CPT | Performed by: PHYSICAL THERAPIST

## 2021-04-14 PROCEDURE — 97140 MANUAL THERAPY 1/> REGIONS: CPT | Performed by: PHYSICAL THERAPIST

## 2021-04-14 NOTE — DISCHARGE SUMMARY
sec    PT Practice Pattern:H  Co morbidities:1-2  SURgical R TKR 11/20/20 with MINH 1/19/21  INITIAL VISIT 12/7/20 4/14/21   PAIN 0-10/10 5/10 0-2/10   FUNCTIONAL SCALE LEFS (13/80) 84% (63/80)   PROM KE -7 -3    Supine KF 43 Reformer 116    prone KF AROM NT 98                     STRENGHT (MMT) Quad tone  +/good -    KE  5/5    KF  5/5    HF  5/5    HAB  5/5                 MEDICARE CAP EXCEPTION DOCUMENTATION      I certify that this patient meets one of the below criteria necessary for becoming an exception to the Medicare cap on therapy services:    [x]  The patient has a condition identified by an ICD-10 code that has a direct and significant impact on the need for therapy. (Significantly impacts the rate of recovery.)                   [x]  The patient has a complexity identified by an ICD-10 code that has a direct and significant impact on the need for therapy. (Significantly impacts the rate of recovery and is associated with a primary condition.)         []  The patient has associated variables that influence the amount of treatment to include:  Social support, self-efficacy/motivation, prognosis, time since onset/acuity. []  The patient has generalized musculoskeletal conditions or a condition affecting multiple sites that will have a direct impact on the rate of recovery. []  The patient had a prior episode of outpatient therapy during this calendar year for a different condition. []  The patient has a mental or cognitive disorder in addition to the condition being treated that will have a direct and significant impact on the rate of recovery. GOALS:  Patient stated goal: be able to squat; bend knee and no pain   [x] Progressing: [] Met: [] Not Met: [] Adjusted    Therapist goals for Patient:   Short Term Goals: To be achieved in: 2 weeks 2/2 goals met  1. Independent in HEP and progression per patient tolerance, in order to prevent re-injury.    [] Progressing: [x] Met: [] Not Met: [] Adjusted  2. Patient will have a decrease in pain to facilitate improvement in movement, function, and ADLs as indicated by Functional Deficits. [] Progressing: [x] Met: [] Not Met: [] Adjusted    Long Term Goals: To be achieved in: 8-10 weeks 4/4 goals met  1. Disability index score of 40% or less for the LEFS to assist with reaching prior level of function. [x] Progressing: [x] Met: [] Not Met: [] Adjusted  2. Patient will demonstrate increased AROM to R knee ext to -2 and flex to 110  to allow for proper joint functioning as indicated by patients Functional Deficits. [] Progressing: [x] Met:  [] Not Met: [] Adjusted  3. Patient will demonstrate an increase in Strength to good proximal hip strength and control, within 5lb HHD in LE to allow for proper functional mobility as indicated by patients Functional Deficits. [] Progressing: [x] Met: [] Not Met: [] Adjusted  4. Patient will return to being able to ambulate with proper gait without an AD; ascend and descend stairs to basement with reciprocal gait  without increased symptoms or restriction. [] Progressing: [x] Met: [] Not Met: [] Adjusted      Overall Progression Towards Functional goals/ Treatment Progress Update:  [] Patient is progressing as expected towards functional goals listed. [x] Progression is slowed due to complexities/Impairments listed. [] Progression has been slowed due to co-morbidities.   [] Plan just implemented, too soon to assess goals progression <30days   [] Goals require adjustment due to lack of progress  [] Patient is not progressing as expected and requires additional follow up with physician  [x] Other :     Prognosis for POC: [x] Good [] Fair  [] Poor       Patient requires continued skilled intervention: [] Yes  [x] Not at this time    Treatment/Activity Tolerance:  [x] Patient able to complete treatment  [] Patient limited by fatigue  [] Patient limited by pain    [] Patient limited by other medical complications   [x] Other: no pain following treatment. ASSESSMENT:   Pt verbalized understanding that if she does not progress OR she has issues or regression to call and we can get her back in for a Re Eval. I do not anticipate any issues and expect her to cont to progress over time with continued HEP. Pt met all of her set goals and states she hs no questions or concerns at this time. Her functional assessments are within functional limits and should continue to improve over time as well with HEP and occasional reassessment. It is likely she will return for a brief session following her trip to [facility]. At this time a discharge summary is being done for this episode. PLAN:  Discharge to HEP. .   [] Continue per plan of care [] Alter current plan (see comments above)  [] Plan of care initiated [] Hold pending MD visit [x] Discharge    Electronically signed by:  Prabha Wallace, [id number]      Note: If patient does not return for scheduled/ recommended follow up visits, this note will serve as a discharge from care along with most recent update on progress.

## 2021-04-14 NOTE — FLOWSHEET NOTE
[facility], [address] [place]  Phone: [phone]  Fax [phone]        Physical Therapy Treatment Note/ Progress Report:           Date:  2021    Patient Name:  Rosa Moreira    :  1950  MRN: [id number]  Restrictions/Precautions:    Medical/Treatment Diagnosis Information:  Diagnosis: s/p r TKR sx date 2020 M25.561; M17.11 MINH 20  Treatment Diagnosis: R knee pain E49.667  Insurance/Certification information:  PT Insurance Information: /humana  Physician Information:  Referring Practitioner: Dr. Josep Randolph  Has the plan of care been signed (Y/N):        []  Yes  [x]  No     Date of Patient follow up with Physician:      Is this a Progress Report:     [x]  Yes and discharge Summary  []  No        If Yes:  Date Range for reporting period:  Beginning 3/10/21 (PN) and 21 (DC)  Ending :21    Progress report will be due (10 Rx or 30 days whichever is less): 2021 (done 21 visit 20) Due 3/11/21 visit 30 (done 3/10/21 visit 35) Due visit 43 95      Recertification will be due (POC Duration  / 90 days whichever is less): 2021       Visit # Date Range Insurance Allowable Requires auth   44 2020-21 BMN    [x]no        []yes:           SUBJECTIVE:  Pt states she is not having pan but some stiffness as usual.  She feels comfortable with her home program and being discharged from PT. Pt verbalized understanding that if she does not progress OR she has issues or regression to call and we can get her back in for a Re Eval..  Pt does state she has some soreness and swelling if she stands on it/walks for 3 hours+. She is able to take care of this with ice and rest.    Pain:    0-2/10    OBJECTIVE: See below     Observation:   Tight anterior hip mobs, tight prone hip PROM. Good ability to stretch KF prone.    Test measurements: See below    TUG: 10 seconds   Sit to stand:9x   SLS RLE:4 sec    PT Practice Pattern:H  Co morbidities:1-2  SURgical R TKR 11/20/20 with MINH 1/19/21  INITIAL VISIT 12/7/20 4/14/21   PAIN 0-10/10 5/10 0-2/10   FUNCTIONAL SCALE LEFS (13/80) 84% (63/80)   PROM KE -7 -3    Supine KF 43 Reformer 116    prone KF AROM NT 98                     STRENGHT (MMT) Quad tone  +/good -    KE  5/5    KF  5/5    HF  5/5    HAB  5/5               RESTRICTIONS/PRECAUTIONS: OA; osteopenia     Exercises/Interventions:   HEP instruction was provided and handouts given to include:  Access Code: [email or web address]   Patient Portal: [email or web address]      Therapeutic Ex (37453) Reps/Sets/sec Notes/CUES                       april stretch ADD stretch /captain kika's stretch RLE 10x5\" ea    april stretch KF stretch R/L and LLE HS stretch 3x30\" ea To promote contralateral KE and ipsilateral KF   Recumbent bike 5' for ROM 1/2 rev   Incline stretch 3x30\" Focus form   Reformer SL 2R1B 20x    Reformer squats and ER squats 2R1B 30x ea          prone KF stretch with strap 5x30\"                                        Manual Intervention (19051)     Roller stick to quad/HS/ITB, GII KE and KF mobs, patellar mobs and PROM KE x15'                                 Reassessment for discharge 8'        NMR re-education (45834)  CUES NEEDED                                      Therapeutic Activity (44588)                                         Therapeutic Exercise and NMR EXR  [x] (23170) Provided verbal/tactile cueing for activities related to strengthening, flexibility, endurance, ROM for improvements in LE, proximal hip, and core control with self care, mobility, lifting, ambulation. [x] (43209) Provided verbal/tactile cueing for activities related to improving balance, coordination, kinesthetic sense, posture, motor skill, proprioception  to assist with LE, proximal hip, and core control in self care, mobility, lifting, ambulation and eccentric single leg control.      NMR and Therapeutic Met: [] Not Met: [] Adjusted  2. Patient will demonstrate increased AROM to R knee ext to -2 and flex to 110  to allow for proper joint functioning as indicated by patients Functional Deficits. [] Progressing: [x] Met:  [] Not Met: [] Adjusted  3. Patient will demonstrate an increase in Strength to good proximal hip strength and control, within 5lb HHD in LE to allow for proper functional mobility as indicated by patients Functional Deficits. [] Progressing: [x] Met: [] Not Met: [] Adjusted  4. Patient will return to being able to ambulate with proper gait without an AD; ascend and descend stairs to basement with reciprocal gait  without increased symptoms or restriction. [] Progressing: [x] Met: [] Not Met: [] Adjusted      Overall Progression Towards Functional goals/ Treatment Progress Update:  [] Patient is progressing as expected towards functional goals listed. [x] Progression is slowed due to complexities/Impairments listed. [] Progression has been slowed due to co-morbidities. [] Plan just implemented, too soon to assess goals progression <30days   [] Goals require adjustment due to lack of progress  [] Patient is not progressing as expected and requires additional follow up with physician  [x] Other :     Prognosis for POC: [x] Good [] Fair  [] Poor       Patient requires continued skilled intervention: [] Yes  [x] Not at this time    Treatment/Activity Tolerance:  [x] Patient able to complete treatment  [] Patient limited by fatigue  [] Patient limited by pain    [] Patient limited by other medical complications   [x] Other: no pain following treatment. ASSESSMENT:   Pt verbalized understanding that if she does not progress OR she has issues or regression to call and we can get her back in for a Re Eval. I do not anticipate any issues and expect her to cont to progress over time with continued HEP. Pt met all of her set goals and states she hs no questions or concerns at this time.   Her functional assessments are within functional limits and should continue to improve over time as well with HEP and occasional reassessment. It is likely she will return for a brief session following her trip to [facility]. At this time a discharge summary is being done for this episode. PLAN:  Discharge to [facility]. .   [] Continue per plan of care [] Alter current plan (see comments above)  [] Plan of care initiated [] Hold pending MD visit [x] Discharge    Electronically signed by:  Mayra Choudhary, [id number]      Note: If patient does not return for scheduled/ recommended follow up visits, this note will serve as a discharge from care along with most recent update on progress.

## (undated) DEVICE — STERILE POLYISOPRENE POWDER-FREE SURGICAL GLOVES: Brand: PROTEXIS

## (undated) DEVICE — PADDING CAST W4INXL4YD NONSTERILE COT RAYON MICROPLEATED

## (undated) DEVICE — GLOVE SURG SZ 65 L12IN FNGR THK94MIL STD WHT LTX FREE

## (undated) DEVICE — SOLUTION IRRIG 5L LAC R BG

## (undated) DEVICE — GAUZE,SPONGE,4"X4",16PLY,STRL,LF,10/TRAY: Brand: MEDLINE

## (undated) DEVICE — TUBING PMP L8FT LNG W/ CONN FOR AR-6400 REDEUCE

## (undated) DEVICE — 3M™ TEGADERM™ TRANSPARENT FILM DRESSING FRAME STYLE, 1624W, 2-3/8 IN X 2-3/4 IN (6 CM X 7 CM), 100/CT 4CT/CASE: Brand: 3M™ TEGADERM™

## (undated) DEVICE — PENCIL SMK EVAC ALL IN 1 DSGN ENH VISIBILITY IMPROVED AIR

## (undated) DEVICE — 3 BONE CEMENT MIXER: Brand: MIXEVAC

## (undated) DEVICE — ENDO CARRY-ON PROCEDURE KIT INCLUDES SUCTION TUBING, LUBRICANT, GAUZE, BIOHAZARD STICKER, TRANSPORT PAD AND INTERCEPT BEDSIDE KIT.: Brand: ENDO CARRY-ON PROCEDURE KIT

## (undated) DEVICE — COVER LT HNDL PLAS RIG 2 PER PK

## (undated) DEVICE — SET GRAV VENT NVENT CK VLV 3 NDL FREE PRT 10 GTT

## (undated) DEVICE — STERILE LATEX POWDER-FREE SURGICAL GLOVESWITH NITRILE COATING: Brand: PROTEXIS

## (undated) DEVICE — SUTURE MCRYL + SZ 4-0 L18IN ABSRB UD L19MM PS-2 3/8 CIR MCP496G

## (undated) DEVICE — Device

## (undated) DEVICE — 3M™ STERI-STRIP™ REINFORCED ADHESIVE SKIN CLOSURES, R1547, 1/2 IN X 4 IN (12 MM X 100 MM), 6 STRIPS/ENVELOPE: Brand: 3M™ STERI-STRIP™

## (undated) DEVICE — T-DRAPE,EXTREMITY,STERILE: Brand: MEDLINE

## (undated) DEVICE — ZIMMER® STERILE DISPOSABLE TOURNIQUET CUFF WITH PLC, DUAL PORT, SINGLE BLADDER, 30 IN. (76 CM)

## (undated) DEVICE — CONTROL SYRINGE LUER-LOCK TIP: Brand: MONOJECT

## (undated) DEVICE — SMARTGOWN BREATHABLE SURGICAL GOWN: Brand: CONVERTORS

## (undated) DEVICE — PACK PROCEDURE SURG ARTHSCP CUST

## (undated) DEVICE — SOLUTION IRRIG 2000ML 0.9% SOD CHL USP UROMATIC PLAS CONT

## (undated) DEVICE — NEEDLE HYPO 22GA L1.5IN BLK POLYPR HUB S STL REG BVL STR

## (undated) DEVICE — SET ADMIN PRIMING 7ML L30IN 7.35LB 20 GTT 2ND RLER CLMP

## (undated) DEVICE — SMARTGOWN SURGICAL GOWN, LARGE: Brand: CONVERTORS

## (undated) DEVICE — GLOVE,SURG,SENSICARE,ALOE,LF,PF,7: Brand: MEDLINE

## (undated) DEVICE — 4.5 MM INCISOR PLUS STRAIGHT                                    BLADES, POWER/EP-1, VIOLET, PACKAGED                                    6 PER BOX, STERILE

## (undated) DEVICE — SUTURE MCRYL SZ 4-0 L18IN ABSRB UD L19MM PS-2 3/8 CIR PRIM Y496G

## (undated) DEVICE — BLADE SURG SAW SAG S STL AGG 905MM LEN 185MM W 127MM THCK

## (undated) DEVICE — PACK COOL L W14XL6.5IN 3 LAYR CONSTR SFT CLP CLSR 4 TIE

## (undated) DEVICE — GLOVE SURG SZ 7 L12IN FNGR THK94MIL STD WHT ISOLEX LTX FREE

## (undated) DEVICE — CATHETER IV 20GA L1.25IN PNK FEP SFTY STR HUB RADPQ DISP

## (undated) DEVICE — AMBIENT SUPER TURBOVAC 90: Brand: COBLATION

## (undated) DEVICE — SUTURE NONABSORBABLE MONOFILAMENT 4-0 PS-2 18 IN BLK ETHILON 1667G

## (undated) DEVICE — GOWN,SIRUS,NON REINFRCD,LARGE,SET IN SL: Brand: MEDLINE

## (undated) DEVICE — Z CONVERTED USE 2271377 BANDAGE COMPR W6INXL5YD EC E REB

## (undated) DEVICE — HANDPIECE SET WITH HIGH FLOW TIP AND SUCTION TUBE: Brand: INTERPULSE

## (undated) DEVICE — PAD,ABDOMINAL,8"X10",ST,LF: Brand: MEDLINE

## (undated) DEVICE — NEEDLE HYPO 20GA L1.5IN YEL POLYPR HUB S STL REG BVL STR

## (undated) DEVICE — DRESSING,GAUZE,XEROFORM,CURAD,1"X8",ST: Brand: CURAD

## (undated) DEVICE — ENDOSCOPIC ULTRASOUND ASPIRATION NEEDLE: Brand: EXPECT SLIMLINE SL

## (undated) DEVICE — OPTIFOAM GENTLE SA, POSTOP, 4X12: Brand: MEDLINE

## (undated) DEVICE — DRILL BIT 3.2MM (1/8'') X 128.0MM

## (undated) DEVICE — NEEDLE HYPO 18GA L1.5IN THN WALL PIVOTING SHLD BVL ORIENTED

## (undated) DEVICE — SOLUTION IV 1000ML LAC RINGERS PH 6.5 INJ USP VIAFLX PLAS

## (undated) DEVICE — SYSTEM SKIN CLSR 22CM DERMBND PRINEO

## (undated) DEVICE — CONMED SCOPE SAVER BITE BLOCK, 20X27 MM: Brand: SCOPE SAVER

## (undated) DEVICE — TUBE IRRIG L8IN LNG PT W/ CONN FOR PMP SYS REDEUCE

## (undated) DEVICE — HOOD, PEEL-AWAY: Brand: FLYTE

## (undated) DEVICE — Z CONVERTED USE 2273232 BANDAGE COMPR W6INXL11YD E KNIT DBL SELF CLSR EZE-BAND

## (undated) DEVICE — TETRA-NET ELASTIC DRESSING RETAINER SIZE 8: Brand: TETRA-NET

## (undated) DEVICE — SPLINT KNEE UNIV FOR LESS THAN 36IN L20IN FOAM LAM E CNTCT

## (undated) DEVICE — SUTURE ETHLN SZ 4-0 L18IN NONABSORBABLE BLK L19MM PS-2 3/8 1667H

## (undated) DEVICE — COVER,TABLE,HEAVY DUTY,50"X90",STRL: Brand: MEDLINE

## (undated) DEVICE — SUTURE SURGLON SZ 1 L18IN NONABSORBABLE BLK GS 21 L37MM 1 2 8886196272

## (undated) DEVICE — Z CONVERTED USE 2273163 BANDAGE COMPR W3INXL4.5YD LTWT E EC SGL LAYERED CLP CLSR

## (undated) DEVICE — ENDOSCOPIC ULTRASOUND FINE NEEDLE BIOPSY (FNB) DEVICE: Brand: ACQUIRE

## (undated) DEVICE — SOLUTION,SALINE,IRRGATION,500ML,STRL: Brand: MEDLINE

## (undated) DEVICE — GLOVE SURG SZ 65 L12IN FNGR THK79MIL GRN LTX FREE

## (undated) DEVICE — 3M™ COBAN™ SELF-ADHERENT WRAP, 1586S, STERILE, 6 IN X 5 YD (15 CM X 4,5 M), 12 ROLLS/CASE: Brand: 3M™ COBAN™

## (undated) DEVICE — SUTURE VCRL + SZ 2-0 L18IN ABSRB UD CT1 L36MM 1/2 CIR VCP839D